# Patient Record
Sex: FEMALE | Race: WHITE | Employment: FULL TIME | URBAN - NONMETROPOLITAN AREA
[De-identification: names, ages, dates, MRNs, and addresses within clinical notes are randomized per-mention and may not be internally consistent; named-entity substitution may affect disease eponyms.]

---

## 2017-03-01 DIAGNOSIS — N92.6 IRREGULAR PERIODS: Primary | ICD-10-CM

## 2017-03-01 DIAGNOSIS — Z31.9 INFERTILITY MANAGEMENT: ICD-10-CM

## 2017-03-07 ENCOUNTER — TELEPHONE (OUTPATIENT)
Dept: OBGYN | Age: 32
End: 2017-03-07

## 2017-03-08 ENCOUNTER — TELEPHONE (OUTPATIENT)
Dept: OBGYN | Age: 32
End: 2017-03-08

## 2017-03-08 ENCOUNTER — HOSPITAL ENCOUNTER (OUTPATIENT)
Dept: GENERAL RADIOLOGY | Age: 32
Discharge: HOME | End: 2017-03-08
Payer: COMMERCIAL

## 2017-03-08 DIAGNOSIS — N92.6 IRREGULAR PERIODS: ICD-10-CM

## 2017-03-08 PROCEDURE — 74740 X-RAY FEMALE GENITAL TRACT: CPT

## 2017-03-17 ENCOUNTER — OFFICE VISIT (OUTPATIENT)
Dept: OBGYN | Age: 32
End: 2017-03-17
Payer: COMMERCIAL

## 2017-03-17 VITALS
DIASTOLIC BLOOD PRESSURE: 60 MMHG | SYSTOLIC BLOOD PRESSURE: 120 MMHG | HEIGHT: 64 IN | BODY MASS INDEX: 23.05 KG/M2 | WEIGHT: 135 LBS

## 2017-03-17 DIAGNOSIS — N92.6 IRREGULAR PERIODS: Primary | ICD-10-CM

## 2017-03-17 LAB
CONTROL: NORMAL
PREGNANCY TEST URINE, POC: NORMAL

## 2017-03-17 PROCEDURE — 99213 OFFICE O/P EST LOW 20 MIN: CPT | Performed by: OBSTETRICS & GYNECOLOGY

## 2017-03-17 PROCEDURE — 81025 URINE PREGNANCY TEST: CPT | Performed by: OBSTETRICS & GYNECOLOGY

## 2017-03-20 ENCOUNTER — TELEPHONE (OUTPATIENT)
Dept: OBGYN | Age: 32
End: 2017-03-20

## 2017-03-21 ENCOUNTER — TELEPHONE (OUTPATIENT)
Dept: OBGYN | Age: 32
End: 2017-03-21

## 2017-03-21 DIAGNOSIS — R79.89 ELEVATED PROLACTIN LEVEL: Primary | ICD-10-CM

## 2017-03-22 ENCOUNTER — HOSPITAL ENCOUNTER (OUTPATIENT)
Dept: ULTRASOUND IMAGING | Age: 32
Discharge: HOME | End: 2017-03-22
Payer: COMMERCIAL

## 2017-03-22 ENCOUNTER — TELEPHONE (OUTPATIENT)
Dept: OBGYN | Age: 32
End: 2017-03-22

## 2017-03-22 DIAGNOSIS — N92.6 IRREGULAR PERIODS: ICD-10-CM

## 2017-03-22 PROCEDURE — 76830 TRANSVAGINAL US NON-OB: CPT

## 2017-03-22 RX ORDER — METFORMIN HYDROCHLORIDE 500 MG/1
TABLET, EXTENDED RELEASE ORAL
Qty: 180 TABLET | Refills: 11 | OUTPATIENT
Start: 2017-03-22 | End: 2017-08-05 | Stop reason: DRUGHIGH

## 2017-05-23 LAB — PROLACTIN: 7.3

## 2017-07-31 ENCOUNTER — OFFICE VISIT (OUTPATIENT)
Dept: URGENT CARE | Age: 32
End: 2017-07-31
Payer: COMMERCIAL

## 2017-07-31 VITALS
HEART RATE: 118 BPM | WEIGHT: 139 LBS | SYSTOLIC BLOOD PRESSURE: 127 MMHG | DIASTOLIC BLOOD PRESSURE: 74 MMHG | OXYGEN SATURATION: 97 % | RESPIRATION RATE: 18 BRPM | BODY MASS INDEX: 23.73 KG/M2 | HEIGHT: 64 IN | TEMPERATURE: 98 F

## 2017-07-31 DIAGNOSIS — J06.9 VIRAL URI: Primary | ICD-10-CM

## 2017-07-31 DIAGNOSIS — R09.89 CHEST CONGESTION: ICD-10-CM

## 2017-07-31 DIAGNOSIS — R11.15 NON-INTRACTABLE CYCLICAL VOMITING WITH NAUSEA: ICD-10-CM

## 2017-07-31 DIAGNOSIS — R05.9 COUGH: ICD-10-CM

## 2017-07-31 LAB — S PYO AG THROAT QL: NORMAL

## 2017-07-31 PROCEDURE — 99212 OFFICE O/P EST SF 10 MIN: CPT | Performed by: NURSE PRACTITIONER

## 2017-07-31 PROCEDURE — 87880 STREP A ASSAY W/OPTIC: CPT | Performed by: NURSE PRACTITIONER

## 2017-07-31 RX ORDER — GUAIFENESIN AND DEXTROMETHORPHAN HYDROBROMIDE 1200; 60 MG/1; MG/1
1 TABLET, EXTENDED RELEASE ORAL EVERY 12 HOURS
Qty: 28 TABLET | Refills: 0 | Status: SHIPPED | OUTPATIENT
Start: 2017-07-31 | End: 2018-02-23

## 2017-07-31 RX ORDER — CABERGOLINE 0.5 MG/1
0.25 TABLET ORAL
COMMUNITY
End: 2018-05-14

## 2017-07-31 RX ORDER — ONDANSETRON 4 MG/1
4 TABLET, ORALLY DISINTEGRATING ORAL EVERY 8 HOURS PRN
Qty: 15 TABLET | Refills: 0 | Status: SHIPPED | OUTPATIENT
Start: 2017-07-31 | End: 2017-08-05 | Stop reason: ALTCHOICE

## 2017-07-31 ASSESSMENT — ENCOUNTER SYMPTOMS
COUGH: 1
SINUS PRESSURE: 0
VOMITING: 1
NAUSEA: 1
SORE THROAT: 0

## 2017-08-05 ENCOUNTER — OFFICE VISIT (OUTPATIENT)
Dept: URGENT CARE | Age: 32
End: 2017-08-05
Payer: COMMERCIAL

## 2017-08-05 VITALS
HEIGHT: 64 IN | TEMPERATURE: 98.7 F | BODY MASS INDEX: 23.56 KG/M2 | RESPIRATION RATE: 20 BRPM | DIASTOLIC BLOOD PRESSURE: 79 MMHG | SYSTOLIC BLOOD PRESSURE: 114 MMHG | WEIGHT: 138 LBS | OXYGEN SATURATION: 96 % | HEART RATE: 114 BPM

## 2017-08-05 DIAGNOSIS — J01.90 ACUTE SINUSITIS, RECURRENCE NOT SPECIFIED, UNSPECIFIED LOCATION: ICD-10-CM

## 2017-08-05 DIAGNOSIS — R05.9 COUGH: ICD-10-CM

## 2017-08-05 DIAGNOSIS — J40 BRONCHITIS: Primary | ICD-10-CM

## 2017-08-05 PROCEDURE — 99213 OFFICE O/P EST LOW 20 MIN: CPT | Performed by: NURSE PRACTITIONER

## 2017-08-05 RX ORDER — AMOXICILLIN AND CLAVULANATE POTASSIUM 875; 125 MG/1; MG/1
1 TABLET, FILM COATED ORAL 2 TIMES DAILY
Qty: 20 TABLET | Refills: 0 | Status: SHIPPED | OUTPATIENT
Start: 2017-08-05 | End: 2017-08-15

## 2017-08-05 ASSESSMENT — ENCOUNTER SYMPTOMS
EYE DISCHARGE: 0
DIARRHEA: 0
WHEEZING: 0
CONSTIPATION: 0
NAUSEA: 0
PHOTOPHOBIA: 0
ABDOMINAL PAIN: 0
COUGH: 1
VOMITING: 0
EYE PAIN: 0
SORE THROAT: 0
SHORTNESS OF BREATH: 0
BLOOD IN STOOL: 0

## 2017-08-16 LAB — PROLACTIN: 2 NG/ML (ref 2.8–26)

## 2017-10-09 DIAGNOSIS — R10.9 ABDOMINAL PAIN, UNSPECIFIED LOCATION: ICD-10-CM

## 2017-10-09 DIAGNOSIS — R10.13 DYSPEPSIA: ICD-10-CM

## 2017-10-09 DIAGNOSIS — R74.8 ELEVATED LIVER ENZYMES: ICD-10-CM

## 2017-10-09 DIAGNOSIS — Z88.9 HISTORY OF ALLERGY: ICD-10-CM

## 2017-10-09 DIAGNOSIS — R53.83 FATIGUE, UNSPECIFIED TYPE: ICD-10-CM

## 2017-10-09 DIAGNOSIS — K21.9 GASTROESOPHAGEAL REFLUX DISEASE, ESOPHAGITIS PRESENCE NOT SPECIFIED: ICD-10-CM

## 2017-10-09 DIAGNOSIS — R94.8 ABNORMAL BILIARY HIDA SCAN: ICD-10-CM

## 2017-10-10 ENCOUNTER — OFFICE VISIT (OUTPATIENT)
Dept: INTERNAL MEDICINE | Age: 32
End: 2017-10-10
Payer: COMMERCIAL

## 2017-10-10 VITALS
HEART RATE: 94 BPM | SYSTOLIC BLOOD PRESSURE: 122 MMHG | TEMPERATURE: 96.5 F | DIASTOLIC BLOOD PRESSURE: 80 MMHG | WEIGHT: 139 LBS | HEIGHT: 64 IN | BODY MASS INDEX: 23.73 KG/M2 | OXYGEN SATURATION: 93 %

## 2017-10-10 DIAGNOSIS — R22.1 NODULE OF NECK: Primary | ICD-10-CM

## 2017-10-10 PROBLEM — R53.83 FATIGUE: Status: ACTIVE | Noted: 2017-10-10

## 2017-10-10 PROBLEM — K21.9 ESOPHAGEAL REFLUX: Status: ACTIVE | Noted: 2017-10-10

## 2017-10-10 PROBLEM — R74.8 ELEVATED LIVER ENZYMES: Status: ACTIVE | Noted: 2017-10-10

## 2017-10-10 PROBLEM — R10.13 DYSPEPSIA: Status: ACTIVE | Noted: 2017-10-10

## 2017-10-10 PROBLEM — Z88.9 HISTORY OF ALLERGY: Status: ACTIVE | Noted: 2017-10-10

## 2017-10-10 PROCEDURE — 99213 OFFICE O/P EST LOW 20 MIN: CPT | Performed by: PHYSICIAN ASSISTANT

## 2017-10-10 RX ORDER — METFORMIN HYDROCHLORIDE 500 MG/1
500 TABLET, EXTENDED RELEASE ORAL 2 TIMES DAILY
Refills: 11 | COMMUNITY
Start: 2017-09-18 | End: 2018-03-11 | Stop reason: SDUPTHER

## 2017-10-10 ASSESSMENT — ENCOUNTER SYMPTOMS
RHINORRHEA: 0
DIARRHEA: 0
EYE REDNESS: 0
SINUS PRESSURE: 0
SORE THROAT: 0
CHEST TIGHTNESS: 0
CONSTIPATION: 0
SHORTNESS OF BREATH: 0
COLOR CHANGE: 0
VOMITING: 0
EYE PAIN: 0
PHOTOPHOBIA: 0
ABDOMINAL PAIN: 0
WHEEZING: 0
NAUSEA: 0
COUGH: 0

## 2017-10-10 NOTE — PROGRESS NOTES
[facility]  [address] [address]  Dept: [phone]  Dept Fax: [phone]: [phone]      [facility] INTERNAL MEDICINE  OFFICE NOTE      Chief Complaint   Patient presents with    Other     knot in neck and shoulder       Samara Cabrera is a 29y.o. year old female who is seen for evaluation of Nodules in the neck. Patient states about a month ago or so she noticed a nodule on the right side of her neck she didn't think much about it and then recently she noticed another one farther down in the neck. Patient denies any fever, change in weight or diet, night sweats, nodules in the breast or any abnormalities. Patient states that some of her aunts have been diagnosed with breast cancer and  from it. Patient states her grandfather had esophageal cancer. Patient states concerned about these nodules. Patient denies any pain around the nodules. Patient denies any family history of lymphoma. Active Ambulatory Problems     Diagnosis Date Noted    Biliary dyskinesia 10/13/2016    Abdominal pain 10/09/2017    Abnormal biliary HIDA scan 10/09/2017    Dyspepsia 10/10/2017    Elevated liver enzymes 10/10/2017    Esophageal reflux 10/10/2017    Fatigue 10/10/2017    History of allergy 10/10/2017     Resolved Ambulatory Problems     Diagnosis Date Noted    No Resolved Ambulatory Problems     Past Medical History:   Diagnosis Date    Esophageal reflux 10/10/2017    Gallbladder disease     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     Prolactin deficiency ([facility] [address]        Past Medical History:   Diagnosis Date    Esophageal reflux 10/10/2017    Gallbladder disease     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     Patient tested positive about two weeks ago from today, 16    Prolactin deficiency ([facility] [address]        Prior to Visit Medications    Medication Sig Taking?  Authorizing Provider   metFORMIN (GLUCOPHAGE-XR) 500 MG extended release tablet Take 500 mg by mouth 2 times daily Yes Historical Provider, MD   cabergoline (DOSTINEX) 0.5 MG tablet Take 0.25 mg by mouth Twice a Week Yes Historical Provider, MD   Prenatal MV-Min-Fe Fum-FA-DHA (PRENATAL 1 PO) Take by mouth Yes Historical Provider, MD   Dextromethorphan-guaiFENesin  MG TB12 Take 1 tablet by mouth every 12 hours Yes DIANA Gusman   Pseudoephedrine HCl (SUDAFED PO) Take by mouth  Historical Provider, MD       Past Surgical History:   Procedure Laterality Date    CHOLECYSTECTOMY, LAPAROSCOPIC N/A 10/13/2016    CHOLECYSTECTOM[facility]AR[facility]COPIC [facility]      TYMPANOSTOMY TUBE PLACEMENT         Family History   Problem Relation Age of Onset    Hypertension Mother     Diabetes Father     Stroke Maternal Grandmother     Heart Disease Maternal Grandfather        No Known Allergies    Social History     Social History    Marital status:      Spouse name: N/A    Number of children: N/A    Years of education: N/A     Occupational History    Not on file. Social History Main Topics    Smoking status: Never Smoker    Smokeless tobacco: Never Used      Comment: Work at [employer] Alcohol use No    Drug use: No    Sexual activity: Not on file     Other Topics Concern    Not on file     Social History Narrative    No narrative on file       Review of Systems  Review of Systems   Constitutional: Negative for activity change, appetite change, chills, fatigue and fever. HENT: Negative for congestion, ear pain, postnasal drip, rhinorrhea, sinus pressure, sneezing and sore throat. Eyes: Negative for photophobia, pain and redness. Respiratory: Negative for cough, chest tightness, shortness of breath and wheezing. Cardiovascular: Negative for chest pain, palpitations and leg swelling. Gastrointestinal: Negative for abdominal pain, constipation, diarrhea, nausea and vomiting.    Genitourinary: Negative for dysuria, frequency, hematuria and distress. She has no wheezes. She has no rales. She exhibits no tenderness. Abdominal: Soft. Bowel sounds are normal. There is no tenderness. There is no rebound and no guarding. Musculoskeletal: Normal range of motion. She exhibits no tenderness or deformity. Lymphadenopathy:     She has no cervical adenopathy. Neurological: She is alert. Skin: Skin is warm, dry and intact. No lesion and no rash noted. No erythema. Psychiatric: She has a normal mood and affect. Her behavior is normal. Judgment and thought content normal. Her mood appears not anxious. She does not exhibit a depressed mood. Nursing note and vitals reviewed. ASSESSMENT      ICD-10-CM ICD-9-CM    1. Nodule of neck R22.1 784.2 CT Soft Tissue Neck W WO Contrast      HCG Qualitative, Serum      Comprehensive Metabolic Panel      CBC Auto Differential       PLAN  Discussed with patient that we would do a CT with and without contrast of the neck. Patient will also need to have labs done prior to CT scan to verify that she is not pregnant and make sure her kidney functions are okay. We'll call patient with results. Patient follow-up as needed for increased pain, fever, weight changes or as needed if not improving. Orders Placed This Encounter   Procedures    CT Soft Tissue Neck W WO Contrast    HCG Qualitative, Serum    Comprehensive Metabolic Panel    CBC Auto Differential        Return in about 2 weeks (around 10/24/2017) for Nodules within the neck. All questions answered. Patient voices understanding and agrees to plans along with risks and benefits of plan. Patient is instructed to continue prior medications, diet, and exercise plans as instructed. Patient agrees to follow up as instructions, sooner if needed, or to go to ER if condition becomes emergent.                   Additional Instructions: As always, patient is advised to bring in medication bottles in order to correctly reconcile with our current

## 2017-10-12 ENCOUNTER — TELEPHONE (OUTPATIENT)
Dept: INTERNAL MEDICINE | Age: 32
End: 2017-10-12

## 2017-10-12 DIAGNOSIS — R22.1 NODULE OF NECK: ICD-10-CM

## 2017-10-12 LAB
ALBUMIN SERPL-MCNC: 3.9 G/DL (ref 3.5–5.2)
ALP BLD-CCNC: 58 U/L (ref 35–104)
ALT SERPL-CCNC: 12 U/L (ref 5–33)
ANION GAP SERPL CALCULATED.3IONS-SCNC: 12 MMOL/L (ref 7–19)
AST SERPL-CCNC: 18 U/L (ref 5–32)
BASOPHILS ABSOLUTE: 0.1 K/UL (ref 0–0.2)
BASOPHILS RELATIVE PERCENT: 1 % (ref 0–1)
BILIRUB SERPL-MCNC: 0.7 MG/DL (ref 0.2–1.2)
BUN BLDV-MCNC: 14 MG/DL (ref 6–20)
CALCIUM SERPL-MCNC: 9 MG/DL (ref 8.6–10)
CHLORIDE BLD-SCNC: 100 MMOL/L (ref 98–111)
CO2: 27 MMOL/L (ref 22–29)
CREAT SERPL-MCNC: 0.6 MG/DL (ref 0.5–0.9)
EOSINOPHILS ABSOLUTE: 0.3 K/UL (ref 0–0.6)
EOSINOPHILS RELATIVE PERCENT: 5.1 % (ref 0–5)
GFR NON-AFRICAN AMERICAN: >60
GLUCOSE BLD-MCNC: 97 MG/DL (ref 74–109)
HCG QUALITATIVE: NEGATIVE
HCT VFR BLD CALC: 41.5 % (ref 37–47)
HEMOGLOBIN: 13.7 G/DL (ref 12–16)
LYMPHOCYTES ABSOLUTE: 1.8 K/UL (ref 1.1–4.5)
LYMPHOCYTES RELATIVE PERCENT: 29.8 % (ref 20–40)
MCH RBC QN AUTO: 27.9 PG (ref 27–31)
MCHC RBC AUTO-ENTMCNC: 33 G/DL (ref 33–37)
MCV RBC AUTO: 84.5 FL (ref 81–99)
MONOCYTES ABSOLUTE: 0.4 K/UL (ref 0–0.9)
MONOCYTES RELATIVE PERCENT: 6.5 % (ref 0–10)
NEUTROPHILS ABSOLUTE: 3.5 K/UL (ref 1.5–7.5)
NEUTROPHILS RELATIVE PERCENT: 57.6 % (ref 50–65)
PDW BLD-RTO: 13.2 % (ref 11.5–14.5)
PLATELET # BLD: 282 K/UL (ref 130–400)
PMV BLD AUTO: 8.9 FL (ref 9.4–12.3)
POTASSIUM SERPL-SCNC: 4 MMOL/L (ref 3.5–5)
RBC # BLD: 4.91 M/UL (ref 4.2–5.4)
SODIUM BLD-SCNC: 139 MMOL/L (ref 136–145)
TOTAL PROTEIN: 7.1 G/DL (ref 6.6–8.7)
WBC # BLD: 6 K/UL (ref 4.8–10.8)

## 2017-10-13 ENCOUNTER — TELEPHONE (OUTPATIENT)
Dept: INTERNAL MEDICINE | Age: 32
End: 2017-10-13

## 2017-10-17 DIAGNOSIS — R22.1 MASS IN NECK: Primary | ICD-10-CM

## 2017-10-18 ENCOUNTER — HOSPITAL ENCOUNTER (OUTPATIENT)
Dept: ULTRASOUND IMAGING | Age: 32
Discharge: HOME | End: 2017-10-18
Payer: COMMERCIAL

## 2017-10-18 DIAGNOSIS — R22.1 MASS IN NECK: ICD-10-CM

## 2017-10-18 PROCEDURE — 76536 US EXAM OF HEAD AND NECK: CPT

## 2017-10-18 NOTE — TELEPHONE ENCOUNTER
.The patient and we will try to do an ultrasound first to see if we can differentiate what these nodules are. Patient agreed. If ultrasound does not give a definitive diagnosis can consider CT at that time. Or send patient over to surgery.

## 2017-11-20 ENCOUNTER — TELEPHONE (OUTPATIENT)
Dept: OBGYN | Age: 32
End: 2017-11-20

## 2017-11-20 DIAGNOSIS — E22.1 HYPERPROLACTINEMIA (HCC): Primary | ICD-10-CM

## 2017-11-21 DIAGNOSIS — Z00.00 ROUTINE GENERAL MEDICAL EXAMINATION AT A HEALTH CARE FACILITY: ICD-10-CM

## 2017-11-21 LAB
ALBUMIN SERPL-MCNC: 4.3 G/DL (ref 3.5–5.2)
ALP BLD-CCNC: 74 U/L (ref 35–104)
ALT SERPL-CCNC: 15 U/L (ref 5–33)
ANION GAP SERPL CALCULATED.3IONS-SCNC: 15 MMOL/L (ref 7–19)
AST SERPL-CCNC: 16 U/L (ref 5–32)
BASOPHILS ABSOLUTE: 0.1 K/UL (ref 0–0.2)
BASOPHILS RELATIVE PERCENT: 0.4 % (ref 0–1)
BILIRUB SERPL-MCNC: 0.4 MG/DL (ref 0.2–1.2)
BILIRUBIN URINE: ABNORMAL
BLOOD, URINE: NEGATIVE
BUN BLDV-MCNC: 11 MG/DL (ref 6–20)
CALCIUM SERPL-MCNC: 9.4 MG/DL (ref 8.6–10)
CHLORIDE BLD-SCNC: 99 MMOL/L (ref 98–111)
CHOLESTEROL, TOTAL: 154 MG/DL (ref 160–199)
CLARITY: CLEAR
CO2: 28 MMOL/L (ref 22–29)
COLOR: YELLOW
CREAT SERPL-MCNC: 0.6 MG/DL (ref 0.5–0.9)
EOSINOPHILS ABSOLUTE: 0.3 K/UL (ref 0–0.6)
EOSINOPHILS RELATIVE PERCENT: 2.8 % (ref 0–5)
GFR NON-AFRICAN AMERICAN: >60
GLUCOSE BLD-MCNC: 91 MG/DL (ref 74–109)
GLUCOSE URINE: NEGATIVE MG/DL
HCT VFR BLD CALC: 43.3 % (ref 37–47)
HDLC SERPL-MCNC: 42 MG/DL (ref 65–121)
HEMOGLOBIN: 14 G/DL (ref 12–16)
KETONES, URINE: NEGATIVE MG/DL
LDL CHOLESTEROL CALCULATED: 99 MG/DL
LEUKOCYTE ESTERASE, URINE: ABNORMAL
LYMPHOCYTES ABSOLUTE: 2.1 K/UL (ref 1.1–4.5)
LYMPHOCYTES RELATIVE PERCENT: 17.7 % (ref 20–40)
MCH RBC QN AUTO: 26.9 PG (ref 27–31)
MCHC RBC AUTO-ENTMCNC: 32.3 G/DL (ref 33–37)
MCV RBC AUTO: 83.3 FL (ref 81–99)
MONOCYTES ABSOLUTE: 0.6 K/UL (ref 0–0.9)
MONOCYTES RELATIVE PERCENT: 5.4 % (ref 0–10)
NEUTROPHILS ABSOLUTE: 8.5 K/UL (ref 1.5–7.5)
NEUTROPHILS RELATIVE PERCENT: 73.4 % (ref 50–65)
NITRITE, URINE: NEGATIVE
PDW BLD-RTO: 13.2 % (ref 11.5–14.5)
PH UA: 5.5
PLATELET # BLD: 344 K/UL (ref 130–400)
PMV BLD AUTO: 8.8 FL (ref 9.4–12.3)
POTASSIUM SERPL-SCNC: 3.9 MMOL/L (ref 3.5–5)
PROTEIN UA: ABNORMAL MG/DL
RBC # BLD: 5.2 M/UL (ref 4.2–5.4)
SODIUM BLD-SCNC: 142 MMOL/L (ref 136–145)
SPECIFIC GRAVITY UA: >=1.03
TOTAL PROTEIN: 7.7 G/DL (ref 6.6–8.7)
TRIGL SERPL-MCNC: 65 MG/DL (ref 0–149)
TSH SERPL DL<=0.05 MIU/L-ACNC: 1.66 UIU/ML (ref 0.27–4.2)
UROBILINOGEN, URINE: 0.2 E.U./DL
VITAMIN D 25-HYDROXY: 34.6 NG/ML
WBC # BLD: 11.6 K/UL (ref 4.8–10.8)

## 2017-11-21 NOTE — TELEPHONE ENCOUNTER
Informed pt that Dr. Tabatha Gooden wants to see what Prolactin level is before starting medication for periods. Will need to refer pt to endocrinologist. Pt aware.

## 2017-11-23 LAB — URINE CULTURE, ROUTINE: NORMAL

## 2017-11-27 ENCOUNTER — OFFICE VISIT (OUTPATIENT)
Dept: INTERNAL MEDICINE | Age: 32
End: 2017-11-27
Payer: COMMERCIAL

## 2017-11-27 VITALS
DIASTOLIC BLOOD PRESSURE: 70 MMHG | HEIGHT: 64 IN | BODY MASS INDEX: 23.22 KG/M2 | SYSTOLIC BLOOD PRESSURE: 124 MMHG | HEART RATE: 109 BPM | WEIGHT: 136 LBS | OXYGEN SATURATION: 96 %

## 2017-11-27 DIAGNOSIS — Z00.00 ROUTINE CHECK-UP: Primary | ICD-10-CM

## 2017-11-27 DIAGNOSIS — J01.90 ACUTE BACTERIAL SINUSITIS: ICD-10-CM

## 2017-11-27 DIAGNOSIS — E28.2 POLYCYSTIC OVARIAN SYNDROME: ICD-10-CM

## 2017-11-27 DIAGNOSIS — E22.1 HYPERPROLACTINEMIA (HCC): ICD-10-CM

## 2017-11-27 DIAGNOSIS — B96.89 ACUTE BACTERIAL SINUSITIS: ICD-10-CM

## 2017-11-27 PROCEDURE — 99214 OFFICE O/P EST MOD 30 MIN: CPT | Performed by: INTERNAL MEDICINE

## 2017-11-27 RX ORDER — AMOXICILLIN AND CLAVULANATE POTASSIUM 875; 125 MG/1; MG/1
1 TABLET, FILM COATED ORAL 2 TIMES DAILY
Qty: 20 TABLET | Refills: 0 | Status: SHIPPED | OUTPATIENT
Start: 2017-11-27 | End: 2017-12-07

## 2017-11-27 ASSESSMENT — ENCOUNTER SYMPTOMS
COUGH: 0
COLOR CHANGE: 0
SORE THROAT: 0
EYE DISCHARGE: 0
PHOTOPHOBIA: 0
EYE REDNESS: 0
RHINORRHEA: 1
FACIAL SWELLING: 0
DIARRHEA: 0
SINUS PAIN: 1
SINUS PRESSURE: 1
EYE ITCHING: 0
BLOOD IN STOOL: 0
CONSTIPATION: 0
ABDOMINAL DISTENTION: 0
ABDOMINAL PAIN: 0
WHEEZING: 0
EYE PAIN: 0
CHEST TIGHTNESS: 0
VOMITING: 0
NAUSEA: 0
BACK PAIN: 0
SHORTNESS OF BREATH: 0

## 2017-11-27 ASSESSMENT — PATIENT HEALTH QUESTIONNAIRE - PHQ9
1. LITTLE INTEREST OR PLEASURE IN DOING THINGS: 0
SUM OF ALL RESPONSES TO PHQ9 QUESTIONS 1 & 2: 0
SUM OF ALL RESPONSES TO PHQ QUESTIONS 1-9: 0
2. FEELING DOWN, DEPRESSED OR HOPELESS: 0

## 2017-11-27 NOTE — PATIENT INSTRUCTIONS
potassium is a form of clavulanic acid, which is similar to penicillin. Clavulanate potassium fights bacteria that is often resistant to penicillins and other antibiotics. The combination of amoxicillin and clavulanate potassium is used to treat many different infections caused by bacteria, such as sinusitis, pneumonia, ear infections, bronchitis, urinary tract infections, and infections of the skin. Amoxicillin and clavulanate potassium may also be used for purposes not listed in this medication guide. What should I discuss with my healthcare provider before taking amoxicillin and clavulanate potassium? Do not use this medication if you are allergic to amoxicillin or clavulanate potassium, or if you have ever had liver problems caused by this medication. Do not use if you are allergic to any other penicillin antibiotic, such as amoxicillin (Amoxil, Augmentin, Dispermox, Moxatag), ampicillin (Principen, Unasyn), dicloxacillin (Dycill, Dynapen), oxacillin (Bactocill), or penicillin (Bicillin L-A, PC Pen VK, Pfizerpen)), and others. To make sure you can safely take this medicine, tell your doctor if you have any of these other conditions:  · liver disease (or a history of hepatitis or jaundice);  · kidney disease;  · mononucleosis; or  · if you are allergic to a cephalosporin antibiotic, such as cefdinir (Omnicef), cefprozil (Cefzil), cefuroxime (Ceftin), cephalexin (Keflex), and others. FDA pregnancy category B. This medication is not expected to be harmful to an unborn baby. Tell your doctor if you are pregnant or plan to become pregnant during treatment. Amoxicillin and clavulanate potassium can make birth control pills less effective. Ask your doctor about using a non-hormone method of birth control (such as a condom, diaphragm, spermicide) to prevent pregnancy while taking amoxicillin and clavulanate potassium. Amoxicillin and clavulanate potassium can pass into breast milk and may harm a nursing baby. Do not use this medication without telling your doctor if you are breast-feeding a baby. The liquid and chewable tablet forms of this medication may contain phenylalanine. Talk to your doctor before using these forms of amoxicillin and clavulanate potassium if you have phenylketonuria (PKU). How should I take amoxicillin and clavulanate potassium? Take exactly as prescribed by your doctor. Do not take in larger or smaller amounts or for longer than recommended. Follow the directions on your prescription label. If you switch from one tablet form to another (regular, chewable, or extended-release tablet), take only the new tablet form and strength prescribed for you. The strength of clavulanate potassium is not the same among the different tablet forms, even though the amount of amoxicillin may be the same as in the tablet you were using before. This medicine may not be as effective or could be harmful if you do not use the exact tablet form your doctor has prescribed. Take this medicine with a full glass of water. Take the medicine at the start of a meal to reduce stomach upset. Take the medicine at the same time each day. The Augmentin tablet should be swallowed whole. The Augmentin Chewable tablet must be chewed before swallowing. Do not swallow a chewable tablet whole. Do not crush or chew the Augmentin XR (extended-release) tablet. Swallow the pill whole, or break the pill in half and take both halves one at a time. If you have trouble swallowing a whole or half pill, talk with your doctor about using another form of amoxicillin and clavulanate potassium. Shake the liquid form of this medicine well just before you measure a dose. To be sure you get the correct dose, measure the liquid with a marked measuring spoon or medicine cup, not with a regular table spoon. If you do not have a dose-measuring device, ask your pharmacist for one. Take this medication for the full prescribed length of time.  Your symptoms may improve before the infection is completely cleared. Skipping doses may also increase your risk of further infection that is resistant to antibiotics. Amoxicillin and clavulanate potassium will not treat a viral infection such as the common cold or flu. This medication can cause false results with certain lab tests for glucose (sugar) in the urine. Tell any doctor who treats you that you are using amoxicillin and clavulanate potassium. Store the tablets at room temperature away from moisture and heat. Store the liquid  in the refrigerator. Throw away any unused liquid after 10 days. What happens if I miss a dose? Take the missed dose as soon as you remember. Skip the missed dose if it is almost time for your next scheduled dose. Do not take extra medicine to make up the missed dose. What happens if I overdose? Seek emergency medical attention or call the Poison Help line at 1-776.842.8728. Overdose can cause nausea, vomiting, stomach pain, diarrhea, skin rash, drowsiness, and hyperactivity. What should I avoid while taking amoxicillin and clavulanate potassium? Antibiotic medicines can cause diarrhea, which may be a sign of a new infection. If you have diarrhea that is watery or has blood in it, stop taking this medication and call your doctor. Do not use any medicine to stop the diarrhea unless your doctor has told you to. What are the possible side effects of amoxicillin and clavulanate potassium? Get emergency medical help if you have any of these signs of an allergic reaction: hives; difficulty breathing; swelling of your face, lips, tongue, or throat.   Stop using this medicine and call your doctor at once if you have a serious side effect such as:  · diarrhea that is watery or has blood in it;  · pale or yellowed skin, dark colored urine, fever, confusion or weakness;  · easy bruising or bleeding;  · skin rash, bruising, severe tingling, numbness, pain, muscle weakness;  · agitation,

## 2017-11-27 NOTE — PROGRESS NOTES
Visit Information    Have you changed or started any medications since your last visit including any over-the-counter medicines, vitamins, or herbal medicines? no   Are you having any side effects from any of your medications? -  no  Have you stopped taking any of your medications? Is so, why? -  no    Have you seen any other physician or provider since your last visit? No  Have you had any other diagnostic tests since your last visit? No  Have you been seen in the emergency room and/or had an admission to a hospital since we last saw you? No  Have you had your routine dental cleaning in the past 6 months? yes - 08/17    Have you activated your [employer] account? If not, what are your barriers?  No: decline     Patient Care Team:  Mattie Colvin MD as PCP - General (Family Medicine)    Medical History Review  Past Medical, Family, and Social History reviewed and does not contribute to the patient presenting condition    Health Maintenance   Topic Date Due    HIV screen  09/14/2002    DTaP/Tdap/Td vaccine (1 - Tdap) 09/14/2006    Cervical cancer screen  09/14/2008    Flu vaccine (1) 09/01/2017

## 2017-11-27 NOTE — PROGRESS NOTES
Chief Complaint   Patient presents with    Annual Exam     nasal congestion has been on sudafed been two weeks, saw lino a month ago for a lump on right neck       HPI: Binta Elizabeth is a 29 y.o. female is here for Her annual physical exam.    Her functional status is good. She denies a history of falls. She has no concerns regarding safety, hearing, or cognition. She sees Dr. Etta Middleton for her annual gynecological screening. She's been dealing with fertility and heavy menstrual periods. She is to see Dr. Kaitlynn Galvan for history of hyperprolactinemia. She is October Colene. Prolactin level is currently pending at this time. She does have a reactive lymph node on the left side of her neck. She's been reported nasal congestion for last 2 weeks. She also has sinus pressure and pain. She denies a complaints of ear pain or sore throat. Her cholesterol is 151. She also has a history of polycystic ovarian syndrome. She is somewhat depressed over the inability to conceive.     Routine screening is as follows:  Vision exam yearly  Pap smear up-to-date  Flu vaccine yearly    Past Medical History:   Diagnosis Date    Esophageal reflux 10/10/2017    Gallbladder disease     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     Patient tested positive about two weeks ago from today, 09/22/16    Prolactin deficiency [facility])       Past Surgical History:   Procedure Laterality Date    CHOLECYSTECTOMY, LAPAROSCOPIC N/A 10/13/2016    CHOLECYSTECTOMY LAPAROSCOPIC performed by Hugh Bee MD [facility] TYMPANOSTOMY TUBE PLACEMENT        Social History     Social History    Marital status:      Spouse name: N/A    Number of children: N/A    Years of education: N/A     Social History Main Topics    Smoking status: Never Smoker    Smokeless tobacco: Never Used      Comment: Work at [employer] Alcohol use No    Drug use: No    Sexual activity: Not Asked     Other Topics Concern    None     Social History Narrative    None      Family History   Problem Relation Age of Onset    Hypertension Mother     Diabetes Father     Stroke Maternal Grandmother     Heart Disease Maternal Grandfather         Current Outpatient Prescriptions   Medication Sig Dispense Refill    amoxicillin-clavulanate (AUGMENTIN) 875-125 MG per tablet Take 1 tablet by mouth 2 times daily for 10 days 20 tablet 0    metFORMIN (GLUCOPHAGE-XR) 500 MG extended release tablet Take 500 mg by mouth 2 times daily  11    Pseudoephedrine HCl (SUDAFED PO) Take by mouth      cabergoline (DOSTINEX) 0.5 MG tablet Take 0.25 mg by mouth Twice a Week      Prenatal MV-Min-Fe Fum-FA-DHA (PRENATAL 1 PO) Take by mouth      Dextromethorphan-guaiFENesin  MG TB12 Take 1 tablet by mouth every 12 hours 28 tablet 0     No current facility-administered medications for this visit. Patient Active Problem List   Diagnosis    Biliary dyskinesia    Abdominal pain    Abnormal biliary HIDA scan    Dyspepsia    Elevated liver enzymes    Esophageal reflux    Fatigue    History of allergy        Review of Systems   Constitutional: Negative for activity change, appetite change, chills, diaphoresis, fatigue, fever and unexpected weight change. HENT: Positive for congestion, postnasal drip, rhinorrhea, sinus pain and sinus pressure. Negative for ear pain, facial swelling, hearing loss, mouth sores, sore throat and tinnitus. Eyes: Negative for photophobia, pain, discharge, redness, itching and visual disturbance. Respiratory: Negative for cough, chest tightness, shortness of breath and wheezing. Cardiovascular: Negative for chest pain, palpitations and leg swelling. Gastrointestinal: Negative for abdominal distention, abdominal pain, blood in stool, constipation, diarrhea, nausea and vomiting. Endocrine: Negative for cold intolerance, heat intolerance, polydipsia, polyphagia and polyuria.    Genitourinary: Negative for decreased urine volume, difficulty

## 2017-11-29 ENCOUNTER — TELEPHONE (OUTPATIENT)
Dept: OBGYN | Age: 32
End: 2017-11-29

## 2017-11-29 RX ORDER — MEDROXYPROGESTERONE ACETATE 10 MG/1
10 TABLET ORAL DAILY
Qty: 30 TABLET | Refills: 1 | Status: SHIPPED | OUTPATIENT
Start: 2017-11-29 | End: 2018-02-23 | Stop reason: SDUPTHER

## 2018-02-23 ENCOUNTER — OFFICE VISIT (OUTPATIENT)
Dept: OBGYN | Age: 33
End: 2018-02-23
Payer: COMMERCIAL

## 2018-02-23 VITALS
DIASTOLIC BLOOD PRESSURE: 73 MMHG | SYSTOLIC BLOOD PRESSURE: 109 MMHG | WEIGHT: 135 LBS | HEIGHT: 64 IN | BODY MASS INDEX: 23.05 KG/M2 | HEART RATE: 82 BPM | TEMPERATURE: 98 F

## 2018-02-23 DIAGNOSIS — Z31.9 INFERTILITY MANAGEMENT: Primary | ICD-10-CM

## 2018-02-23 PROCEDURE — G8427 DOCREV CUR MEDS BY ELIG CLIN: HCPCS | Performed by: OBSTETRICS & GYNECOLOGY

## 2018-02-23 PROCEDURE — 99213 OFFICE O/P EST LOW 20 MIN: CPT | Performed by: OBSTETRICS & GYNECOLOGY

## 2018-02-23 PROCEDURE — G8484 FLU IMMUNIZE NO ADMIN: HCPCS | Performed by: OBSTETRICS & GYNECOLOGY

## 2018-02-23 PROCEDURE — G8420 CALC BMI NORM PARAMETERS: HCPCS | Performed by: OBSTETRICS & GYNECOLOGY

## 2018-02-23 PROCEDURE — 1036F TOBACCO NON-USER: CPT | Performed by: OBSTETRICS & GYNECOLOGY

## 2018-02-23 RX ORDER — LETROZOLE 2.5 MG/1
2.5 TABLET, FILM COATED ORAL DAILY
Qty: 5 TABLET | Refills: 3 | Status: SHIPPED | OUTPATIENT
Start: 2018-02-23 | End: 2018-05-14

## 2018-02-23 RX ORDER — MEDROXYPROGESTERONE ACETATE 10 MG/1
10 TABLET ORAL DAILY
Qty: 30 TABLET | Refills: 1 | Status: SHIPPED | OUTPATIENT
Start: 2018-02-23 | End: 2018-05-14

## 2018-02-23 ASSESSMENT — ENCOUNTER SYMPTOMS
RESPIRATORY NEGATIVE: 1
GASTROINTESTINAL NEGATIVE: 1
EYES NEGATIVE: 1

## 2018-02-27 ENCOUNTER — TELEPHONE (OUTPATIENT)
Dept: OBGYN | Age: 33
End: 2018-02-27

## 2018-02-27 NOTE — TELEPHONE ENCOUNTER
Pt wants to know if she can take the HCG Liquid or if she has to take the injections to trigger ovulation? [address] [facility] has the liquid and [facility] does not.      She is just wanting clarification

## 2018-03-06 ENCOUNTER — TELEPHONE (OUTPATIENT)
Dept: OBGYN | Age: 33
End: 2018-03-06

## 2018-03-07 ENCOUNTER — TELEPHONE (OUTPATIENT)
Dept: OBGYN | Age: 33
End: 2018-03-07

## 2018-03-07 NOTE — TELEPHONE ENCOUNTER
Called to inform pt that Hanna reviewed TVUS since AW is off. Can trigger with HCG per SD. rx called into [facility] [place]. Called and informed pt . Pt states she called both [facility] and [facility] to check on pricing and [address] was cheaper. [address] and asked about Ovidrel rx price and they stated it was $187, however would have to order and will be tomorrow before pt could pick it up. Informed pt that will order through [facility] since they can have it today. Pt will have sister in law give injection. All questions answered.

## 2018-03-12 RX ORDER — METFORMIN HYDROCHLORIDE 500 MG/1
TABLET, EXTENDED RELEASE ORAL
Qty: 120 TABLET | Refills: 11 | Status: SHIPPED | OUTPATIENT
Start: 2018-03-12 | End: 2019-01-11 | Stop reason: ALTCHOICE

## 2018-03-21 ENCOUNTER — TELEPHONE (OUTPATIENT)
Dept: OBGYN | Age: 33
End: 2018-03-21

## 2018-03-21 DIAGNOSIS — Z31.9 INFERTILITY MANAGEMENT: Primary | ICD-10-CM

## 2018-04-02 ENCOUNTER — TELEPHONE (OUTPATIENT)
Dept: OBGYN | Age: 33
End: 2018-04-02

## 2018-04-19 ENCOUNTER — TELEPHONE (OUTPATIENT)
Dept: OBGYN | Age: 33
End: 2018-04-19

## 2018-04-19 ENCOUNTER — OFFICE VISIT (OUTPATIENT)
Dept: INTERNAL MEDICINE | Age: 33
End: 2018-04-19
Payer: COMMERCIAL

## 2018-04-19 VITALS
TEMPERATURE: 98.4 F | WEIGHT: 137 LBS | SYSTOLIC BLOOD PRESSURE: 118 MMHG | DIASTOLIC BLOOD PRESSURE: 78 MMHG | BODY MASS INDEX: 23.39 KG/M2 | HEIGHT: 64 IN | HEART RATE: 111 BPM | OXYGEN SATURATION: 98 %

## 2018-04-19 DIAGNOSIS — N91.2 AMENORRHEA: ICD-10-CM

## 2018-04-19 DIAGNOSIS — Z32.01 POSITIVE PREGNANCY TEST: ICD-10-CM

## 2018-04-19 DIAGNOSIS — N91.2 AMENORRHEA: Primary | ICD-10-CM

## 2018-04-19 LAB
CONTROL: NORMAL
HCG QUALITATIVE: POSITIVE
PREGNANCY TEST URINE, POC: NORMAL

## 2018-04-19 PROCEDURE — 81025 URINE PREGNANCY TEST: CPT | Performed by: NURSE PRACTITIONER

## 2018-04-19 PROCEDURE — G8427 DOCREV CUR MEDS BY ELIG CLIN: HCPCS | Performed by: NURSE PRACTITIONER

## 2018-04-19 PROCEDURE — 1036F TOBACCO NON-USER: CPT | Performed by: NURSE PRACTITIONER

## 2018-04-19 PROCEDURE — 99213 OFFICE O/P EST LOW 20 MIN: CPT | Performed by: NURSE PRACTITIONER

## 2018-04-19 PROCEDURE — G8420 CALC BMI NORM PARAMETERS: HCPCS | Performed by: NURSE PRACTITIONER

## 2018-04-19 ASSESSMENT — ENCOUNTER SYMPTOMS
COUGH: 0
SHORTNESS OF BREATH: 0
PHOTOPHOBIA: 0
COLOR CHANGE: 0
BACK PAIN: 0
VOMITING: 0
NAUSEA: 0
VOICE CHANGE: 0
RHINORRHEA: 0

## 2018-05-14 ENCOUNTER — INITIAL PRENATAL (OUTPATIENT)
Dept: OBGYN | Age: 33
End: 2018-05-14

## 2018-05-14 VITALS — SYSTOLIC BLOOD PRESSURE: 112 MMHG | DIASTOLIC BLOOD PRESSURE: 67 MMHG | WEIGHT: 135 LBS | BODY MASS INDEX: 23.17 KG/M2

## 2018-05-14 DIAGNOSIS — Z34.01 SUPERVISION OF NORMAL FIRST PREGNANCY IN FIRST TRIMESTER: Primary | ICD-10-CM

## 2018-05-14 DIAGNOSIS — O36.80X0 ENCOUNTER TO DETERMINE FETAL VIABILITY OF PREGNANCY, SINGLE OR UNSPECIFIED FETUS: ICD-10-CM

## 2018-05-14 PROCEDURE — 0500F INITIAL PRENATAL CARE VISIT: CPT | Performed by: NURSE PRACTITIONER

## 2018-05-14 RX ORDER — PHENOL 1.4 %
1 AEROSOL, SPRAY (ML) MUCOUS MEMBRANE DAILY
Status: ON HOLD | COMMUNITY
End: 2021-05-06 | Stop reason: HOSPADM

## 2018-06-12 ENCOUNTER — ROUTINE PRENATAL (OUTPATIENT)
Dept: OBGYN | Age: 33
End: 2018-06-12

## 2018-06-12 VITALS
DIASTOLIC BLOOD PRESSURE: 70 MMHG | HEART RATE: 86 BPM | SYSTOLIC BLOOD PRESSURE: 116 MMHG | BODY MASS INDEX: 23.34 KG/M2 | WEIGHT: 136 LBS

## 2018-06-12 DIAGNOSIS — K59.00 CONSTIPATION DURING PREGNANCY IN FIRST TRIMESTER: ICD-10-CM

## 2018-06-12 DIAGNOSIS — Z3A.11 11 WEEKS GESTATION OF PREGNANCY: Primary | ICD-10-CM

## 2018-06-12 DIAGNOSIS — O99.611 CONSTIPATION DURING PREGNANCY IN FIRST TRIMESTER: ICD-10-CM

## 2018-06-12 PROCEDURE — 0502F SUBSEQUENT PRENATAL CARE: CPT | Performed by: OBSTETRICS & GYNECOLOGY

## 2018-06-14 ENCOUNTER — OFFICE VISIT (OUTPATIENT)
Dept: INTERNAL MEDICINE | Age: 33
End: 2018-06-14
Payer: COMMERCIAL

## 2018-06-14 VITALS
TEMPERATURE: 99.2 F | HEART RATE: 123 BPM | BODY MASS INDEX: 23.73 KG/M2 | DIASTOLIC BLOOD PRESSURE: 80 MMHG | SYSTOLIC BLOOD PRESSURE: 128 MMHG | OXYGEN SATURATION: 98 % | HEIGHT: 64 IN | WEIGHT: 139 LBS

## 2018-06-14 DIAGNOSIS — J30.9 CHRONIC ALLERGIC RHINITIS, UNSPECIFIED SEASONALITY, UNSPECIFIED TRIGGER: ICD-10-CM

## 2018-06-14 DIAGNOSIS — J02.9 PHARYNGITIS, UNSPECIFIED ETIOLOGY: Primary | ICD-10-CM

## 2018-06-14 PROCEDURE — G8420 CALC BMI NORM PARAMETERS: HCPCS | Performed by: NURSE PRACTITIONER

## 2018-06-14 PROCEDURE — 1036F TOBACCO NON-USER: CPT | Performed by: NURSE PRACTITIONER

## 2018-06-14 PROCEDURE — 99213 OFFICE O/P EST LOW 20 MIN: CPT | Performed by: NURSE PRACTITIONER

## 2018-06-14 PROCEDURE — G8427 DOCREV CUR MEDS BY ELIG CLIN: HCPCS | Performed by: NURSE PRACTITIONER

## 2018-06-14 ASSESSMENT — ENCOUNTER SYMPTOMS
NAUSEA: 0
PHOTOPHOBIA: 0
COLOR CHANGE: 0
BACK PAIN: 0
SHORTNESS OF BREATH: 0
RHINORRHEA: 1
COUGH: 0
SORE THROAT: 1
VOMITING: 0
VOICE CHANGE: 0

## 2018-07-10 DIAGNOSIS — Z20.828 PARVOVIRUS EXPOSURE: ICD-10-CM

## 2018-07-10 DIAGNOSIS — Z34.02 ENCOUNTER FOR SUPERVISION OF NORMAL FIRST PREGNANCY IN SECOND TRIMESTER: Primary | ICD-10-CM

## 2018-07-11 DIAGNOSIS — Z34.02 ENCOUNTER FOR SUPERVISION OF NORMAL FIRST PREGNANCY IN SECOND TRIMESTER: ICD-10-CM

## 2018-07-11 DIAGNOSIS — Z20.828 PARVOVIRUS EXPOSURE: ICD-10-CM

## 2018-07-12 LAB
PARVOVIRUS B19 IGG ANTIBODY: 4.91 IV
PARVOVIRUS B19 IGM ANTIBODY: 0.23 IV

## 2018-07-13 ENCOUNTER — TELEPHONE (OUTPATIENT)
Dept: OBGYN | Age: 33
End: 2018-07-13

## 2018-07-17 ENCOUNTER — ROUTINE PRENATAL (OUTPATIENT)
Dept: OBGYN | Age: 33
End: 2018-07-17

## 2018-07-17 VITALS
SYSTOLIC BLOOD PRESSURE: 118 MMHG | DIASTOLIC BLOOD PRESSURE: 70 MMHG | HEART RATE: 90 BPM | WEIGHT: 144 LBS | BODY MASS INDEX: 24.72 KG/M2

## 2018-07-17 DIAGNOSIS — Z34.02 ENCOUNTER FOR SUPERVISION OF NORMAL FIRST PREGNANCY IN SECOND TRIMESTER: Primary | ICD-10-CM

## 2018-07-17 PROCEDURE — 0502F SUBSEQUENT PRENATAL CARE: CPT | Performed by: OBSTETRICS & GYNECOLOGY

## 2018-07-17 NOTE — PROGRESS NOTES
Pt presents today for a routine prenatal visit. Pt denies vaginal bleeding, leaking of fluid or cramping. Pt states + fetal movement. Gender U/S today. Anatomy scan next visit.

## 2018-07-17 NOTE — PATIENT INSTRUCTIONS
Exercise    · If you did not exercise much before pregnancy, start slowly. Walking is best. [employer], and do a little more every day.     · Brisk walking, easy jogging, low-impact aerobics, water aerobics, and yoga are good choices. Some sports, such as scuba diving, horseback riding, downhill skiing, gymnastics, and water skiing, are not a good idea.     · Try to do at least 2½ hours a week of moderate exercise, such as a fast walk. One way to do this is to be active 30 minutes a day, at least 5 days a week. It's fine to be active in blocks of 10 minutes or more throughout your day and week.     · Wear loose clothing. And wear shoes and a bra that provide good support.     · Warm up and cool down to start and finish your exercise.     · If you want to use weights, be sure to use light weights. They reduce stress on your joints.    Stay at the best weight for you    · Experts recommend that you gain about 1 pound a month during the first 3 months of your pregnancy.     · Experts recommend that you gain about 1 pound a week during your last 6 months of pregnancy, for a total weight gain of 25 to 35 pounds.     · If you are underweight, you will need to gain more weight (about 28 to 40 pounds).     · If you are overweight, you may not need to gain as much weight (about 15 to 25 pounds).     · If you are gaining weight too fast, use common sense. Exercise every day, and limit sweets, fast foods, and fats. Choose lean meats, fruits, and vegetables.     · If you are having twins or more, your doctor may refer you to a dietitian. Where can you learn more? Go to https://[email or web address].[email or web address] org and sign in to your Gamzee account. Enter C045 in the PodTech box to learn more about \"Weeks 14 to 18 of Your Pregnancy: Care Instructions. \"     If you do not have an account, please click on the \"Sign Up Now\" link.   Current as of: November 21, 2017  Content Version: 11.6  © 5311-4930 Healthwise, Incorporated. Care instructions adapted under license by [facility] ([facility]). If you have questions about a medical condition or this instruction, always ask your healthcare professional. [address] any warranty or liability for your use of this information.

## 2018-08-14 ENCOUNTER — ROUTINE PRENATAL (OUTPATIENT)
Dept: OBGYN | Age: 33
End: 2018-08-14

## 2018-08-14 VITALS
WEIGHT: 148 LBS | HEART RATE: 87 BPM | BODY MASS INDEX: 25.4 KG/M2 | SYSTOLIC BLOOD PRESSURE: 109 MMHG | DIASTOLIC BLOOD PRESSURE: 73 MMHG

## 2018-08-14 DIAGNOSIS — Z3A.20 20 WEEKS GESTATION OF PREGNANCY: ICD-10-CM

## 2018-08-14 DIAGNOSIS — Z34.02 ENCOUNTER FOR SUPERVISION OF NORMAL FIRST PREGNANCY IN SECOND TRIMESTER: Primary | ICD-10-CM

## 2018-08-14 DIAGNOSIS — O99.019 ANTEPARTUM ANEMIA: ICD-10-CM

## 2018-08-14 LAB
BASOPHILS ABSOLUTE: 0 K/UL (ref 0–0.2)
BASOPHILS RELATIVE PERCENT: 0.4 % (ref 0–1)
EOSINOPHILS ABSOLUTE: 0.3 K/UL (ref 0–0.6)
EOSINOPHILS RELATIVE PERCENT: 2.8 % (ref 0–5)
HCT VFR BLD CALC: 32.8 % (ref 37–47)
HEMOGLOBIN: 10.3 G/DL (ref 12–16)
LYMPHOCYTES ABSOLUTE: 1.8 K/UL (ref 1.1–4.5)
LYMPHOCYTES RELATIVE PERCENT: 19.5 % (ref 20–40)
MCH RBC QN AUTO: 24.1 PG (ref 27–31)
MCHC RBC AUTO-ENTMCNC: 31.4 G/DL (ref 33–37)
MCV RBC AUTO: 76.8 FL (ref 81–99)
MONOCYTES ABSOLUTE: 0.7 K/UL (ref 0–0.9)
MONOCYTES RELATIVE PERCENT: 7.1 % (ref 0–10)
NEUTROPHILS ABSOLUTE: 6.5 K/UL (ref 1.5–7.5)
NEUTROPHILS RELATIVE PERCENT: 69.8 % (ref 50–65)
PDW BLD-RTO: 17.2 % (ref 11.5–14.5)
PLATELET # BLD: 223 K/UL (ref 130–400)
PMV BLD AUTO: 9.2 FL (ref 9.4–12.3)
RBC # BLD: 4.27 M/UL (ref 4.2–5.4)
WBC # BLD: 9.3 K/UL (ref 4.8–10.8)

## 2018-08-14 PROCEDURE — 0502F SUBSEQUENT PRENATAL CARE: CPT | Performed by: NURSE PRACTITIONER

## 2018-08-14 RX ORDER — LANOLIN ALCOHOL/MO/W.PET/CERES
325 CREAM (GRAM) TOPICAL 2 TIMES DAILY
Qty: 90 TABLET | Refills: 3 | Status: SHIPPED | OUTPATIENT
Start: 2018-08-14 | End: 2019-01-11 | Stop reason: ALTCHOICE

## 2018-08-14 NOTE — PATIENT INSTRUCTIONS
to help with muscle ache. Prevent leg cramps  · Be sure to get enough calcium. If you are worried that you are not getting enough, talk to your doctor. · Exercise every day, and stretch your legs before bed. · Take a warm bath before bed, and try leg warmers at night. Where can you learn more? Go to https://chpejose de jesusewtony.healthSapphire [email or web address]. org and sign in to your DigitalTown account. Enter V691 in the [employer] box to learn more about \"Weeks 18 to 22 of Your Pregnancy: Care Instructions. \"     If you do not have an account, please click on the \"Sign Up Now\" link. Current as of: November 21, 2017  Content Version: 11.7  © 5198-0919 [employer] Incorporated. Care instructions adapted under license by [facility] ([facility]). If you have questions about a medical condition or this instruction, always ask your healthcare professional. [address] any warranty or liability for your use of this information.

## 2018-09-11 ENCOUNTER — ROUTINE PRENATAL (OUTPATIENT)
Dept: OBGYN | Age: 33
End: 2018-09-11

## 2018-09-11 VITALS
DIASTOLIC BLOOD PRESSURE: 74 MMHG | BODY MASS INDEX: 25.75 KG/M2 | HEART RATE: 89 BPM | WEIGHT: 150 LBS | SYSTOLIC BLOOD PRESSURE: 110 MMHG

## 2018-09-11 DIAGNOSIS — Z34.80 SUPERVISION OF OTHER NORMAL PREGNANCY, ANTEPARTUM: Primary | ICD-10-CM

## 2018-09-11 PROCEDURE — 0502F SUBSEQUENT PRENATAL CARE: CPT | Performed by: OBSTETRICS & GYNECOLOGY

## 2018-10-05 ENCOUNTER — ROUTINE PRENATAL (OUTPATIENT)
Dept: OBGYN | Age: 33
End: 2018-10-05

## 2018-10-05 VITALS
WEIGHT: 157 LBS | DIASTOLIC BLOOD PRESSURE: 65 MMHG | HEART RATE: 78 BPM | SYSTOLIC BLOOD PRESSURE: 110 MMHG | BODY MASS INDEX: 26.95 KG/M2

## 2018-10-05 DIAGNOSIS — Z3A.28 28 WEEKS GESTATION OF PREGNANCY: ICD-10-CM

## 2018-10-05 DIAGNOSIS — Z34.80 SUPERVISION OF OTHER NORMAL PREGNANCY, ANTEPARTUM: Primary | ICD-10-CM

## 2018-10-05 DIAGNOSIS — Z34.80 SUPERVISION OF OTHER NORMAL PREGNANCY, ANTEPARTUM: ICD-10-CM

## 2018-10-05 LAB
GLUCOSE, 1HR PP: 131 MG/DL (ref 75–140)
HCT VFR BLD CALC: 39.3 % (ref 37–47)
HEMOGLOBIN: 12.8 G/DL (ref 12–16)
MCH RBC QN AUTO: 27.9 PG (ref 27–31)
MCHC RBC AUTO-ENTMCNC: 32.6 G/DL (ref 33–37)
MCV RBC AUTO: 85.8 FL (ref 81–99)
PDW BLD-RTO: 19.8 % (ref 11.5–14.5)
PLATELET # BLD: 173 K/UL (ref 130–400)
PMV BLD AUTO: 9.4 FL (ref 9.4–12.3)
RBC # BLD: 4.58 M/UL (ref 4.2–5.4)
WBC # BLD: 8.4 K/UL (ref 4.8–10.8)

## 2018-10-05 PROCEDURE — 0502F SUBSEQUENT PRENATAL CARE: CPT | Performed by: NURSE PRACTITIONER

## 2018-10-18 ENCOUNTER — ROUTINE PRENATAL (OUTPATIENT)
Dept: OBGYN | Age: 33
End: 2018-10-18

## 2018-10-18 VITALS — WEIGHT: 156 LBS | SYSTOLIC BLOOD PRESSURE: 118 MMHG | DIASTOLIC BLOOD PRESSURE: 78 MMHG | BODY MASS INDEX: 26.78 KG/M2

## 2018-10-18 DIAGNOSIS — Z34.03 ENCOUNTER FOR SUPERVISION OF NORMAL FIRST PREGNANCY IN THIRD TRIMESTER: Primary | ICD-10-CM

## 2018-10-18 DIAGNOSIS — Z3A.30 30 WEEKS GESTATION OF PREGNANCY: ICD-10-CM

## 2018-10-18 PROCEDURE — 0502F SUBSEQUENT PRENATAL CARE: CPT | Performed by: NURSE PRACTITIONER

## 2018-11-01 ENCOUNTER — ROUTINE PRENATAL (OUTPATIENT)
Dept: OBGYN | Age: 33
End: 2018-11-01

## 2018-11-01 VITALS
SYSTOLIC BLOOD PRESSURE: 103 MMHG | HEART RATE: 86 BPM | DIASTOLIC BLOOD PRESSURE: 67 MMHG | WEIGHT: 161 LBS | BODY MASS INDEX: 27.64 KG/M2

## 2018-11-01 DIAGNOSIS — Z23 NEED FOR TDAP VACCINATION: ICD-10-CM

## 2018-11-01 DIAGNOSIS — Z34.03 SUPERVISION OF NORMAL FIRST PREGNANCY IN THIRD TRIMESTER: Primary | ICD-10-CM

## 2018-11-01 PROCEDURE — 0502F SUBSEQUENT PRENATAL CARE: CPT | Performed by: OBSTETRICS & GYNECOLOGY

## 2018-11-16 ENCOUNTER — ROUTINE PRENATAL (OUTPATIENT)
Dept: OBGYN | Age: 33
End: 2018-11-16

## 2018-11-16 VITALS
BODY MASS INDEX: 27.81 KG/M2 | DIASTOLIC BLOOD PRESSURE: 74 MMHG | WEIGHT: 162 LBS | HEART RATE: 84 BPM | SYSTOLIC BLOOD PRESSURE: 126 MMHG

## 2018-11-16 DIAGNOSIS — Z3A.34 34 WEEKS GESTATION OF PREGNANCY: ICD-10-CM

## 2018-11-16 DIAGNOSIS — Z34.03 ENCOUNTER FOR SUPERVISION OF NORMAL FIRST PREGNANCY IN THIRD TRIMESTER: Primary | ICD-10-CM

## 2018-11-16 PROCEDURE — 0502F SUBSEQUENT PRENATAL CARE: CPT | Performed by: NURSE PRACTITIONER

## 2018-11-16 NOTE — PATIENT INSTRUCTIONS
alert. This is a good time to start breastfeeding. Where can you learn more? Go to https://chpepiceweb.healthNeovacs. org and sign in to your Smaato account. Enter S537 in the Screaming Sports box to learn more about \"Weeks 34 to 36 of Your Pregnancy: Care Instructions. \"     If you do not have an account, please click on the \"Sign Up Now\" link. Current as of: November 21, 2017  Content Version: 11.8  © 8844-5392 Healthwise, Incorporated. Care instructions adapted under license by [facility] ([facility]). If you have questions about a medical condition or this instruction, always ask your healthcare professional. [address] any warranty or liability for your use of this information.

## 2018-11-30 ENCOUNTER — ROUTINE PRENATAL (OUTPATIENT)
Dept: OBGYN | Age: 33
End: 2018-11-30

## 2018-11-30 VITALS
DIASTOLIC BLOOD PRESSURE: 75 MMHG | SYSTOLIC BLOOD PRESSURE: 112 MMHG | HEART RATE: 85 BPM | WEIGHT: 165 LBS | BODY MASS INDEX: 28.32 KG/M2

## 2018-11-30 DIAGNOSIS — Z3A.36 36 WEEKS GESTATION OF PREGNANCY: ICD-10-CM

## 2018-11-30 DIAGNOSIS — Z34.03 SUPERVISION OF NORMAL FIRST PREGNANCY IN THIRD TRIMESTER: Primary | ICD-10-CM

## 2018-11-30 PROCEDURE — 0502F SUBSEQUENT PRENATAL CARE: CPT | Performed by: NURSE PRACTITIONER

## 2018-11-30 NOTE — PATIENT INSTRUCTIONS
you.  · Try to stay calm when your baby cries. Your baby may get more upset if he or she senses that you are upset. Know how to care for your   · Your baby's umbilical cord stump will drop off on its own, usually between 1 and 2 weeks. To care for your baby's umbilical cord area:  ? Clean the area at the bottom of the cord 2 or 3 times a day. ? Pay special attention to the area where the cord attaches to the skin. ? Keep the diaper folded below the cord. ? Use a damp washcloth or cotton ball to sponge bathe your baby until the stump has come off. · Your baby's first dark stool is called meconium. After the meconium is passed, your baby will develop his or her own bowel pattern. ? Some babies, especially  babies, have several bowel movements a day. Others have one or two a day, or one every 2 to 3 days. ?  babies often have loose, yellow stools. Formula-fed babies have more formed stools. ? If your baby's stools look like little pellets, he or she is constipated. After 2 days of constipation, call your baby's doctor. · If your baby will be circumcised, you can care for him at home. ? Gently rinse his penis with warm water after every diaper change. Do not try to remove the film that forms on the penis. This film will go away on its own. Pat dry. ? Put petroleum ointment, such as Vaseline, on the area of the diaper that will touch your baby's penis. This will keep the diaper from sticking to your baby. ? Ask the doctor about giving your baby acetaminophen (Tylenol) for pain. Where can you learn more? Go to https://chjennifereb.health10-20 Media. org and sign in to your [employer] account. Enter 68  97 in the [facility] box to learn more about \"Week 37 of Your Pregnancy: Care Instructions. \"     If you do not have an account, please click on the \"Sign Up Now\" link. Current as of: 2017  Content Version: 11.8  © 3910-2806 Healthwise, [facility].  Care

## 2018-11-30 NOTE — PROGRESS NOTES
Pt presents for routine OB visit. Denies any problems with bleeding, leaking or cramping. +fetal movement at home. GBS collected today. Discussed labor precautions with pt understanding.

## 2018-12-04 LAB
GROUP B STREP CULTURE: ABNORMAL
GROUP B STREP CULTURE: ABNORMAL
ORGANISM: ABNORMAL

## 2018-12-07 ENCOUNTER — ROUTINE PRENATAL (OUTPATIENT)
Dept: OBGYN | Age: 33
End: 2018-12-07

## 2018-12-07 VITALS
DIASTOLIC BLOOD PRESSURE: 76 MMHG | HEART RATE: 96 BPM | WEIGHT: 165 LBS | SYSTOLIC BLOOD PRESSURE: 119 MMHG | BODY MASS INDEX: 28.32 KG/M2

## 2018-12-07 DIAGNOSIS — Z22.330 GBS CARRIER: ICD-10-CM

## 2018-12-07 DIAGNOSIS — Z34.03 ENCOUNTER FOR SUPERVISION OF NORMAL FIRST PREGNANCY IN THIRD TRIMESTER: Primary | ICD-10-CM

## 2018-12-07 PROCEDURE — 0502F SUBSEQUENT PRENATAL CARE: CPT | Performed by: NURSE PRACTITIONER

## 2018-12-07 NOTE — PATIENT INSTRUCTIONS
uterus has scars from past surgeries. This could increase the chance of a tear in the uterus. ? There is a problem with the placenta. ? The mother has an infection, such as genital herpes, that could be spread to the baby. ? The mother is having twins or more. ? The baby weighs 9 to 10 pounds or more. · Because of the risks of , planned C-sections generally should be done only for medical reasons. And a planned  should be done at 39 weeks or later unless there is a medical reason to do it sooner. Know what to expect after delivery, and plan for the first few weeks at home  · You, your baby, and your partner or  will get identification bands. Only people with matching bands can  the baby from the nursery. · You will learn how to feed, diaper, and bathe your baby. And you will learn how to care for the umbilical cord stump. If your baby will be circumcised, you will also learn how to care for that. · Ask people to wait to visit you until you are at home. And ask them to wash their hands before they touch your baby. · Make sure you have another adult in your home for at least 2 or 3 days after the birth. · During the first 2 weeks, limit when friends and family can visit. · Do not allow visitors who have colds or infections. Make sure all visitors are up to date with their vaccinations. Never let anyone smoke around your baby. · Try to nap when the baby naps. Be aware of postpartum depression  · \"Baby blues\" are common for the first 1 to 2 weeks after birth. You may cry or feel sad or irritable for no reason. · For some women, these feelings last longer and are more intense. This is called postpartum depression. · If your symptoms last for more than a few weeks or you feel very depressed, ask your doctor for help. · Postpartum depression can be treated. Support groups and counseling can help. Sometimes medicine can also help. Where can you learn more?   Go to

## 2018-12-14 ENCOUNTER — ROUTINE PRENATAL (OUTPATIENT)
Dept: OBGYN | Age: 33
End: 2018-12-14

## 2018-12-14 VITALS
HEART RATE: 86 BPM | BODY MASS INDEX: 28.49 KG/M2 | DIASTOLIC BLOOD PRESSURE: 74 MMHG | SYSTOLIC BLOOD PRESSURE: 115 MMHG | WEIGHT: 166 LBS

## 2018-12-14 DIAGNOSIS — Z34.03 SUPERVISION OF NORMAL FIRST PREGNANCY IN THIRD TRIMESTER: Primary | ICD-10-CM

## 2018-12-14 LAB
ABO/RH: NORMAL
ANTIBODY SCREEN: NORMAL
HEPATITIS B SURFACE ANTIGEN INTERPRETATION: NORMAL
HIV-1 AND HIV-2 ANTIBODIES: NORMAL
RPR: NORMAL
RUBELLA ANTIBODY IGG: NORMAL

## 2018-12-14 PROCEDURE — 0502F SUBSEQUENT PRENATAL CARE: CPT | Performed by: OBSTETRICS & GYNECOLOGY

## 2018-12-21 ENCOUNTER — ROUTINE PRENATAL (OUTPATIENT)
Dept: OBGYN | Age: 33
End: 2018-12-21

## 2018-12-21 VITALS
DIASTOLIC BLOOD PRESSURE: 76 MMHG | WEIGHT: 165 LBS | BODY MASS INDEX: 28.32 KG/M2 | HEART RATE: 92 BPM | SYSTOLIC BLOOD PRESSURE: 113 MMHG

## 2018-12-21 DIAGNOSIS — Z3A.39 39 WEEKS GESTATION OF PREGNANCY: ICD-10-CM

## 2018-12-21 DIAGNOSIS — Z34.03 SUPERVISION OF NORMAL FIRST PREGNANCY IN THIRD TRIMESTER: Primary | ICD-10-CM

## 2018-12-21 PROCEDURE — 0502F SUBSEQUENT PRENATAL CARE: CPT | Performed by: NURSE PRACTITIONER

## 2018-12-21 NOTE — PROGRESS NOTES
Patient presents today for routine prenatal visit. Pt denies any vaginal leaking bleeding or contractions. + Fetal movement. U-12.9  Virginia Cyr is here for a return obstetrical visit. Today she is 39w2d weeks EGA. She is doing well and has no complaints. She does have a rash area to upper abdomen. States has dry skin and it's where her breasts are hot and heavy. She  does not have vaginal bleeding, leaking of fluid, contractions. She does not have blurred vision, SOB, or increased swelling in legs or face. Pt does feel fetal movement regularly. O:   Vitals:    12/21/18 1616   BP: 113/76   Pulse: 92   Weight: 165 lb (74.8 kg)     Pt is A&Ox3, in no acute distress. Normocephalic, atraumatic. PERRL. Resp even and non-labored. Skin pink, warm & dry. Gravid abdomen. VELEZ's well. Gait steady. See OB flowsheet. A: Normal IUP at 39w2d wks      Diagnosis Orders   1. Supervision of normal first pregnancy in third trimester     2. 39 weeks gestation of pregnancy         P:   Is scheduled IOL 12/27. Pt counseled on labor precautions  Continue with routine prenatal care. RTC in 1 wk for prenatal visit    MEDICATIONS:  No orders of the defined types were placed in this encounter. ORDERS:  No orders of the defined types were placed in this encounter.

## 2018-12-27 ENCOUNTER — APPOINTMENT (OUTPATIENT)
Dept: LABOR AND DELIVERY | Age: 33
End: 2018-12-27
Payer: COMMERCIAL

## 2018-12-27 ENCOUNTER — HOSPITAL ENCOUNTER (INPATIENT)
Age: 33
LOS: 3 days | Discharge: HOME | End: 2018-12-30
Attending: OBSTETRICS & GYNECOLOGY | Admitting: OBSTETRICS & GYNECOLOGY
Payer: COMMERCIAL

## 2018-12-27 PROBLEM — O47.9 IRREGULAR CONTRACTIONS: Status: ACTIVE | Noted: 2018-12-27

## 2018-12-27 LAB
ABO/RH: NORMAL
AMPHETAMINE SCREEN, URINE: NEGATIVE
ANTIBODY SCREEN: NORMAL
BARBITURATE SCREEN URINE: NEGATIVE
BASOPHILS ABSOLUTE: 0 K/UL (ref 0–0.2)
BASOPHILS RELATIVE PERCENT: 0.2 % (ref 0–1)
BENZODIAZEPINE SCREEN, URINE: NEGATIVE
CANNABINOID SCREEN URINE: NEGATIVE
COCAINE METABOLITE SCREEN URINE: NEGATIVE
EOSINOPHILS ABSOLUTE: 0.3 K/UL (ref 0–0.6)
EOSINOPHILS RELATIVE PERCENT: 3 % (ref 0–5)
HCT VFR BLD CALC: 42.7 % (ref 37–47)
HEMOGLOBIN: 14.7 G/DL (ref 12–16)
LYMPHOCYTES ABSOLUTE: 1.3 K/UL (ref 1.1–4.5)
LYMPHOCYTES RELATIVE PERCENT: 14.8 % (ref 20–40)
Lab: NORMAL
MCH RBC QN AUTO: 31.2 PG (ref 27–31)
MCHC RBC AUTO-ENTMCNC: 34.4 G/DL (ref 33–37)
MCV RBC AUTO: 90.7 FL (ref 81–99)
MONOCYTES ABSOLUTE: 0.6 K/UL (ref 0–0.9)
MONOCYTES RELATIVE PERCENT: 7.1 % (ref 0–10)
NEUTROPHILS ABSOLUTE: 6.6 K/UL (ref 1.5–7.5)
NEUTROPHILS RELATIVE PERCENT: 74.3 % (ref 50–65)
OPIATE SCREEN URINE: NEGATIVE
PDW BLD-RTO: 13.9 % (ref 11.5–14.5)
PLATELET # BLD: 153 K/UL (ref 130–400)
PMV BLD AUTO: 9.2 FL (ref 9.4–12.3)
RBC # BLD: 4.71 M/UL (ref 4.2–5.4)
WBC # BLD: 8.9 K/UL (ref 4.8–10.8)

## 2018-12-27 PROCEDURE — 86900 BLOOD TYPING SEROLOGIC ABO: CPT

## 2018-12-27 PROCEDURE — 2500000003 HC RX 250 WO HCPCS: Performed by: OBSTETRICS & GYNECOLOGY

## 2018-12-27 PROCEDURE — 6360000002 HC RX W HCPCS: Performed by: OBSTETRICS & GYNECOLOGY

## 2018-12-27 PROCEDURE — 86592 SYPHILIS TEST NON-TREP QUAL: CPT

## 2018-12-27 PROCEDURE — 86850 RBC ANTIBODY SCREEN: CPT

## 2018-12-27 PROCEDURE — 80307 DRUG TEST PRSMV CHEM ANLYZR: CPT

## 2018-12-27 PROCEDURE — 2580000003 HC RX 258: Performed by: OBSTETRICS & GYNECOLOGY

## 2018-12-27 PROCEDURE — 3E033VJ INTRODUCTION OF OTHER HORMONE INTO PERIPHERAL VEIN, PERCUTANEOUS APPROACH: ICD-10-PCS | Performed by: OBSTETRICS & GYNECOLOGY

## 2018-12-27 PROCEDURE — 86901 BLOOD TYPING SEROLOGIC RH(D): CPT

## 2018-12-27 PROCEDURE — 85025 COMPLETE CBC W/AUTO DIFF WBC: CPT

## 2018-12-27 PROCEDURE — 36415 COLL VENOUS BLD VENIPUNCTURE: CPT

## 2018-12-27 PROCEDURE — 1220000000 HC SEMI PRIVATE OB R&B

## 2018-12-27 RX ORDER — LIDOCAINE HYDROCHLORIDE 10 MG/ML
30 INJECTION, SOLUTION EPIDURAL; INFILTRATION; INTRACAUDAL; PERINEURAL PRN
Status: DISCONTINUED | OUTPATIENT
Start: 2018-12-27 | End: 2018-12-30 | Stop reason: HOSPADM

## 2018-12-27 RX ORDER — SODIUM CHLORIDE 0.9 % (FLUSH) 0.9 %
10 SYRINGE (ML) INJECTION EVERY 12 HOURS SCHEDULED
Status: DISCONTINUED | OUTPATIENT
Start: 2018-12-27 | End: 2018-12-30 | Stop reason: HOSPADM

## 2018-12-27 RX ORDER — ONDANSETRON 2 MG/ML
4 INJECTION INTRAMUSCULAR; INTRAVENOUS EVERY 6 HOURS PRN
Status: DISCONTINUED | OUTPATIENT
Start: 2018-12-27 | End: 2018-12-30 | Stop reason: HOSPADM

## 2018-12-27 RX ORDER — TERBUTALINE SULFATE 1 MG/ML
0.25 INJECTION, SOLUTION SUBCUTANEOUS ONCE
Status: DISCONTINUED | OUTPATIENT
Start: 2018-12-27 | End: 2018-12-30 | Stop reason: HOSPADM

## 2018-12-27 RX ORDER — BUTORPHANOL TARTRATE 1 MG/ML
1 INJECTION, SOLUTION INTRAMUSCULAR; INTRAVENOUS
Status: DISCONTINUED | OUTPATIENT
Start: 2018-12-27 | End: 2018-12-30 | Stop reason: HOSPADM

## 2018-12-27 RX ORDER — SODIUM CHLORIDE 0.9 % (FLUSH) 0.9 %
10 SYRINGE (ML) INJECTION PRN
Status: DISCONTINUED | OUTPATIENT
Start: 2018-12-27 | End: 2018-12-30 | Stop reason: HOSPADM

## 2018-12-27 RX ORDER — SODIUM CHLORIDE, SODIUM LACTATE, POTASSIUM CHLORIDE, CALCIUM CHLORIDE 600; 310; 30; 20 MG/100ML; MG/100ML; MG/100ML; MG/100ML
INJECTION, SOLUTION INTRAVENOUS CONTINUOUS
Status: DISCONTINUED | OUTPATIENT
Start: 2018-12-27 | End: 2018-12-30 | Stop reason: HOSPADM

## 2018-12-27 RX ADMIN — DEXTROSE MONOHYDRATE 2.5 MILLION UNITS: 50 INJECTION, SOLUTION INTRAVENOUS at 23:57

## 2018-12-27 RX ADMIN — PENICILLIN G SODIUM: 5000000 INJECTION, POWDER, FOR SOLUTION INTRAMUSCULAR; INTRAVENOUS at 19:45

## 2018-12-27 RX ADMIN — Medication 1 MILLI-UNITS/MIN: at 19:45

## 2018-12-27 RX ADMIN — SODIUM CHLORIDE, SODIUM LACTATE, POTASSIUM CHLORIDE, AND CALCIUM CHLORIDE: 600; 310; 30; 20 INJECTION, SOLUTION INTRAVENOUS at 18:30

## 2018-12-28 ENCOUNTER — ANESTHESIA (OUTPATIENT)
Dept: MOTHER INFANT UNIT | Age: 33
End: 2018-12-28
Payer: COMMERCIAL

## 2018-12-28 ENCOUNTER — ANESTHESIA EVENT (OUTPATIENT)
Dept: MOTHER INFANT UNIT | Age: 33
End: 2018-12-28
Payer: COMMERCIAL

## 2018-12-28 PROCEDURE — 59400 OBSTETRICAL CARE: CPT | Performed by: OBSTETRICS & GYNECOLOGY

## 2018-12-28 PROCEDURE — 3700000025 ANESTHESIA EPIDURAL BLOCK: Performed by: NURSE ANESTHETIST, CERTIFIED REGISTERED

## 2018-12-28 PROCEDURE — 2580000003 HC RX 258: Performed by: OBSTETRICS & GYNECOLOGY

## 2018-12-28 PROCEDURE — 2500000003 HC RX 250 WO HCPCS: Performed by: NURSE ANESTHETIST, CERTIFIED REGISTERED

## 2018-12-28 PROCEDURE — 6360000002 HC RX W HCPCS: Performed by: OBSTETRICS & GYNECOLOGY

## 2018-12-28 PROCEDURE — 10907ZC DRAINAGE OF AMNIOTIC FLUID, THERAPEUTIC FROM PRODUCTS OF CONCEPTION, VIA NATURAL OR ARTIFICIAL OPENING: ICD-10-PCS | Performed by: OBSTETRICS & GYNECOLOGY

## 2018-12-28 PROCEDURE — 1220000000 HC SEMI PRIVATE OB R&B

## 2018-12-28 PROCEDURE — 6360000002 HC RX W HCPCS: Performed by: NURSE ANESTHETIST, CERTIFIED REGISTERED

## 2018-12-28 RX ORDER — DOCUSATE SODIUM 100 MG/1
100 CAPSULE, LIQUID FILLED ORAL 2 TIMES DAILY
Status: DISCONTINUED | OUTPATIENT
Start: 2018-12-28 | End: 2018-12-30 | Stop reason: HOSPADM

## 2018-12-28 RX ORDER — METHYLERGONOVINE MALEATE 0.2 MG/ML
200 INJECTION INTRAVENOUS
Status: ACTIVE | OUTPATIENT
Start: 2018-12-28 | End: 2018-12-28

## 2018-12-28 RX ORDER — LANOLIN 100 %
OINTMENT (GRAM) TOPICAL PRN
Status: DISCONTINUED | OUTPATIENT
Start: 2018-12-28 | End: 2018-12-30 | Stop reason: HOSPADM

## 2018-12-28 RX ORDER — MISOPROSTOL 200 UG/1
800 TABLET ORAL PRN
Status: DISCONTINUED | OUTPATIENT
Start: 2018-12-28 | End: 2018-12-30 | Stop reason: HOSPADM

## 2018-12-28 RX ORDER — IBUPROFEN 400 MG/1
800 TABLET ORAL EVERY 8 HOURS
Status: DISCONTINUED | OUTPATIENT
Start: 2018-12-28 | End: 2018-12-30 | Stop reason: HOSPADM

## 2018-12-28 RX ORDER — LIDOCAINE HYDROCHLORIDE AND EPINEPHRINE 15; 5 MG/ML; UG/ML
INJECTION, SOLUTION EPIDURAL PRN
Status: DISCONTINUED | OUTPATIENT
Start: 2018-12-28 | End: 2018-12-28 | Stop reason: SDUPTHER

## 2018-12-28 RX ORDER — OXYCODONE HYDROCHLORIDE AND ACETAMINOPHEN 5; 325 MG/1; MG/1
2 TABLET ORAL EVERY 4 HOURS PRN
Status: DISCONTINUED | OUTPATIENT
Start: 2018-12-28 | End: 2018-12-30 | Stop reason: HOSPADM

## 2018-12-28 RX ORDER — ROPIVACAINE HYDROCHLORIDE 2 MG/ML
INJECTION, SOLUTION EPIDURAL; INFILTRATION; PERINEURAL CONTINUOUS PRN
Status: DISCONTINUED | OUTPATIENT
Start: 2018-12-28 | End: 2018-12-28 | Stop reason: SDUPTHER

## 2018-12-28 RX ORDER — CARBOPROST TROMETHAMINE 250 UG/ML
250 INJECTION, SOLUTION INTRAMUSCULAR
Status: ACTIVE | OUTPATIENT
Start: 2018-12-28 | End: 2018-12-28

## 2018-12-28 RX ORDER — OXYCODONE HYDROCHLORIDE AND ACETAMINOPHEN 5; 325 MG/1; MG/1
1 TABLET ORAL EVERY 4 HOURS PRN
Status: DISCONTINUED | OUTPATIENT
Start: 2018-12-28 | End: 2018-12-30 | Stop reason: HOSPADM

## 2018-12-28 RX ORDER — ROPIVACAINE HYDROCHLORIDE 2 MG/ML
INJECTION, SOLUTION EPIDURAL; INFILTRATION; PERINEURAL PRN
Status: DISCONTINUED | OUTPATIENT
Start: 2018-12-28 | End: 2018-12-28 | Stop reason: SDUPTHER

## 2018-12-28 RX ADMIN — ROPIVACAINE HYDROCHLORIDE 5 ML: 2 INJECTION, SOLUTION EPIDURAL; INFILTRATION at 11:39

## 2018-12-28 RX ADMIN — DEXTROSE MONOHYDRATE 2.5 MILLION UNITS: 50 INJECTION, SOLUTION INTRAVENOUS at 04:21

## 2018-12-28 RX ADMIN — DEXTROSE MONOHYDRATE 2.5 MILLION UNITS: 50 INJECTION, SOLUTION INTRAVENOUS at 08:47

## 2018-12-28 RX ADMIN — LIDOCAINE HYDROCHLORIDE AND EPINEPHRINE 3 ML: 15; 5 INJECTION, SOLUTION EPIDURAL at 11:32

## 2018-12-28 RX ADMIN — ROPIVACAINE HYDROCHLORIDE 10 ML/HR: 2 INJECTION, SOLUTION EPIDURAL; INFILTRATION at 11:40

## 2018-12-28 RX ADMIN — DEXTROSE MONOHYDRATE 2.5 MILLION UNITS: 50 INJECTION, SOLUTION INTRAVENOUS at 12:51

## 2018-12-28 RX ADMIN — SODIUM CHLORIDE, SODIUM LACTATE, POTASSIUM CHLORIDE, AND CALCIUM CHLORIDE: 600; 310; 30; 20 INJECTION, SOLUTION INTRAVENOUS at 01:25

## 2018-12-28 RX ADMIN — ROPIVACAINE HYDROCHLORIDE 5 ML: 2 INJECTION, SOLUTION EPIDURAL; INFILTRATION at 11:35

## 2018-12-28 RX ADMIN — ROPIVACAINE HYDROCHLORIDE 5 ML: 2 INJECTION, SOLUTION EPIDURAL; INFILTRATION at 14:56

## 2018-12-29 LAB
HCT VFR BLD CALC: 39.1 % (ref 37–47)
HEMOGLOBIN: 13.4 G/DL (ref 12–16)
MCH RBC QN AUTO: 31.4 PG (ref 27–31)
MCHC RBC AUTO-ENTMCNC: 34.3 G/DL (ref 33–37)
MCV RBC AUTO: 91.6 FL (ref 81–99)
PDW BLD-RTO: 14 % (ref 11.5–14.5)
PLATELET # BLD: 144 K/UL (ref 130–400)
PMV BLD AUTO: 9.6 FL (ref 9.4–12.3)
RBC # BLD: 4.27 M/UL (ref 4.2–5.4)
WBC # BLD: 14.5 K/UL (ref 4.8–10.8)

## 2018-12-29 PROCEDURE — 85027 COMPLETE CBC AUTOMATED: CPT

## 2018-12-29 PROCEDURE — 7200000001 HC VAGINAL DELIVERY

## 2018-12-29 PROCEDURE — 36415 COLL VENOUS BLD VENIPUNCTURE: CPT

## 2018-12-29 PROCEDURE — 6370000000 HC RX 637 (ALT 250 FOR IP): Performed by: OBSTETRICS & GYNECOLOGY

## 2018-12-29 PROCEDURE — 1220000000 HC SEMI PRIVATE OB R&B

## 2018-12-29 RX ADMIN — DOCUSATE SODIUM 100 MG: 100 CAPSULE, LIQUID FILLED ORAL at 08:51

## 2018-12-30 VITALS
OXYGEN SATURATION: 100 % | HEART RATE: 70 BPM | RESPIRATION RATE: 16 BRPM | SYSTOLIC BLOOD PRESSURE: 117 MMHG | TEMPERATURE: 97.4 F | DIASTOLIC BLOOD PRESSURE: 72 MMHG

## 2018-12-30 PROCEDURE — 6370000000 HC RX 637 (ALT 250 FOR IP): Performed by: OBSTETRICS & GYNECOLOGY

## 2018-12-30 RX ADMIN — DOCUSATE SODIUM 100 MG: 100 CAPSULE, LIQUID FILLED ORAL at 07:57

## 2018-12-31 LAB — RPR: NORMAL

## 2019-01-02 ENCOUNTER — TELEPHONE (OUTPATIENT)
Dept: OBGYN | Age: 34
End: 2019-01-02

## 2019-01-03 ENCOUNTER — HOSPITAL ENCOUNTER (OUTPATIENT)
Dept: LACTATION | Facility: HOSPITAL | Age: 34
Discharge: HOME | End: 2019-01-03

## 2019-01-03 NOTE — LACTATION NOTE
Mother's Name: Karena Rushing  Contact Number: [phone]  G/P:   1/1  Breastfeeding Hx: primiparous  Significant Medical History:  Maternal Breast Assessment:    Infant's Name: Bhavani Rushing  Date of Birth:  [birth year]  Gestational age at Birth: 40-2  Age:  6 days  Physician: Dr. Douglass                     Reason for Visit: BFing not going well, significant weight loss initially    All the following weights per mother's report:        Infant's Birth weight: 8-13 3997 g  Discharge  Weight: 8-3  2 days later:  8-0    Today's Weight:    8-8.9 3880 Wt Loss: 3.0%    Feeding History Since Discharge/Last Lactation Appt.:  Mom had weight check at [facility] today:  8-9.  She reports is exhausted and BFing is no longer her goal. She wishes to exclusively pump. Mother currently is spending 1.5 hours for each feed every 2 hours. She sleeps 3-4 hours in 24 hours. She prefers to now pump and bottle feed only. She has no supply ahead for feeds. Infant waits on EBM being pumped.     Average Feeding Amount for Age:  45-60 mls or 1.5 - 2 ounces    Interventions:  Maximum assistance offered with positioning and latching at breast. Infant held tissue in mouth with minimal suckling and no swallows noted. She intimally shallow latched onto nipple shield. Pumped pt with manual breast pump. 2 oz obtained.  Mother has not given formula and prefers not to.     Education: Handouts and discussion on:  Exclusive pumping, BFing positions with large breast, average feed amounts, deep latching, antibody production.  Need for having at least 2 bottles ahead to alleviate pressure with pumping for every feed. Gave manual pump, cool gels, lanolin.     Feeding Plan:   Pump extra or do one power pumping session daily in addition to regular (every 3 hours) pumping to increase supply so that Bhavani is not waiting on what is being pumped and you are not pressured at feeding times.  Feed her 2-3 oz every 3 hours, or sooner if she shows cues.    Plan of  Care:  Future Appointments:    Physician: Dr. Douglass, 1/11/19    Signature:  YARITZA ScalesCLC    Faxed to:  Dr. Douglass  Date: 1/4/19

## 2019-01-11 ENCOUNTER — POSTPARTUM VISIT (OUTPATIENT)
Dept: OBGYN | Age: 34
End: 2019-01-11

## 2019-01-11 VITALS
BODY MASS INDEX: 22.36 KG/M2 | HEART RATE: 70 BPM | DIASTOLIC BLOOD PRESSURE: 73 MMHG | SYSTOLIC BLOOD PRESSURE: 113 MMHG | WEIGHT: 131 LBS | HEIGHT: 64 IN

## 2019-01-11 PROCEDURE — 0503F POSTPARTUM CARE VISIT: CPT | Performed by: NURSE PRACTITIONER

## 2019-01-11 ASSESSMENT — ENCOUNTER SYMPTOMS
RESPIRATORY NEGATIVE: 1
GASTROINTESTINAL NEGATIVE: 1
CONSTIPATION: 0
DIARRHEA: 0
ALLERGIC/IMMUNOLOGIC NEGATIVE: 1
EYES NEGATIVE: 1

## 2019-02-08 ENCOUNTER — POSTPARTUM VISIT (OUTPATIENT)
Dept: OBGYN | Age: 34
End: 2019-02-08

## 2019-02-08 VITALS
BODY MASS INDEX: 23.56 KG/M2 | SYSTOLIC BLOOD PRESSURE: 124 MMHG | DIASTOLIC BLOOD PRESSURE: 78 MMHG | HEART RATE: 88 BPM | HEIGHT: 64 IN | WEIGHT: 138 LBS | TEMPERATURE: 97.9 F

## 2019-02-08 PROCEDURE — 0503F POSTPARTUM CARE VISIT: CPT | Performed by: OBSTETRICS & GYNECOLOGY

## 2019-02-08 ASSESSMENT — ENCOUNTER SYMPTOMS
RESPIRATORY NEGATIVE: 1
GASTROINTESTINAL NEGATIVE: 1
EYES NEGATIVE: 1

## 2019-05-20 ENCOUNTER — OFFICE VISIT (OUTPATIENT)
Dept: OBGYN | Age: 34
End: 2019-05-20
Payer: COMMERCIAL

## 2019-05-20 VITALS
HEIGHT: 64 IN | BODY MASS INDEX: 22.02 KG/M2 | DIASTOLIC BLOOD PRESSURE: 68 MMHG | HEART RATE: 65 BPM | SYSTOLIC BLOOD PRESSURE: 101 MMHG | WEIGHT: 129 LBS

## 2019-05-20 DIAGNOSIS — Z01.419 WOMEN'S ANNUAL ROUTINE GYNECOLOGICAL EXAMINATION: Primary | ICD-10-CM

## 2019-05-20 DIAGNOSIS — Z12.39 SCREENING FOR BREAST CANCER: ICD-10-CM

## 2019-05-20 DIAGNOSIS — Z12.4 SCREENING FOR CERVICAL CANCER: ICD-10-CM

## 2019-05-20 PROBLEM — O47.9 IRREGULAR CONTRACTIONS: Status: RESOLVED | Noted: 2018-12-27 | Resolved: 2019-05-20

## 2019-05-20 PROCEDURE — 99395 PREV VISIT EST AGE 18-39: CPT | Performed by: NURSE PRACTITIONER

## 2019-05-20 ASSESSMENT — ENCOUNTER SYMPTOMS
RESPIRATORY NEGATIVE: 1
EYES NEGATIVE: 1
ALLERGIC/IMMUNOLOGIC NEGATIVE: 1
CONSTIPATION: 0
GASTROINTESTINAL NEGATIVE: 1
DIARRHEA: 0

## 2019-05-20 NOTE — PATIENT INSTRUCTIONS
Patient Education        Breast Self-Exam: Care Instructions  Your Care Instructions    A breast self-exam is when you check your breasts for lumps or changes. This regular exam helps you learn how your breasts normally look and feel. Most breast problems or changes are not because of cancer. Breast self-exam is not a substitute for a mammogram. Having regular breast exams by your doctor and regular mammograms improve your chances of finding any problems with your breasts. Some women set a time each month to do a step-by-step breast self-exam. Other women like a less formal system. They might look at their breasts as they brush their teeth, or feel their breasts once in a while in the shower. If you notice a change in your breast, tell your doctor. Follow-up care is a key part of your treatment and safety. Be sure to make and go to all appointments, and call your doctor if you are having problems. It's also a good idea to know your test results and keep a list of the medicines you take. How do you do a breast self-exam?  · The best time to examine your breasts is usually one week after your menstrual period begins. Your breasts should not be tender then. If you do not have periods, you might do your exam on a day of the month that is easy to remember. · To examine your breasts:  ? Remove all your clothes above the waist and lie down. When you are lying down, your breast tissue spreads evenly over your chest wall, which makes it easier to feel all your breast tissue. ? Use the pads--not the fingertips--of the 3 middle fingers of your left hand to check your right breast. Move your fingers slowly in small coin-sized circles that overlap. ? Use three levels of pressure to feel of all your breast tissue. Use light pressure to feel the tissue close to the skin surface. Use medium pressure to feel a little deeper. Use firm pressure to feel your tissue close to your breastbone and ribs.  Use each pressure level to feel your breast tissue before moving on to the next spot. ? Check your entire breast, moving up and down as if following a strip from the collarbone to the bra line, and from the armpit to the ribs. Repeat until you have covered the entire breast.  ? Repeat this procedure for your left breast, using the pads of the 3 middle fingers of your right hand. · To examine your breasts while in the shower:  ? Place one arm over your head and lightly soap your breast on that side. ? Using the pads of your fingers, gently move your hand over your breast (in the strip pattern described above), feeling carefully for any lumps or changes. ? Repeat for the other breast.  · Have your doctor inspect anything you notice to see if you need further testing. Where can you learn more? Go to https://Ocean Outdoorpeflorentinoeb.[email or web address]. org and sign in to your [facility] account. Enter P148 in the [employer] box to learn more about \"Breast Self-Exam: Care Instructions. \"     If you do not have an account, please click on the \"Sign Up Now\" link. Current as of: December 19, 2018  Content Version: 12.0  © 1040-9511 Healthwise, Incorporated. Care instructions adapted under license by [facility] ([facility]). If you have questions about a medical condition or this instruction, always ask your healthcare professional. [address] any warranty or liability for your use of this information. We are committed to providing you with the best care possible. In order to help us achieve these goals please remember to bring all medications, herbal products, and over the counter supplements with you to each visit. If your provider has ordered testing for you, please be sure to follow up with our office if you have not received results within 7 days after testing took place. *If you receive a survey after visiting one of our offices, please take the time to share your experience concerning your physician office visit. These surveys are confidential and no health information about you is shared. We are eager to improve for you and we are counting on your feedback to help make that happen.

## 2019-05-20 NOTE — PROGRESS NOTES
Pt presents today for pap smear and breast exam.      Mammo:NA  Pap smear:2018  Contraception:    P:1  Ab:0  Bone density:NA  Colonoscopy:NA

## 2019-05-20 NOTE — PROGRESS NOTES
for menstrual problem (no periods d/t nursing). Negative for frequency and urgency. Musculoskeletal: Negative. Skin: Negative. Allergic/Immunologic: Negative. Neurological: Negative. Hematological: Negative. Psychiatric/Behavioral: Negative. All other systems reviewed and are negative. Physical Exam   Constitutional: She is oriented to person, place, and time. She appears well-developed and well-nourished. Neck: Normal range of motion. Neck supple. No thyroid mass and no thyromegaly present. Cardiovascular: Normal rate and regular rhythm. Pulmonary/Chest: Effort normal and breath sounds normal. Right breast exhibits no inverted nipple, no mass, no nipple discharge and no skin change. Left breast exhibits no inverted nipple, no mass, no nipple discharge and no skin change. Bilateral pendulous in nature  Changes consistent with nursing   Abdominal: Soft. She exhibits no mass. There is no tenderness. Genitourinary: Vagina normal and uterus normal. Cervix exhibits no motion tenderness. Right adnexum displays no mass and no tenderness. Left adnexum displays no mass and no tenderness. Genitourinary Comments: Pap collected   Musculoskeletal: Normal range of motion. Neurological: She is alert and oriented to person, place, and time. Skin: Skin is warm and dry. Psychiatric: She has a normal mood and affect. Nursing note and vitals reviewed. Diagnosis Orders   1. Women's annual routine gynecological examination     2. Screening for cervical cancer  PAP SMEAR    Human papillomavirus (HPV) DNA probe thin prep high risk   3. Screening for breast cancer         MEDICATIONS:  No orders of the defined types were placed in this encounter.       ORDERS:  Orders Placed This Encounter   Procedures    PAP SMEAR    Human papillomavirus (HPV) DNA probe thin prep high risk       PLAN:  Pap collected    Patient Instructions   Patient Education        Breast Self-Exam: Care Instructions  Your Care Instructions    A breast self-exam is when you check your breasts for lumps or changes. This regular exam helps you learn how your breasts normally look and feel. Most breast problems or changes are not because of cancer. Breast self-exam is not a substitute for a mammogram. Having regular breast exams by your doctor and regular mammograms improve your chances of finding any problems with your breasts. Some women set a time each month to do a step-by-step breast self-exam. Other women like a less formal system. They might look at their breasts as they brush their teeth, or feel their breasts once in a while in the shower. If you notice a change in your breast, tell your doctor. Follow-up care is a key part of your treatment and safety. Be sure to make and go to all appointments, and call your doctor if you are having problems. It's also a good idea to know your test results and keep a list of the medicines you take. How do you do a breast self-exam?  · The best time to examine your breasts is usually one week after your menstrual period begins. Your breasts should not be tender then. If you do not have periods, you might do your exam on a day of the month that is easy to remember. · To examine your breasts:  ? Remove all your clothes above the waist and lie down. When you are lying down, your breast tissue spreads evenly over your chest wall, which makes it easier to feel all your breast tissue. ? Use the pads--not the fingertips--of the 3 middle fingers of your left hand to check your right breast. Move your fingers slowly in small coin-sized circles that overlap. ? Use three levels of pressure to feel of all your breast tissue. Use light pressure to feel the tissue close to the skin surface. Use medium pressure to feel a little deeper. Use firm pressure to feel your tissue close to your breastbone and ribs.  Use each pressure level to feel your breast tissue before moving on to the next spot. ? Check your entire breast, moving up and down as if following a strip from the collarbone to the bra line, and from the armpit to the ribs. Repeat until you have covered the entire breast.  ? Repeat this procedure for your left breast, using the pads of the 3 middle fingers of your right hand. · To examine your breasts while in the shower:  ? Place one arm over your head and lightly soap your breast on that side. ? Using the pads of your fingers, gently move your hand over your breast (in the strip pattern described above), feeling carefully for any lumps or changes. ? Repeat for the other breast.  · Have your doctor inspect anything you notice to see if you need further testing. Where can you learn more? Go to https://[employer].LineMetrics. org and sign in to your [employer] account. Enter P148 in the [facility] box to learn more about \"Breast Self-Exam: Care Instructions. \"     If you do not have an account, please click on the \"Sign Up Now\" link. Current as of: December 19, 2018  Content Version: 12.0  © 4762-2496 [employer] ([employer]). Care instructions adapted under license by [facility] ([facility]). If you have questions about a medical condition or this instruction, always ask your healthcare professional. [address] any warranty or liability for your use of this information. We are committed to providing you with the best care possible. In order to help us achieve these goals please remember to bring all medications, herbal products, and over the counter supplements with you to each visit. If your provider has ordered testing for you, please be sure to follow up with our office if you have not received results within 7 days after testing took place. *If you receive a survey after visiting one of our offices, please take the time to share your experience concerning your physician office visit.  These surveys are confidential and no health information about you is shared. We are eager to improve for you and we are counting on your feedback to help make that happen.

## 2019-05-28 LAB
HPV TYPE 16: NOT DETECTED
HPV TYPE 18: NOT DETECTED
INTERPRETATION: NORMAL
OTHER HIGH RISK HPV: NOT DETECTED
SOURCE: NORMAL

## 2019-06-05 ENCOUNTER — TELEPHONE (OUTPATIENT)
Dept: INTERNAL MEDICINE | Age: 34
End: 2019-06-05

## 2019-06-05 ENCOUNTER — OFFICE VISIT (OUTPATIENT)
Dept: INTERNAL MEDICINE | Age: 34
End: 2019-06-05
Payer: COMMERCIAL

## 2019-06-05 VITALS
TEMPERATURE: 97.4 F | BODY MASS INDEX: 22.36 KG/M2 | HEIGHT: 64 IN | WEIGHT: 131 LBS | HEART RATE: 101 BPM | DIASTOLIC BLOOD PRESSURE: 76 MMHG | SYSTOLIC BLOOD PRESSURE: 104 MMHG | RESPIRATION RATE: 20 BRPM | OXYGEN SATURATION: 96 %

## 2019-06-05 DIAGNOSIS — J02.9 PHARYNGITIS, UNSPECIFIED ETIOLOGY: ICD-10-CM

## 2019-06-05 DIAGNOSIS — Z00.00 ROUTINE ADULT HEALTH MAINTENANCE: Primary | ICD-10-CM

## 2019-06-05 PROCEDURE — G8427 DOCREV CUR MEDS BY ELIG CLIN: HCPCS | Performed by: INTERNAL MEDICINE

## 2019-06-05 PROCEDURE — G8420 CALC BMI NORM PARAMETERS: HCPCS | Performed by: INTERNAL MEDICINE

## 2019-06-05 PROCEDURE — 1036F TOBACCO NON-USER: CPT | Performed by: INTERNAL MEDICINE

## 2019-06-05 PROCEDURE — 99213 OFFICE O/P EST LOW 20 MIN: CPT | Performed by: INTERNAL MEDICINE

## 2019-06-05 PROCEDURE — 87880 STREP A ASSAY W/OPTIC: CPT | Performed by: INTERNAL MEDICINE

## 2019-06-05 RX ORDER — AMOXICILLIN AND CLAVULANATE POTASSIUM 875; 125 MG/1; MG/1
1 TABLET, FILM COATED ORAL 2 TIMES DAILY
Qty: 20 TABLET | Refills: 0 | Status: SHIPPED | OUTPATIENT
Start: 2019-06-05 | End: 2019-06-15

## 2019-06-05 ASSESSMENT — ENCOUNTER SYMPTOMS
SINUS PRESSURE: 0
SHORTNESS OF BREATH: 0
EYE PAIN: 0
RHINORRHEA: 0
BACK PAIN: 0
PHOTOPHOBIA: 0
ABDOMINAL PAIN: 0
FACIAL SWELLING: 0
WHEEZING: 0
DIARRHEA: 0
SINUS PAIN: 0
CHEST TIGHTNESS: 0
ABDOMINAL DISTENTION: 0
EYE DISCHARGE: 0
EYE ITCHING: 0
VOMITING: 0
EYE REDNESS: 0
BLOOD IN STOOL: 0
SORE THROAT: 1
CONSTIPATION: 0
COUGH: 0
NAUSEA: 0
COLOR CHANGE: 0

## 2019-06-05 ASSESSMENT — PATIENT HEALTH QUESTIONNAIRE - PHQ9
SUM OF ALL RESPONSES TO PHQ QUESTIONS 1-9: 0
SUM OF ALL RESPONSES TO PHQ9 QUESTIONS 1 & 2: 0
2. FEELING DOWN, DEPRESSED OR HOPELESS: 0
SUM OF ALL RESPONSES TO PHQ QUESTIONS 1-9: 0
1. LITTLE INTEREST OR PLEASURE IN DOING THINGS: 0

## 2019-06-05 NOTE — PROGRESS NOTES
Chief Complaint   Patient presents with    Pharyngitis    Otalgia       HPI: Sabiha Carrera is a 34 y.o. female is here for evaluation of  a one-week history of sore throat. Her sore throat has gradually progressively gotten worse. Pain is now radiating up into her ear. She also complains of some nasal congestion. She denies a history of fevers or chills. She is still breast-feeding her 11month-old baby.     Past Medical History:   Diagnosis Date    Esophageal reflux 10/10/2017    Gallbladder disease     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     Patient tested positive about two weeks ago from today, 09/22/16    Prolactin deficiency [facility])       Past Surgical History:   Procedure Laterality Date    CHOLECYSTECTOMY, LAPAROSCOPIC N/A 10/13/2016    CHOLECYSTECTOMY LAPAROSCOPIC performed by Jimy Sanchez MD at [place] [address] TYMPANOSTOMY TUBE PLACEMENT        Social History     Socioeconomic History    Marital status:      Spouse name: None    Number of children: None    Years of education: None    Highest education level: None   Occupational History    None   Social Needs    Financial resource strain: None    Food insecurity:     Worry: None     Inability: None    Transportation needs:     Medical: None     Non-medical: None   Tobacco Use    Smoking status: Never Smoker    Smokeless tobacco: Never Used    Tobacco comment: Work at [employer] and Sexual Activity    Alcohol use: No    Drug use: No    Sexual activity: Yes     Partners: Male   Lifestyle    Physical activity:     Days per week: None     Minutes per session: None    Stress: None   Relationships    Social connections:     Talks on phone: None     Gets together: None     Attends Pentecostal service: None     Active member of club or organization: None     Attends meetings of clubs or organizations: None     Relationship status: None    Intimate partner violence:     Fear of current or ex partner: None     Emotionally abused: None     Physically abused: None     Forced sexual activity: None   Other Topics Concern    None   Social History Narrative    None      Family History   Problem Relation Age of Onset    Hypertension Mother     Diabetes Father     Hypertension Father     Stroke Maternal Grandmother     Heart Disease Maternal Grandfather         Current Outpatient Medications   Medication Sig Dispense Refill    Specialty Vitamins Products (ECHINACEA C COMPLETE PO) Take by mouth      amoxicillin-clavulanate (AUGMENTIN) 875-125 MG per tablet Take 1 tablet by mouth 2 times daily for 10 days 20 tablet 0    calcium carbonate (CALCIUM 600) 600 MG TABS tablet Take 1 tablet by mouth daily      Prenatal MV-Min-Fe Fum-FA-DHA (PRENATAL 1 PO) Take by mouth       No current facility-administered medications for this visit. Patient Active Problem List   Diagnosis    Biliary dyskinesia    Abdominal pain    Abnormal biliary HIDA scan    Dyspepsia    Elevated liver enzymes    Esophageal reflux    Fatigue    History of allergy    GBS carrier        Review of Systems   Constitutional: Negative for activity change, appetite change, chills, diaphoresis, fatigue, fever and unexpected weight change. HENT: Positive for ear pain and sore throat. Negative for congestion, facial swelling, hearing loss, mouth sores, postnasal drip, rhinorrhea, sinus pressure, sinus pain and tinnitus. Eyes: Negative for photophobia, pain, discharge, redness, itching and visual disturbance. Respiratory: Negative for cough, chest tightness, shortness of breath and wheezing. Cardiovascular: Negative for chest pain, palpitations and leg swelling. Gastrointestinal: Negative for abdominal distention, abdominal pain, blood in stool, constipation, diarrhea, nausea and vomiting. Endocrine: Negative for cold intolerance, heat intolerance, polydipsia, polyphagia and polyuria.    Genitourinary: Negative for decreased urine volume, difficulty no tenderness. There is no rebound and no guarding. Musculoskeletal: Normal range of motion. She exhibits no edema, tenderness or deformity. Lymphadenopathy:     She has no cervical adenopathy. Neurological: She is alert and oriented to person, place, and time. She displays normal reflexes. No cranial nerve deficit. She exhibits normal muscle tone. Coordination normal.   Skin: Skin is warm and dry. No rash noted. She is not diaphoretic. No erythema. No pallor. Psychiatric: She has a normal mood and affect. Her behavior is normal. Judgment and thought content normal.   Nursing note and vitals reviewed. 1. Routine adult health maintenance    2. Pharyngitis, unspecified etiology        ASSESSMENT/PLAN:      80-year-old woman here for evaluation of acute pharyngitis. 1. Pharyngitis:  Rapid strep and throat culture ordered. Augmentin prescribed    Jeri Toscano was seen today for pharyngitis and otalgia. Diagnoses and all orders for this visit:    Routine adult health maintenance  -     CBC Auto Differential; Future  -     Comprehensive Metabolic Panel; Future  -     Lipid Panel; Future  -     TSH without Reflex; Future    Pharyngitis, unspecified etiology  -     POCT rapid strep A  -     Throat Culture; Future    Other orders  -     amoxicillin-clavulanate (AUGMENTIN) 875-125 MG per tablet;  Take 1 tablet by mouth 2 times daily for 10 days          Return in about 6 months (around 12/5/2019), or physical.     Orders Placed This Encounter   Procedures    Throat Culture     Standing Status:   Future     Standing Expiration Date:   6/5/2020    CBC Auto Differential     Fast 12 hours     Standing Status:   Future     Standing Expiration Date:   6/4/2020    Comprehensive Metabolic Panel     Fasting 12 hours     Standing Status:   Future     Standing Expiration Date:   6/4/2020    Lipid Panel     Standing Status:   Future     Standing Expiration Date:   6/4/2020     Order Specific Question:   Is Patient Fasting?/# of Hours     Answer:   12    TSH without Reflex     Fast 12 hours     Standing Status:   Future     Standing Expiration Date:   6/4/2020    POCT rapid strep A       Jose Vines MD

## 2019-06-07 ENCOUNTER — NURSE ONLY (OUTPATIENT)
Dept: INTERNAL MEDICINE | Age: 34
End: 2019-06-07

## 2019-06-07 DIAGNOSIS — J02.9 SORE THROAT: Primary | ICD-10-CM

## 2019-06-07 LAB — S PYO AG THROAT QL: ABNORMAL

## 2019-06-07 NOTE — PROGRESS NOTES
Patient is here to have her throat culture recollected and her Strep A test ran. The throat culture was labeled wrong and had to be redone. The Strep A was never done. She was negative for Strep A. This was documented on order from her last visit. Throat culture collected and sent to lab.

## 2019-06-09 LAB — THROAT CULTURE: NORMAL

## 2019-10-01 DIAGNOSIS — Z86.39 HX OF HYPERPROLACTINEMIA: ICD-10-CM

## 2019-10-01 DIAGNOSIS — Z86.39 HX OF HYPERPROLACTINEMIA: Primary | ICD-10-CM

## 2019-10-01 RX ORDER — MEDROXYPROGESTERONE ACETATE 10 MG/1
10 TABLET ORAL DAILY
Qty: 30 TABLET | Refills: 5 | Status: SHIPPED | OUTPATIENT
Start: 2019-10-01 | End: 2019-12-18 | Stop reason: SDUPTHER

## 2019-10-04 LAB — PROLACTIN: 10.9 NG/ML (ref 2.8–26)

## 2019-10-14 RX ORDER — METFORMIN HYDROCHLORIDE 500 MG/1
TABLET, EXTENDED RELEASE ORAL
Qty: 60 TABLET | Refills: 11 | Status: SHIPPED | OUTPATIENT
Start: 2019-10-14 | End: 2019-12-18 | Stop reason: DRUGHIGH

## 2019-12-18 ENCOUNTER — OFFICE VISIT (OUTPATIENT)
Dept: OBGYN | Age: 34
End: 2019-12-18
Payer: COMMERCIAL

## 2019-12-18 VITALS
SYSTOLIC BLOOD PRESSURE: 119 MMHG | HEART RATE: 90 BPM | HEIGHT: 64 IN | WEIGHT: 139 LBS | BODY MASS INDEX: 23.73 KG/M2 | DIASTOLIC BLOOD PRESSURE: 79 MMHG

## 2019-12-18 DIAGNOSIS — N92.6 IRREGULAR PERIODS: ICD-10-CM

## 2019-12-18 DIAGNOSIS — Z31.69 ENCOUNTER FOR PRECONCEPTION CONSULTATION: Primary | ICD-10-CM

## 2019-12-18 DIAGNOSIS — Z86.39 HX OF HYPERPROLACTINEMIA: ICD-10-CM

## 2019-12-18 PROCEDURE — G8420 CALC BMI NORM PARAMETERS: HCPCS | Performed by: OBSTETRICS & GYNECOLOGY

## 2019-12-18 PROCEDURE — 99213 OFFICE O/P EST LOW 20 MIN: CPT | Performed by: OBSTETRICS & GYNECOLOGY

## 2019-12-18 PROCEDURE — 1036F TOBACCO NON-USER: CPT | Performed by: OBSTETRICS & GYNECOLOGY

## 2019-12-18 PROCEDURE — G8427 DOCREV CUR MEDS BY ELIG CLIN: HCPCS | Performed by: OBSTETRICS & GYNECOLOGY

## 2019-12-18 PROCEDURE — G8484 FLU IMMUNIZE NO ADMIN: HCPCS | Performed by: OBSTETRICS & GYNECOLOGY

## 2019-12-18 RX ORDER — MEDROXYPROGESTERONE ACETATE 10 MG/1
10 TABLET ORAL DAILY
Qty: 90 TABLET | Refills: 5 | Status: SHIPPED | OUTPATIENT
Start: 2019-12-18 | End: 2020-08-13

## 2019-12-18 RX ORDER — METFORMIN HYDROCHLORIDE 500 MG/1
1000 TABLET, EXTENDED RELEASE ORAL 2 TIMES DAILY WITH MEALS
Qty: 60 TABLET | Refills: 11 | Status: SHIPPED | OUTPATIENT
Start: 2019-12-18 | End: 2020-06-17

## 2019-12-18 ASSESSMENT — ENCOUNTER SYMPTOMS
RESPIRATORY NEGATIVE: 1
EYES NEGATIVE: 1
GASTROINTESTINAL NEGATIVE: 1

## 2019-12-31 RX ORDER — LETROZOLE 2.5 MG/1
2.5 TABLET, FILM COATED ORAL DAILY
Qty: 5 TABLET | Refills: 0 | Status: SHIPPED | OUTPATIENT
Start: 2019-12-31 | End: 2020-01-27

## 2020-01-27 RX ORDER — LETROZOLE 2.5 MG/1
2.5 TABLET, FILM COATED ORAL DAILY
Qty: 5 TABLET | Refills: 0 | Status: SHIPPED | OUTPATIENT
Start: 2020-01-27 | End: 2020-02-21

## 2020-02-21 RX ORDER — LETROZOLE 2.5 MG/1
2.5 TABLET, FILM COATED ORAL DAILY
Qty: 5 TABLET | Refills: 0 | Status: SHIPPED | OUTPATIENT
Start: 2020-02-21 | End: 2020-03-23

## 2020-03-09 ENCOUNTER — TELEPHONE (OUTPATIENT)
Dept: OBGYN | Age: 35
End: 2020-03-09

## 2020-03-09 NOTE — TELEPHONE ENCOUNTER
Called and LM for pt instructed to  trigger shot from [facility]. Called in trigger shot to [facility] FACILITY, [facility].

## 2020-03-13 ENCOUNTER — OFFICE VISIT (OUTPATIENT)
Dept: INTERNAL MEDICINE | Age: 35
End: 2020-03-13
Payer: COMMERCIAL

## 2020-03-13 VITALS
HEIGHT: 64 IN | SYSTOLIC BLOOD PRESSURE: 110 MMHG | WEIGHT: 131 LBS | DIASTOLIC BLOOD PRESSURE: 74 MMHG | BODY MASS INDEX: 22.36 KG/M2 | OXYGEN SATURATION: 99 % | HEART RATE: 95 BPM | TEMPERATURE: 98 F

## 2020-03-13 LAB — S PYO AG THROAT QL: NORMAL

## 2020-03-13 PROCEDURE — G8427 DOCREV CUR MEDS BY ELIG CLIN: HCPCS | Performed by: INTERNAL MEDICINE

## 2020-03-13 PROCEDURE — 99213 OFFICE O/P EST LOW 20 MIN: CPT | Performed by: INTERNAL MEDICINE

## 2020-03-13 PROCEDURE — 87880 STREP A ASSAY W/OPTIC: CPT | Performed by: INTERNAL MEDICINE

## 2020-03-13 PROCEDURE — G8484 FLU IMMUNIZE NO ADMIN: HCPCS | Performed by: INTERNAL MEDICINE

## 2020-03-13 PROCEDURE — G8420 CALC BMI NORM PARAMETERS: HCPCS | Performed by: INTERNAL MEDICINE

## 2020-03-13 PROCEDURE — 1036F TOBACCO NON-USER: CPT | Performed by: INTERNAL MEDICINE

## 2020-03-13 RX ORDER — AMOXICILLIN AND CLAVULANATE POTASSIUM 875; 125 MG/1; MG/1
1 TABLET, FILM COATED ORAL 2 TIMES DAILY
Qty: 20 TABLET | Refills: 0 | Status: SHIPPED | OUTPATIENT
Start: 2020-03-13 | End: 2020-03-23

## 2020-03-13 ASSESSMENT — ENCOUNTER SYMPTOMS
CHEST TIGHTNESS: 0
PHOTOPHOBIA: 0
BLOOD IN STOOL: 0
COLOR CHANGE: 0
SINUS PRESSURE: 0
SINUS PAIN: 0
FACIAL SWELLING: 0
WHEEZING: 0
ABDOMINAL DISTENTION: 0
EYE PAIN: 0
ABDOMINAL PAIN: 0
DIARRHEA: 0
EYE ITCHING: 0
COUGH: 0
EYE REDNESS: 0
NAUSEA: 0
BACK PAIN: 0
SHORTNESS OF BREATH: 0
EYE DISCHARGE: 0
SORE THROAT: 1
CONSTIPATION: 0
VOMITING: 0
RHINORRHEA: 0

## 2020-03-13 ASSESSMENT — PATIENT HEALTH QUESTIONNAIRE - PHQ9
SUM OF ALL RESPONSES TO PHQ9 QUESTIONS 1 & 2: 0
1. LITTLE INTEREST OR PLEASURE IN DOING THINGS: 0
SUM OF ALL RESPONSES TO PHQ QUESTIONS 1-9: 0
SUM OF ALL RESPONSES TO PHQ QUESTIONS 1-9: 0
2. FEELING DOWN, DEPRESSED OR HOPELESS: 0

## 2020-03-13 NOTE — PROGRESS NOTES
of allergy    GBS carrier        Review of Systems   Constitutional: Negative for activity change, appetite change, chills, diaphoresis, fatigue, fever and unexpected weight change. HENT: Positive for ear pain and sore throat. Negative for congestion, facial swelling, hearing loss, mouth sores, postnasal drip, rhinorrhea, sinus pressure, sinus pain and tinnitus. Eyes: Negative for photophobia, pain, discharge, redness, itching and visual disturbance. Respiratory: Negative for cough, chest tightness, shortness of breath and wheezing. Cardiovascular: Negative for chest pain, palpitations and leg swelling. Gastrointestinal: Negative for abdominal distention, abdominal pain, blood in stool, constipation, diarrhea, nausea and vomiting. Endocrine: Negative for cold intolerance, heat intolerance, polydipsia, polyphagia and polyuria. Genitourinary: Negative for decreased urine volume, difficulty urinating, dyspareunia, dysuria, enuresis, flank pain, frequency, genital sores, hematuria, menstrual problem, pelvic pain, urgency, vaginal bleeding, vaginal discharge and vaginal pain. Musculoskeletal: Negative for arthralgias, back pain, gait problem, joint swelling, myalgias, neck pain and neck stiffness. Skin: Negative for color change, pallor, rash and wound. Allergic/Immunologic: Positive for environmental allergies. Negative for food allergies and immunocompromised state. Neurological: Negative for dizziness, tremors, seizures, syncope, facial asymmetry, speech difficulty, weakness, light-headedness, numbness and headaches. Hematological: Positive for adenopathy. Does not bruise/bleed easily. Psychiatric/Behavioral: Negative for dysphoric mood. /74 (Site: Left Upper Arm)   Pulse 95   Temp 98 °F (36.7 °C) (Skin)   Ht 5' 4\" (1.626 m)   Wt 131 lb (59.4 kg)   SpO2 99%   BMI 22.49 kg/m²   Physical Exam  Vitals signs and nursing note reviewed.    Constitutional:       General: She is not in acute distress. Appearance: Normal appearance. She is well-developed and normal weight. She is not ill-appearing, toxic-appearing or diaphoretic. HENT:      Head: Normocephalic and atraumatic. Right Ear: Tympanic membrane, ear canal and external ear normal. There is no impacted cerumen. Left Ear: Tympanic membrane, ear canal and external ear normal. There is no impacted cerumen. Nose: Nose normal. No congestion or rhinorrhea. Mouth/Throat:      Mouth: Mucous membranes are moist.      Pharynx: Oropharynx is clear. Posterior oropharyngeal erythema present. No oropharyngeal exudate. Eyes:      General: No scleral icterus. Right eye: No discharge. Left eye: No discharge. Extraocular Movements: Extraocular movements intact. Conjunctiva/sclera: Conjunctivae normal.      Pupils: Pupils are equal, round, and reactive to light. Neck:      Musculoskeletal: Normal range of motion and neck supple. No neck rigidity or muscular tenderness. Thyroid: No thyromegaly. Vascular: No carotid bruit or JVD. Trachea: No tracheal deviation. Cardiovascular:      Rate and Rhythm: Normal rate and regular rhythm. Pulses: Normal pulses. Heart sounds: Normal heart sounds. No murmur. No friction rub. No gallop. Pulmonary:      Effort: Pulmonary effort is normal. No respiratory distress. Breath sounds: Normal breath sounds. No stridor. No wheezing, rhonchi or rales. Chest:      Chest wall: No tenderness. Abdominal:      General: Bowel sounds are normal. There is no distension. Palpations: Abdomen is soft. There is no mass. Tenderness: There is no abdominal tenderness. There is no right CVA tenderness, left CVA tenderness, guarding or rebound. Hernia: No hernia is present. Musculoskeletal: Normal range of motion. General: No swelling, tenderness, deformity or signs of injury. Right lower leg: No edema.       Left lower leg: No edema. Lymphadenopathy:      Cervical: No cervical adenopathy. Skin:     General: Skin is warm and dry. Capillary Refill: Capillary refill takes less than 2 seconds. Coloration: Skin is not jaundiced or pale. Findings: No bruising, erythema, lesion or rash. Neurological:      General: No focal deficit present. Mental Status: She is alert and oriented to person, place, and time. Mental status is at baseline. Cranial Nerves: No cranial nerve deficit. Sensory: No sensory deficit. Motor: No weakness or abnormal muscle tone. Coordination: Coordination normal.      Gait: Gait normal.      Deep Tendon Reflexes: Reflexes normal.   Psychiatric:         Mood and Affect: Mood normal.         Behavior: Behavior normal.         Thought Content: Thought content normal.         Judgment: Judgment normal.         1. Pharyngitis, unspecified etiology        ASSESSMENT/PLAN:    71-year-old woman here for evaluation of pharyngitis    1. Pharyngitis: Strep was negative. Will prescribe Augmentin. Charly Joseph was seen today for pharyngitis. Diagnoses and all orders for this visit:    Pharyngitis, unspecified etiology  -     POCT rapid strep A  -     CBC Auto Differential; Future  -     Comprehensive Metabolic Panel; Future  -     Lipid Panel; Future  -     TSH without Reflex; Future    Other orders  -     amoxicillin-clavulanate (AUGMENTIN) 875-125 MG per tablet;  Take 1 tablet by mouth 2 times daily for 10 days          Return in about 3 months (around 6/13/2020) for physical.     Orders Placed This Encounter   Procedures    CBC Auto Differential     Fast 12 hours     Standing Status:   Future     Standing Expiration Date:   3/13/2021    Comprehensive Metabolic Panel     Fasting 12 hours     Standing Status:   Future     Standing Expiration Date:   3/13/2021    Lipid Panel     Standing Status:   Future     Standing Expiration Date:   3/13/2021     Order Specific Question:   Is

## 2020-03-23 RX ORDER — LETROZOLE 2.5 MG/1
2.5 TABLET, FILM COATED ORAL DAILY
Qty: 5 TABLET | Refills: 0 | Status: SHIPPED | OUTPATIENT
Start: 2020-03-23 | End: 2020-03-23 | Stop reason: CLARIF

## 2020-06-16 RX ORDER — LETROZOLE 2.5 MG/1
5 TABLET, FILM COATED ORAL DAILY
Qty: 10 TABLET | Refills: 0 | Status: SHIPPED | OUTPATIENT
Start: 2020-06-16 | End: 2020-07-16 | Stop reason: SDUPTHER

## 2020-07-16 RX ORDER — LETROZOLE 2.5 MG/1
5 TABLET, FILM COATED ORAL DAILY
Qty: 10 TABLET | Refills: 0 | Status: SHIPPED | OUTPATIENT
Start: 2020-07-16 | End: 2020-08-13 | Stop reason: ALTCHOICE

## 2020-08-13 ENCOUNTER — PATIENT MESSAGE (OUTPATIENT)
Dept: OBGYN | Age: 35
End: 2020-08-13

## 2020-08-13 ENCOUNTER — OFFICE VISIT (OUTPATIENT)
Dept: OBGYN | Age: 35
End: 2020-08-13
Payer: COMMERCIAL

## 2020-08-13 VITALS
HEIGHT: 64 IN | WEIGHT: 131 LBS | SYSTOLIC BLOOD PRESSURE: 116 MMHG | DIASTOLIC BLOOD PRESSURE: 82 MMHG | BODY MASS INDEX: 22.36 KG/M2 | HEART RATE: 84 BPM

## 2020-08-13 DIAGNOSIS — Z36.89 CONFIRM FETAL CARDIAC ACTIVITY USING ULTRASOUND: ICD-10-CM

## 2020-08-13 DIAGNOSIS — N91.2 AMENORRHEA: ICD-10-CM

## 2020-08-13 DIAGNOSIS — Z32.01 POSITIVE PREGNANCY TEST: ICD-10-CM

## 2020-08-13 LAB
CONTROL: ABNORMAL
GONADOTROPIN, CHORIONIC (HCG) QUANT: 91.1 MIU/ML (ref 0–5.3)
PREGNANCY TEST URINE, POC: POSITIVE
PROGESTERONE LEVEL: 24.58 NG/ML

## 2020-08-13 PROCEDURE — 81025 URINE PREGNANCY TEST: CPT | Performed by: ADVANCED PRACTICE MIDWIFE

## 2020-08-13 PROCEDURE — 99395 PREV VISIT EST AGE 18-39: CPT | Performed by: ADVANCED PRACTICE MIDWIFE

## 2020-08-13 ASSESSMENT — ENCOUNTER SYMPTOMS
RESPIRATORY NEGATIVE: 1
GASTROINTESTINAL NEGATIVE: 1
EYES NEGATIVE: 1
ALLERGIC/IMMUNOLOGIC NEGATIVE: 1

## 2020-08-13 NOTE — TELEPHONE ENCOUNTER
From: Richard Serrano  To: Lucie Castleman, APRN - CNM  Sent: 8/13/2020 1:16 PM CDT  Subject: Test Results Question    I have a question about HCG QUANTITATIVE PREGNANCY resulted on 8/13/20 at 11:50 AM.  So, no need for follow up blood test, or should I get blood work again soon?

## 2020-08-13 NOTE — PATIENT INSTRUCTIONS
Patient Education        Breast Self-Exam: Care Instructions  Your Care Instructions     A breast self-exam is when you check your breasts for lumps or changes. This regular exam helps you learn how your breasts normally look and feel. Most breast problems or changes are not because of cancer. Breast self-exam is not a substitute for a mammogram. Having regular breast exams by your doctor and regular mammograms improve your chances of finding any problems with your breasts. Some women set a time each month to do a step-by-step breast self-exam. Other women like a less formal system. They might look at their breasts as they brush their teeth, or feel their breasts once in a while in the shower. If you notice a change in your breast, tell your doctor. Follow-up care is a key part of your treatment and safety. Be sure to make and go to all appointments, and call your doctor if you are having problems. It's also a good idea to know your test results and keep a list of the medicines you take. How do you do a breast self-exam?  · The best time to examine your breasts is usually one week after your menstrual period begins. Your breasts should not be tender then. If you do not have periods, you might do your exam on a day of the month that is easy to remember. · To examine your breasts:  ? Remove all your clothes above the waist and lie down. When you are lying down, your breast tissue spreads evenly over your chest wall, which makes it easier to feel all your breast tissue. ? Use the pads--not the fingertips--of the 3 middle fingers of your left hand to check your right breast. Move your fingers slowly in small coin-sized circles that overlap. ? Use three levels of pressure to feel of all your breast tissue. Use light pressure to feel the tissue close to the skin surface. Use medium pressure to feel a little deeper. Use firm pressure to feel your tissue close to your breastbone and ribs.  Use each pressure level to feel your breast tissue before moving on to the next spot. ? Check your entire breast, moving up and down as if following a strip from the collarbone to the bra line, and from the armpit to the ribs. Repeat until you have covered the entire breast.  ? Repeat this procedure for your left breast, using the pads of the 3 middle fingers of your right hand. · To examine your breasts while in the shower:  ? Place one arm over your head and lightly soap your breast on that side. ? Using the pads of your fingers, gently move your hand over your breast (in the strip pattern described above), feeling carefully for any lumps or changes. ? Repeat for the other breast.  · Have your doctor inspect anything you notice to see if you need further testing. Where can you learn more? Go to https://chpeflorentinoeb.[employer] Media. org and sign in to your [employer] account. Enter P148 in the [place] box to learn more about \"Breast Self-Exam: Care Instructions. \"     If you do not have an account, please click on the \"Sign Up Now\" link. Current as of: August 22, 2019               Content Version: 12.5  © 4875-9341 Healthwise, [employer]. Care instructions adapted under license by [facility] [place] ([facility]). If you have questions about a medical condition or this instruction, always ask your healthcare professional. [address] any warranty or liability for your use of this information. Patient Education        Nutrition During Pregnancy: Care Instructions  Your Care Instructions     Healthy eating when you are pregnant is important for you and your baby. It can help you feel well and have a successful pregnancy and delivery. During pregnancy your nutrition needs increase. Even if you have excellent eating habits, your doctor may recommend a multivitamin to make sure you get enough iron and folic acid. Many pregnant women wonder how much weight they should gain.  In general, women who were at a healthy weight before they became pregnant should gain between 25 and 35 pounds. Women who were overweight before pregnancy are usually advised to gain 15 to 25 pounds. Women who were underweight before pregnancy are usually advised to gain 28 to 40 pounds. Your doctor will work with you to set a weight goal that is right for you. Gaining a healthy amount of weight helps you have a healthy baby. Follow-up care is a key part of your treatment and safety. Be sure to make and go to all appointments, and call your doctor if you are having problems. It's also a good idea to know your test results and keep a list of the medicines you take. How can you care for yourself at home? · Eat plenty of fruits and vegetables. Include a variety of orange, yellow, and leafy dark-green vegetables every day. · Choose whole-grain bread, cereal, and pasta. Good choices include whole wheat bread, whole wheat pasta, brown rice, and oatmeal.  · Get 4 or more servings of milk and milk products each day. Good choices include nonfat or low-fat milk, yogurt, and cheese. If you cannot eat milk products, you can get calcium from calcium-fortified products such as orange juice, soy milk, and tofu. Other non-milk sources of calcium include leafy green vegetables, such as broccoli, kale, mustard greens, turnip greens, bok osman, and brussels sprouts. · If you eat meat, pick lower-fat types. Good choices include lean cuts of meat and chicken or turkey without the skin. · Do not eat shark, swordfish, shabana mackerel, or tilefish. They have high levels of mercury, which is dangerous to your baby. You can eat up to 12 ounces a week of fish or shellfish that have low mercury levels. Good choices include shrimp, wild salmon, pollock, and catfish. Do not eat more than 6 ounces of tuna each week. · Heat lunch meats (such as turkey, ham, or bologna) to 165°F before you eat them.  This reduces your risk of getting sick from a kind of bacteria that can be found in lunch meats. · Do not eat unpasteurized soft cheeses, such as brie, feta, fresh mozzarella, and blue cheese. They have a bacteria that could harm your baby. · Limit caffeine. If you drink coffee or tea, have no more than 1 cup a day. Caffeine is also found in stacy. · Do not drink any alcohol. No amount of alcohol has been found to be safe during pregnancy. · Do not diet or try to lose weight. For example, do not follow a low-carbohydrate diet. If you are overweight at the start of your pregnancy, your doctor will work with you to manage your weight gain. · Tell your doctor about all vitamins and supplements you take. When should you call for help? Watch closely for changes in your health, and be sure to contact your doctor if you have any problems. Where can you learn more? Go to https://Branchpepiceweb.[employer]. org and sign in to your [employer] account. Enter Y785 in the ZÃ¼m XR box to learn more about \"Nutrition During Pregnancy: Care Instructions. \"     If you do not have an account, please click on the \"Sign Up Now\" link. Current as of: February 11, 2020               Content Version: 12.5  © 4064-8975 [employer]. Care instructions adapted under license by [facility] ([facility]). If you have questions about a medical condition or this instruction, always ask your healthcare professional. [place] [address] any warranty or liability for your use of this information. Patient Education        Well Visit, Ages 25 to 48: Care Instructions  Your Care Instructions     Physical exams can help you stay healthy. Your doctor has checked your overall health and may have suggested ways to take good care of yourself. He or she also may have recommended tests. At home, you can help prevent illness with healthy eating, regular exercise, and other steps. Follow-up care is a key part of your treatment and safety.  Be sure to make and go to all appointments, and call your doctor if you are having problems. It's also a good idea to know your test results and keep a list of the medicines you take. How can you care for yourself at home? · Reach and stay at a healthy weight. This will lower your risk for many problems, such as obesity, diabetes, heart disease, and high blood pressure. · Get at least 30 minutes of physical activity on most days of the week. Walking is a good choice. You also may want to do other activities, such as running, swimming, cycling, or playing tennis or team sports. Discuss any changes in your exercise program with your doctor. · Do not smoke or allow others to smoke around you. If you need help quitting, talk to your doctor about stop-smoking programs and medicines. These can increase your chances of quitting for good. · Talk to your doctor about whether you have any risk factors for sexually transmitted infections (STIs). Having one sex partner (who does not have STIs and does not have sex with anyone else) is a good way to avoid these infections. · Use birth control if you do not want to have children at this time. Talk with your doctor about the choices available and what might be best for you. · Protect your skin from too much sun. When you're outdoors from 10 a.m. to 4 p.m., stay in the shade or cover up with clothing and a hat with a wide brim. Wear sunglasses that block UV rays. Even when it's cloudy, put broad-spectrum sunscreen (SPF 30 or higher) on any exposed skin. · See a dentist one or two times a year for checkups and to have your teeth cleaned. · Wear a seat belt in the car. Follow your doctor's advice about when to have certain tests. These tests can spot problems early. For everyone  · Cholesterol. Have the fat (cholesterol) in your blood tested after age 21. Your doctor will tell you how often to have this done based on your age, family history, or other things that can increase your risk for heart disease.   · Blood pressure. Have your blood pressure checked during a routine doctor visit. Your doctor will tell you how often to check your blood pressure based on your age, your blood pressure results, and other factors. · Vision. Talk with your doctor about how often to have a glaucoma test.  · Diabetes. Ask your doctor whether you should have tests for diabetes. · Colon cancer. Your risk for colorectal cancer gets higher as you get older. Some experts say that adults should start regular screening at age 48 and stop at age 76. Others say to start before age 48 or continue after age 76. Talk with your doctor about your risk and when to start and stop screening. For women  · Breast exam and mammogram. Talk to your doctor about when you should have a clinical breast exam and a mammogram. Medical experts differ on whether and how often women under 50 should have these tests. Your doctor can help you decide what is right for you. · Cervical cancer screening test and pelvic exam. Begin with a Pap test at age 24. The test often is part of a pelvic exam. Starting at age 27, you may choose to have a Pap test, an HPV test, or both tests at the same time (called co-testing). Talk with your doctor about how often to have testing. · Tests for sexually transmitted infections (STIs). Ask whether you should have tests for STIs. You may be at risk if you have sex with more than one person, especially if your partners do not wear condoms. For men  · Tests for sexually transmitted infections (STIs). Ask whether you should have tests for STIs. You may be at risk if you have sex with more than one person, especially if you do not wear a condom. · Testicular cancer exam. Ask your doctor whether you should check your testicles regularly. · Prostate exam. Talk to your doctor about whether you should have a blood test (called a PSA test) for prostate cancer.  Experts differ on whether and when men should have this test. Some experts suggest it if

## 2020-08-13 NOTE — PROGRESS NOTES
[facility]  CNM Office Note    Ellie Fuller is a 30 y.o. female who presents today for her medical conditions/ complaints as noted below. Chief Complaint   Patient presents with    Annual Exam     Patient presents today for a pap smear and breast exam.    Confirmation     +HPT         HPI  Zoya Heard presents for annual gyn exam.   Patient Active Problem List   Diagnosis    Biliary dyskinesia    Abdominal pain    Abnormal biliary HIDA scan    Dyspepsia    Elevated liver enzymes    Esophageal reflux    Fatigue    History of allergy    GBS carrier       Patient's last menstrual period was 07/15/2020 (exact date). [id number]    Past Medical History:   Diagnosis Date    Esophageal reflux 10/10/2017    Gallbladder disease     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     Patient tested positive about two weeks ago from today, 09/22/16    Prolactin deficiency [facility])      Past Surgical History:   Procedure Laterality Date    CHOLECYSTECTOMY, LAPAROSCOPIC N/A 10/13/2016    CHOLECYSTECTOMY LAPAROSCOPIC performed by [facility]ilesh [facility] at [facility]      TYMPANOSTOMY TUBE PLACEMENT       Family History   Problem Relation Age of Onset    Hypertension Mother     Diabetes Father     Hypertension Father     Stroke Maternal Grandmother     Heart Disease Maternal Grandfather      Social History     Tobacco Use    Smoking status: Never Smoker    Smokeless tobacco: Never Used   Substance Use Topics    Alcohol use: No       Current Outpatient Medications   Medication Sig Dispense Refill    metFORMIN (GLUCOPHAGE-XR) 500 MG extended release tablet TAKE 2 TABLETS BY MOUTH TWICE A DAY WITH MEALS 360 tablet 0    calcium carbonate (CALCIUM 600) 600 MG TABS tablet Take 1 tablet by mouth daily      Prenatal MV-Min-Fe Fum-FA-DHA (PRENATAL 1 PO) Take by mouth       No current facility-administered medications for this visit.       No Known Allergies  Vitals:    08/13/20 1017   BP: 116/82   Pulse: 84     Body mass index is 22.49 kg/m². Review of Systems   Constitutional: Negative. HENT: Negative. Eyes: Negative. Respiratory: Negative. Cardiovascular: Negative. Gastrointestinal: Negative. Endocrine: Negative. Genitourinary: Negative. Musculoskeletal: Negative. Skin: Negative. Allergic/Immunologic: Negative. Neurological: Negative. Hematological: Negative. Psychiatric/Behavioral: Negative. Physical Exam  Constitutional:       Appearance: She is well-developed. HENT:      Head: Normocephalic and atraumatic. Eyes:      Conjunctiva/sclera: Conjunctivae normal.      Pupils: Pupils are equal, round, and reactive to light. Neck:      Musculoskeletal: Normal range of motion and neck supple. Thyroid: No thyromegaly. Trachea: No tracheal deviation. Cardiovascular:      Rate and Rhythm: Normal rate and regular rhythm. Heart sounds: Normal heart sounds. No murmur. Pulmonary:      Effort: Pulmonary effort is normal. No respiratory distress. Breath sounds: Normal breath sounds. Chest:      Comments: Breasts symmetrical without tenderness, masses, or nipple discharge. Nipples everted bilaterally. Abdominal:      General: There is no distension. Palpations: Abdomen is soft. Tenderness: There is no abdominal tenderness. There is no guarding. Genitourinary:     Vagina: Normal.      Cervix: No cervical motion tenderness, discharge or friability. Adnexa:         Right: No mass, tenderness or fullness. Left: No mass, tenderness or fullness. Rectum: Normal.      Comments: EGBUS normal. Vulva reveals no erythema, lesions or atrophic changes. Vagina normal, no lesions, polyps or discharge. Cervix is normal, smooth pink mucosa. No Lesions, no polyps. Musculoskeletal: Normal range of motion. Skin:     General: Skin is warm and dry. Capillary Refill: Capillary refill takes less than 2 seconds.    Neurological:      Mental Status:

## 2020-08-20 DIAGNOSIS — R79.89 ELEVATED SERUM HCG: ICD-10-CM

## 2020-08-20 LAB — GONADOTROPIN, CHORIONIC (HCG) QUANT: 1408 MIU/ML (ref 0–5.3)

## 2020-09-10 ENCOUNTER — INITIAL PRENATAL (OUTPATIENT)
Dept: OBGYN | Age: 35
End: 2020-09-10

## 2020-09-10 VITALS — WEIGHT: 130 LBS | DIASTOLIC BLOOD PRESSURE: 72 MMHG | BODY MASS INDEX: 22.31 KG/M2 | SYSTOLIC BLOOD PRESSURE: 116 MMHG

## 2020-09-10 DIAGNOSIS — Z34.81 ENCOUNTER FOR SUPERVISION OF OTHER NORMAL PREGNANCY IN FIRST TRIMESTER: ICD-10-CM

## 2020-09-10 PROBLEM — Z22.330 GBS CARRIER: Status: RESOLVED | Noted: 2018-12-07 | Resolved: 2020-09-10

## 2020-09-10 PROBLEM — K21.9 ESOPHAGEAL REFLUX: Status: RESOLVED | Noted: 2017-10-10 | Resolved: 2020-09-10

## 2020-09-10 PROBLEM — Z88.9 HISTORY OF ALLERGY: Status: RESOLVED | Noted: 2017-10-10 | Resolved: 2020-09-10

## 2020-09-10 PROBLEM — R94.8 ABNORMAL BILIARY HIDA SCAN: Status: RESOLVED | Noted: 2017-10-09 | Resolved: 2020-09-10

## 2020-09-10 PROBLEM — R10.9 ABDOMINAL PAIN: Status: RESOLVED | Noted: 2017-10-09 | Resolved: 2020-09-10

## 2020-09-10 PROBLEM — R10.13 DYSPEPSIA: Status: RESOLVED | Noted: 2017-10-10 | Resolved: 2020-09-10

## 2020-09-10 PROBLEM — R74.8 ELEVATED LIVER ENZYMES: Status: RESOLVED | Noted: 2017-10-10 | Resolved: 2020-09-10

## 2020-09-10 PROBLEM — R53.83 FATIGUE: Status: RESOLVED | Noted: 2017-10-10 | Resolved: 2020-09-10

## 2020-09-10 LAB
ABO/RH: NORMAL
ANTIBODY SCREEN: NORMAL
BASOPHILS ABSOLUTE: 0 K/UL (ref 0–0.2)
BASOPHILS RELATIVE PERCENT: 0.6 % (ref 0–1)
EOSINOPHILS ABSOLUTE: 0.1 K/UL (ref 0–0.6)
EOSINOPHILS RELATIVE PERCENT: 2.2 % (ref 0–5)
HCT VFR BLD CALC: 41.9 % (ref 37–47)
HEMOGLOBIN: 14.1 G/DL (ref 12–16)
HEPATITIS B SURFACE ANTIGEN INTERPRETATION: ABNORMAL
HIV-1 P24 AG: ABNORMAL
IMMATURE GRANULOCYTES #: 0 K/UL
LYMPHOCYTES ABSOLUTE: 2.6 K/UL (ref 1.1–4.5)
LYMPHOCYTES RELATIVE PERCENT: 40 % (ref 20–40)
MCH RBC QN AUTO: 27.9 PG (ref 27–31)
MCHC RBC AUTO-ENTMCNC: 33.7 G/DL (ref 33–37)
MCV RBC AUTO: 83 FL (ref 81–99)
MONOCYTES ABSOLUTE: 0.5 K/UL (ref 0–0.9)
MONOCYTES RELATIVE PERCENT: 8.4 % (ref 0–10)
NEUTROPHILS ABSOLUTE: 3.1 K/UL (ref 1.5–7.5)
NEUTROPHILS RELATIVE PERCENT: 48.6 % (ref 50–65)
PDW BLD-RTO: 13.9 % (ref 11.5–14.5)
PLATELET # BLD: 234 K/UL (ref 130–400)
PMV BLD AUTO: 8.8 FL (ref 9.4–12.3)
RAPID HIV 1&2: ABNORMAL
RBC # BLD: 5.05 M/UL (ref 4.2–5.4)
RPR: ABNORMAL
RUBELLA ANTIBODY IGG: REACTIVE
WBC # BLD: 6.4 K/UL (ref 4.8–10.8)

## 2020-09-10 PROCEDURE — 0502F SUBSEQUENT PRENATAL CARE: CPT | Performed by: ADVANCED PRACTICE MIDWIFE

## 2020-09-10 NOTE — PATIENT INSTRUCTIONS
folate. ? Dairy products, tofu, canned fish with bones, almonds, broccoli, dark leafy greens, corn tortillas, and fortified orange juice are good sources of calcium. ? Beef, poultry, liver, spinach, lentils, dried beans, fortified cereals, and dried fruits are rich in iron. ? Dark leafy greens, broccoli, asparagus, liver, fortified cereals, orange juice, peanuts, and almonds are good sources of folate. · Avoid foods that could harm your baby. ? Do not eat raw or undercooked meat, chicken, or fish (such as sushi or raw oysters). ? Do not eat raw eggs or foods that contain raw eggs, such as Caesar dressing. ? Do not eat soft cheeses and unpasteurized dairy foods, such as Brie, feta, or blue cheese. ? Do not eat fish that contains a lot of mercury, such as shark, swordfish, tilefish, or shabana mackerel. Do not eat more than 6 ounces of tuna each week. ? Do not eat raw sprouts, especially alfalfa sprouts. ? Cut down on caffeine, such as coffee, tea, and cola. Protect yourself and your baby  · Do not touch roger litter or cat feces. They can cause an infection that could harm your baby. · High body temperature can be harmful to your baby. So if you want to use a sauna or hot tub, be sure to talk to your doctor about how to use it safely. [place] with morning sickness  · Sip small amounts of water, juices, or shakes. Try drinking between meals, not with meals. · Eat 5 or 6 small meals a day. Try dry toast or crackers when you first get up, and eat breakfast a little later. · Avoid spicy, greasy, and fatty foods. · When you feel sick, open your windows or go for a short walk to get fresh air. · Try nausea wristbands. These help some women. · Tell your doctor if you think your prenatal vitamins make you sick. Where can you learn more? Go to https://alba.healthDafiti. org and sign in to your [employer] account.  Enter G112 in the [employer] box to learn more about \"Weeks 6 to 10 of Your Learning About When to Call Your Doctor During Pregnancy (Up to 20 Weeks)  Your Care Instructions     It's common to have concerns about what might be a problem during pregnancy. Although most pregnant women don't have any serious problems, it's important to know when to call your doctor if you have certain symptoms. These are general suggestions. Your doctor may give you some more information about when to call. When to call your doctor (up to 20 weeks)  [id number] anytime you think you may need emergency care. For example, call if:  · You passed out (lost consciousness). Call your doctor now or seek immediate medical care if:  · You have a fever. · You have vaginal bleeding. · You are dizzy or lightheaded, or you feel like you may faint. · You have symptoms of a urinary tract infection. These may include:  ? Pain or burning when you urinate. ? A frequent need to urinate without being able to pass much urine. ? Pain in the flank, which is just below the rib cage and above the waist on either side of the back. ? Blood in your urine. · You have belly pain. · You think you are having contractions. · You have a sudden release of fluid from your vagina. Watch closely for changes in your health, and be sure to contact your doctor if:  · You have vaginal discharge that smells bad. · You have other concerns about your pregnancy. Follow-up care is a key part of your treatment and safety. Be sure to make and go to all appointments, and call your doctor if you are having problems. It's also a good idea to know your test results and keep a list of the medicines you take. Where can you learn more? Go to https://alba.[employer]. org and sign in to your [employer] account. Enter B541 in the [facility] box to learn more about \"Learning About When to Call Your Doctor During Pregnancy (Up to 20 Weeks). \"     If you do not have an account, please click on the \"Sign Up Now\" link.   Current as problems. It's also a good idea to know your test results and keep a list of the medicines you take. How can you care for yourself at home? · Before planning a trip, ask your doctor if it is safe for you to fly. Some airlines ask to see a note from your doctor with your due date. Choose an aisle seat if possible. This will make it easier to move around in the plane. · Always wear your seat belt when you travel in a car, plane, or other vehicle that has seat belts. Strap the lower belt across your lower lap/upper thighs. Place the shoulder belt between your breasts and up over your shoulder, not over your belly. Remove any excess slack in the seat belt. · During long trips, take time to walk around at least every 2 hours to keep blood from settling in your legs. Being pregnant increases your chances of getting dangerous blood clots in your legs when you sit still for long periods of time. Walking helps prevent this problem. Also drink plenty of water to prevent dehydration. · If you are sitting in front of an air bag, slide the seat as far back as you can. Tilt the seat back slightly to increase the distance between your chest and the air bag. · If you get motion sickness, make sure the medicine you use is safe for pregnant women. · If you plan to travel overseas, call the [place] Centers for Disease Control and Prevention (1-783.283.9664) to get information on diseases in the area you will travel to. You can also check its website (wwwnc.cdc.gov/travel/default. aspx). · You should avoid travel to places where malaria is common. Malaria can cause serious illness in you and your unborn baby and cannot always be prevented. · If you are not sure the tap water is safe, drink bottled water or soft drinks. Do not eat salads, and do not eat raw vegetables unless they are peeled. Where can you learn more? Go to https://alba.health-partners. org and sign in to your [employer] account.  Enter B824 in the [address] Information box to learn more about \"Travel During Pregnancy: Care Instructions. \"     If you do not have an account, please click on the \"Sign Up Now\" link. Current as of: February 11, 2020               Content Version: 12.5  © 7140-8323 Healthwise, Incorporated. Care instructions adapted under license by [facility] ([facility]). If you have questions about a medical condition or this instruction, always ask your healthcare professional. [address] any warranty or liability for your use of this information.

## 2020-09-10 NOTE — PROGRESS NOTES
Plan of care was discussed with patient. Patient was encouraged to adhere to a well-balanced diet, including increasing water intake and limiting excessive caffeine and salt. The benefits of exercise were discussed; however she was advised against heavy lifting, sit-ups and abdominal crunches. A list of safe OTC medications was provided and discussed. The patient was cautioned against the use of tanning beds, hot tubs, saunas, and x-rays. Avoidance of tobacco, alcohol and illicit drugs was also discussed due to harmful effects on the fetus and increased risks associated with pregnancy. Certain labs and ultrasounds are required at certain times during pregnancy but others are optional, including the serum integrated screen/[place]/AFP/ Panorama, and other genetic testing. The patient was encouraged to attend childbirth classes and general hospital information was provided based on patients hospital of choice.

## 2020-09-11 RX ORDER — METFORMIN HYDROCHLORIDE 500 MG/1
TABLET, EXTENDED RELEASE ORAL
Qty: 360 TABLET | Refills: 0 | Status: SHIPPED | OUTPATIENT
Start: 2020-09-11 | End: 2020-12-18 | Stop reason: SDUPTHER

## 2020-09-12 LAB — URINE CULTURE, ROUTINE: NORMAL

## 2020-09-14 LAB
CHLAMYDIA TRACHOMATIS AMPLIFIED DET: NEGATIVE
N GONORRHOEAE AMPLIFIED DET: NEGATIVE
SPECIMEN SOURCE: NORMAL
VZV IGG SER QL IA: 3.05

## 2020-09-17 LAB
HERPES TYPE 1/2 IGM COMBINED: 1.49 IV
HERPES TYPE I/II IGG COMBINED: 0.86 IV

## 2020-10-13 ENCOUNTER — ROUTINE PRENATAL (OUTPATIENT)
Dept: OBGYN | Age: 35
End: 2020-10-13

## 2020-10-13 VITALS
DIASTOLIC BLOOD PRESSURE: 68 MMHG | WEIGHT: 131 LBS | BODY MASS INDEX: 22.49 KG/M2 | HEART RATE: 74 BPM | SYSTOLIC BLOOD PRESSURE: 106 MMHG

## 2020-10-13 PROCEDURE — 0502F SUBSEQUENT PRENATAL CARE: CPT | Performed by: OBSTETRICS & GYNECOLOGY

## 2020-10-13 NOTE — PROGRESS NOTES
Pt presents today for a routine prenatal visit. Pt denies vaginal bleeding, leaking of fluid or cramping. Patient declines genetic testing and patient received flu injection at her work.

## 2020-10-13 NOTE — PATIENT INSTRUCTIONS
Patient Education        Weeks 10 to 14 of Your Pregnancy: Care Instructions  Your Care Instructions     By weeks 10 to 14 of your pregnancy, the placenta has formed inside your uterus. It is possible to hear your baby's heartbeat with a special ultrasound device. Your baby's eyes can and do move. The arms and legs can bend. This is a good time to think about testing for birth defects. There are two types of tests: screening and diagnostic. Screening tests show the chance that a baby has a certain birth defect. They can't tell you for sure that your baby has a problem. Diagnostic tests show if a baby has a certain birth defect. It's your choice whether to have these tests. You and your partner can talk to your doctor or midwife about birth defects tests. Follow-up care is a key part of your treatment and safety. Be sure to make and go to all appointments, and call your doctor if you are having problems. It's also a good idea to know your test results and keep a list of the medicines you take. How can you care for yourself at home? Decide about tests  · You can have screening tests and diagnostic tests to check for birth defects. The decision to have a test for birth defects is personal. Think about your age, your chance of passing on a family disease, your need to know about any problems, and what you might do after you have the test results. ? Triple or quadruple (quad) blood tests. These screening tests can be done between 15 and 20 weeks of pregnancy. They check the amounts of three or four substances in your blood. The doctor looks at these test results, along with your age and other factors, to find out the chance that your baby may have certain problems. ? Amniocentesis. This diagnostic test is used to look for chromosomal problems in the baby's cells.  It can be done between 15 and 20 weeks of pregnancy, usually around week 16.  ? Nuchal translucency test. This test uses ultrasound to measure the thickness of the area at the back of the baby's neck. An increase in the thickness can be an early sign of Down syndrome. ? Chorionic villus sampling (CVS). This is a test that looks for certain genetic problems with your baby. The same genes that are in your baby are in the placenta. A small piece of the placenta is taken out and tested. This test is done when you are 10 to 13 weeks pregnant. Ease discomfort  · Slow down and take naps when you feel tired. · If your emotions swing, talk to someone. Crying, anxiety, and concentration problems are common. · If your gums bleed, try a softer toothbrush. If your gums are puffy and bleed a lot, see your dentist.  · If you feel dizzy:  ? Get up slowly after sitting or lying down. ? Drink plenty of fluids. ? Eat small snacks to keep your blood sugar stable. ? Put your head between your legs as though you were tying your shoelaces. ? Lie down with your legs higher than your head. Use pillows to prop up your feet. · If you have a headache:  ? Lie down. ? Ask your partner or a good friend for a neck massage. ? Try cool cloths over your forehead or across the back of your neck. ? Use acetaminophen (Tylenol) for pain relief. Do not use nonsteroidal anti-inflammatory drugs (NSAIDs), such as ibuprofen (Advil, Motrin) or naproxen (Aleve), unless your doctor says it is okay. · If you have a nosebleed, pinch your nose gently, and hold it for a short while. To prevent nosebleeds, try massaging a small dab of petroleum jelly, such as Vaseline, in your nostrils. · If your nose is stuffed up, try saline (saltwater) nose sprays. Do not use decongestant sprays. Care for your breasts  · Wear a bra that gives you good support. · Know that changes in your breasts are normal.  ? Your breasts may get larger and more tender. Tenderness usually gets better by 12 weeks. ? Your nipples may get darker and larger, and small bumps around your nipples may show more. ?  The veins in your chest and breasts may show more. · Don't worry about \"toughening'\" your nipples. Breastfeeding will naturally do this. Where can you learn more? Go to https://RoadstruckpeWordStreameb.[employer]. org and sign in to your [employer] account. Enter D707 in the [facility] box to learn more about \"Weeks 10 to 14 of Your Pregnancy: Care Instructions. \"     If you do not have an account, please click on the \"Sign Up Now\" link. Current as of: February 11, 2020               Content Version: 12.6  © 7794-0447 [employer]Comm, Incorporated. Care instructions adapted under license by [facility] ([facility]). If you have questions about a medical condition or this instruction, always ask your healthcare professional. [address] any warranty or liability for your use of this information.

## 2020-10-13 NOTE — PROGRESS NOTES
I, Gale Borjas RN, am scribing for and in the presence of Dr. Manjula Esparza 10/13/2020/10:12 AM/sign      Subjective:  Ritu Ye is here for a return obstetrical visit. Today she is 12w6d weeks EGA. She is doing well and has no complaints. She  does not have vaginal bleeding, n/v, dizziness, or cramping. Objective: Mother's Prenatal Vitals  BP: 106/68  Weight: 131 lb (59.4 kg)  Pulse: 74  Patient Position: Sitting  Alb/Glu  Albumin: Negative  Glucose: Negative  Prenatal Fetal Information  Fetal Heart Rate: 163/US  Movement: Absent  Pt is A&Ox3, in no acute distress. Normocephalic, atraumatic. PERRL. Resp even and non-labored. Skin pink, warm & dry. Gravid abdomen. VELEZ's well. Gait steady. Assessment:    IUP at 12w6d wks      Diagnosis Orders   1. 12 weeks gestation of pregnancy       Plan:  Pt counseled on SAB and genetic testing and carrier screening. Continue with routine prenatal care. RTC in 4 wks for prenatal visit with gender u/s  MEDICATIONS:  No orders of the defined types were placed in this encounter. ORDERS:  No orders of the defined types were placed in this encounter. Susan Astorga MD, personally performed services described in this document as scribed by Melba Mota RN in my presence, and it is both accurate and complete.

## 2020-11-10 ENCOUNTER — ROUTINE PRENATAL (OUTPATIENT)
Dept: OBGYN | Age: 35
End: 2020-11-10

## 2020-11-10 VITALS
BODY MASS INDEX: 23 KG/M2 | HEART RATE: 80 BPM | DIASTOLIC BLOOD PRESSURE: 67 MMHG | WEIGHT: 134 LBS | SYSTOLIC BLOOD PRESSURE: 98 MMHG

## 2020-11-10 PROCEDURE — 0502F SUBSEQUENT PRENATAL CARE: CPT | Performed by: OBSTETRICS & GYNECOLOGY

## 2020-11-10 NOTE — PROGRESS NOTES
Pt is here for routine parental care. Pt denies vaginal bleeding, cramping or leaking of fluid. Pt has already had flu vaccine at another facility.

## 2020-11-10 NOTE — PROGRESS NOTES
I, Gale Borjas RN, am scribing for and in the presence of Dr. Jamie Beth 11/10/2020/11:40 AM/sign      Subjective:  Jaz Marin is here for a return obstetrical visit. Today she is 16w6d weeks EGA. She is doing well, taking her PNV as directed, and has no complaints. She  does not have vaginal bleeding, leaking , contractions, syncope, or headaches. Gender u/s done today. Objective: Mother's Prenatal Vitals  BP: 98/67  Weight: 134 lb (60.8 kg)  Pulse: 80  Patient Position: Sitting  Alb/Glu  Albumin: Negative  Glucose: Negative  Prenatal Fetal Information  Fetal Heart Rate: 157/US  Movement: Absent  Pt is A&Ox3, in no acute distress. Normocephalic, atraumatic. PERRL. Resp even and non-labored. Skin pink, warm & dry. Gravid abdomen. VELEZ's well. Gait steady. Assessment:    IUP at 16w6d wks      Diagnosis Orders   1. 16 weeks gestation of pregnancy       Plan:  Pt counseled on balanced nutrition, adequate fluid intake, taking PNV daily, and exercise along with GHTN precautions  Continue with routine prenatal care. Pt will schedule anatomy scan with Dr. Tobias Pratt  RTC in 4 wks for prenatal visit    MEDICATIONS:  No orders of the defined types were placed in this encounter. ORDERS:  No orders of the defined types were placed in this encounter. Flavia Funk MD, personally performed services described in this document as scribed by Robin Badillo RN in my presence, and it is both accurate and complete.

## 2020-11-10 NOTE — PATIENT INSTRUCTIONS
Patient Education        Weeks 14 to 25 of Your Pregnancy: Care Instructions  Your Care Instructions     During this time, you may start to \"show,\" so that you look pregnant to people around you. You may also notice some changes in your skin, such as itchy spots on your palms or acne on your face. Your baby is now able to pass urine, and your baby's first stool (meconium) is starting to collect in his or her intestines. Hair is also beginning to grow on your baby's head. At your next visit, between weeks 18 and 20, your doctor may do an ultrasound test. The test allows your doctor to check for certain problems. Your doctor can also tell the sex of your baby. This is a good time to think about whether you want to know whether your baby is a boy or a girl. Talk to your doctor about getting a flu shot to help keep you healthy during your pregnancy. As your pregnancy moves along, it is common to worry or feel anxious. Your body is changing a lot. And you are thinking about giving birth, the health of your baby, and becoming a parent. You can learn to cope with any anxiety and stress you feel. Follow-up care is a key part of your treatment and safety. Be sure to make and go to all appointments, and call your doctor if you are having problems. It's also a good idea to know your test results and keep a list of the medicines you take. How can you care for yourself at home? Reduce stress    · Ask for help with cooking and housekeeping.     · Figure out who or what causes your stress. Avoid these people or situations as much as possible.     · Relax every day. Taking 10- to 15-minute breaks can make a big difference. Take a walk, listen to music, or take a warm bath.     · Learn relaxation techniques at prenatal or yoga class. Or buy a relaxation tape.     · List your fears about having a baby and becoming a parent. Share the list with someone you trust. Decide which worries are really small, and try to let them go. Healthwise, Incorporated. Care instructions adapted under license by [facility] ([facility]). If you have questions about a medical condition or this instruction, always ask your healthcare professional. [address] any warranty or liability for your use of this information.

## 2020-11-10 NOTE — PROGRESS NOTES
Pt presents today for a routine prenatal visit. Pt denies vaginal bleeding, leaking of fluid or cramping.  Gender U/S done today (GIRL)

## 2020-12-08 ENCOUNTER — TELEMEDICINE (OUTPATIENT)
Dept: OBGYN | Age: 35
End: 2020-12-08

## 2020-12-08 PROCEDURE — 0502F SUBSEQUENT PRENATAL CARE: CPT | Performed by: OBSTETRICS & GYNECOLOGY

## 2020-12-08 NOTE — PROGRESS NOTES
I, Gale Borjas RN, am scribing for and in the presence of Dr. Beckie Vargas 2020/3:22 PM/sign      Subjective:  Sosa Iglesias is here for a return obstetrical visit. Today she is 20w6d weeks EGA. She is doing well, taking her PNV as directed, and has no complaints. She  does not have vaginal bleeding, leaking , contractions, syncope, or headaches. Pt does feel fetal movement regularly. Pt had anatomy scan last week at Dr. Paul Oropeza. Objective:     Pt is A&Ox3, in no acute distress. Normocephalic, atraumatic. PERRL. Resp even and non-labored. Skin pink, warm & dry. Gravid abdomen. VELEZ's well. Gait steady. Assessment:    IUP at 20w6d wks      Diagnosis Orders   1. 20 weeks gestation of pregnancy       Plan:  Pt counseled on balanced nutrition, adequate fluid intake, taking PNV daily, and exercise along with GHTN precautions and  labor   Reviewed normal anatomy scan with pt  Continue with routine prenatal care. RTC in 4 wks for prenatal visit VV    MEDICATIONS:  No orders of the defined types were placed in this encounter. ORDERS:  No orders of the defined types were placed in this encounter. Erlin Weldon MD, personally performed services described in this document as scribed by Edgar Claudio RN in my presence, and it is both accurate and complete.

## 2020-12-18 RX ORDER — METFORMIN HYDROCHLORIDE 500 MG/1
500 TABLET, EXTENDED RELEASE ORAL 2 TIMES DAILY
Qty: 360 TABLET | Refills: 3 | Status: SHIPPED | OUTPATIENT
Start: 2020-12-18 | End: 2020-12-21

## 2020-12-21 RX ORDER — METFORMIN HYDROCHLORIDE 500 MG/1
1000 TABLET, EXTENDED RELEASE ORAL 2 TIMES DAILY
Qty: 360 TABLET | Refills: 3 | Status: ON HOLD | OUTPATIENT
Start: 2020-12-21 | End: 2021-05-06 | Stop reason: HOSPADM

## 2021-01-06 ENCOUNTER — TELEMEDICINE (OUTPATIENT)
Dept: OBGYN CLINIC | Age: 36
End: 2021-01-06

## 2021-01-06 DIAGNOSIS — Z34.90 PRENATAL CARE, ANTEPARTUM: Primary | ICD-10-CM

## 2021-01-06 DIAGNOSIS — Z3A.25 25 WEEKS GESTATION OF PREGNANCY: ICD-10-CM

## 2021-01-06 PROCEDURE — 0502F SUBSEQUENT PRENATAL CARE: CPT | Performed by: OBSTETRICS & GYNECOLOGY

## 2021-01-06 NOTE — PROGRESS NOTES
I, Gale Borjas RN, am scribing for and in the presence of Dr. Vitaliy Mercedes 1/6/2021/4:38 PM/sign        TELEHEALTH EVALUATION -- Audio/Visual (During MGIZE-96 public health emergency)    Toibas Riggs is a 37 y.o. female who presents today for her medical conditions/ complaints as noted below. Chief Complaint   Patient presents with    Routine Prenatal Visit       Subjective:  Tobias Riggs signed on for VV/return obstetrical visit. Today she is 25w0d weeks EGA. She is doing well, taking her PNV as directed, and has no complaints. She  does not have vaginal bleeding, n/v, syncope, or headaches. Pt does feel fetal movement regularly. Patient Active Problem List   Diagnosis    Biliary dyskinesia       Patient's last menstrual period was 07/15/2020 (exact date).   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     Patient tested positive about two weeks ago from today, 09/22/16    Postpartum depression     undiagnosed    Prolactin deficiency [facility])      Past Surgical History:   Procedure Laterality Date    CHOLECYSTECTOMY, LAPAROSCOPIC N/A 10/13/2016    CHOLECYSTECTOMY LAPAROSCOPIC performed by Vaughn Wallace MD at [facility] 69 TYMPANOSTOMY TUBE PLACEMENT       Family History   Problem Relation Age of Onset    Hypertension Mother     Diabetes Father     Hypertension Father     Alzheimer's Disease Maternal Grandmother     Diabetes Maternal Grandmother     Heart Disease Maternal Grandfather      Social History     Tobacco Use    Smoking status: Never Smoker    Smokeless tobacco: Never Used   Substance Use Topics    Alcohol use: No       Current Outpatient Medications   Medication Sig Dispense Refill    metFORMIN (GLUCOPHAGE-XR) 500 MG extended release tablet Take 2 tablets by mouth 2 times daily 360 tablet 3    calcium carbonate (CALCIUM 600) 600 MG TABS tablet Take 1 tablet by mouth daily  Prenatal MV-Min-Fe Fum-FA-DHA (PRENATAL 1 PO) Take by mouth       No current facility-administered medications for this visit. No Known Allergies  There were no vitals filed for this visit. There is no height or weight on file to calculate BMI. Due to this being a TeleHealth encounter, evaluation of the following organ systems is limited: Vitals/Constitutional/EENT/Resp/CV/GI//MS/Neuro/Skin/Heme-Lymph-Imm. Objective:     Pt is A&Ox3, in no acute distress. Normocephalic, atraumatic. PERRL. Resp even and non-labored. Skin pink, warm & dry. Gravid abdomen. VELEZ's well. Gait steady. MEDICATIONS:  No orders of the defined types were placed in this encounter. ORDERS:  Orders Placed This Encounter   Procedures    Glucose 1 Hour Postprandial    CBC       PLAN:  IUP at 25w0d wks      Diagnosis Orders   1. 25 weeks gestation of pregnancy     2. Prenatal care, antepartum  Glucose 1 Hour Postprandial    CBC      Pt counseled on balanced nutrition, adequate fluid intake, taking PNV daily, and exercise along with GHTN precautions, Kick count and  labor   Continue with routine prenatal care.  RTC in 3 wks for prenatal visit with GCT   All questions answered regarding labor process. Patient identification was verified at the start of the visit: Yes    Total time spent on this encounter: 10 mins     Pursuant to the emergency declaration under the [facility], 1135 waiver authority and the [employer] and [employer] General Act, this Virtual  Visit was conducted, with patient's consent, to reduce the patient's risk of exposure to COVID-19 and provide continuity of care for an established patient. Matthias Roqnuillo MD, personally performed services described in this document as scribed by Renea Pérez RN in my presence, and it is both accurate and complete.        Services were provided through a video synchronous discussion virtually to substitute for in-person clinic visit. detailed exam

## 2021-01-06 NOTE — PATIENT INSTRUCTIONS
Patient Education        Weeks 22 to 26 of Your Pregnancy: Care Instructions  Your Care Instructions     As you enter your 7th month of pregnancy at week 26, your baby's lungs are growing stronger and getting ready to breathe. You may notice that your baby responds to the sound of your or your partner's voice. You may also notice that your baby does less turning and twisting and more squirming or jerking. Jerking often means that your baby has the hiccups. Hiccups are perfectly normal and are only temporary. You may want to think about attending a childbirth preparation class. This is also a good time to start thinking about whether you want to have pain medicine during labor. Most pregnant women are tested for gestational diabetes between weeks 25 and 28. Gestational diabetes occurs when your blood sugar level gets too high when you're pregnant. The test is important, because you can have gestational diabetes and not know it. But the condition can cause problems for your baby. Follow-up care is a key part of your treatment and safety. Be sure to make and go to all appointments, and call your doctor if you are having problems. It's also a good idea to know your test results and keep a list of the medicines you take. How can you care for yourself at home? Ease discomfort from your baby's kicking  · Change your position. Sometimes this will cause your baby to change position too. · Take a deep breath while you raise your arm over your head. Then breathe out while you drop your arm. Do Kegel exercises to prevent urine from leaking  · You can do Kegel exercises while you stand or sit. ? Squeeze the same muscles you would use to stop your urine. Your belly and thighs should not move. ? Hold the squeeze for 3 seconds, and then relax for 3 seconds. ? Start with 3 seconds. Then add 1 second each week until you are able to squeeze for 10 seconds. ? Repeat the exercise 10 to 15 times for each session.  Do three or

## 2021-02-05 ENCOUNTER — ROUTINE PRENATAL (OUTPATIENT)
Dept: OBGYN CLINIC | Age: 36
End: 2021-02-05

## 2021-02-05 VITALS
BODY MASS INDEX: 24.72 KG/M2 | HEART RATE: 97 BPM | DIASTOLIC BLOOD PRESSURE: 74 MMHG | WEIGHT: 144 LBS | SYSTOLIC BLOOD PRESSURE: 115 MMHG

## 2021-02-05 DIAGNOSIS — Z34.90 PRENATAL CARE, ANTEPARTUM: ICD-10-CM

## 2021-02-05 DIAGNOSIS — Z3A.29 29 WEEKS GESTATION OF PREGNANCY: Primary | ICD-10-CM

## 2021-02-05 LAB
GLUCOSE, 1HR PP: 136 MG/DL (ref 75–140)
HCT VFR BLD CALC: 36.6 % (ref 37–47)
HEMOGLOBIN: 11.9 G/DL (ref 12–16)
MCH RBC QN AUTO: 27.3 PG (ref 27–31)
MCHC RBC AUTO-ENTMCNC: 32.5 G/DL (ref 33–37)
MCV RBC AUTO: 83.9 FL (ref 81–99)
PDW BLD-RTO: 13.4 % (ref 11.5–14.5)
PLATELET # BLD: 179 K/UL (ref 130–400)
PMV BLD AUTO: 8.9 FL (ref 9.4–12.3)
RBC # BLD: 4.36 M/UL (ref 4.2–5.4)
WBC # BLD: 8.8 K/UL (ref 4.8–10.8)

## 2021-02-05 PROCEDURE — 0502F SUBSEQUENT PRENATAL CARE: CPT | Performed by: OBSTETRICS & GYNECOLOGY

## 2021-02-05 NOTE — PATIENT INSTRUCTIONS
Patient Education        Weeks 26 to 30 of Your Pregnancy: Care Instructions  Your Care Instructions     You are now in your last trimester of pregnancy. Your baby is growing rapidly. And you'll probably feel your baby moving around more often. Your doctor may ask you to count your baby's kicks. Your back may ache as your body gets used to your baby's size and length. If you haven't already had the Tdap shot during this pregnancy, talk to your doctor about getting it. It will help protect your  against pertussis infection. During this time, it's important to take care of yourself and pay attention to what your body needs. If you feel sexual, explore ways to be close with your partner that match your comfort and desire. Use the tips provided in this care sheet to find ways to be sexual in your own way. Follow-up care is a key part of your treatment and safety. Be sure to make and go to all appointments, and call your doctor if you are having problems. It's also a good idea to know your test results and keep a list of the medicines you take. How can you care for yourself at home? Take it easy at work  · Take frequent breaks. If possible, stop working when you are tired, and rest during your lunch hour. · Take bathroom breaks every 2 hours. · Change positions often. If you sit for long periods, stand up and walk around. · When you stand for a long time, keep one foot on a low stool with your knee bent. After standing a lot, sit with your feet up. · Avoid fumes, chemicals, and tobacco smoke. Be sexual in your own way  · Having sex during pregnancy is okay, unless your doctor tells you not to. · You may be very interested in sex, or you may have no interest at all. · Your growing belly can make it hard to find a good position during intercourse. [place] and explore. · You may get cramps in your uterus when your partner touches your breasts.   · A back rub may relieve the backache or cramps that [employer]. Care instructions adapted under license by [facility] ([facility]). If you have questions about a medical condition or this instruction, always ask your healthcare professional. [address] any warranty or liability for your use of this information.

## 2021-02-05 NOTE — PROGRESS NOTES
Pt presents today for a routine prenatal visit. Pt denies vaginal bleeding, leaking of fluid or cramping. Pt states + fetal movement. 1hr GTT and 3D U/S done today. Discuss Tdap with patient.

## 2021-02-05 NOTE — PROGRESS NOTES
I, Gale Borjas RN, am scribing for and in the presence of Dr. Mary Ordoñez :39 AM/sign      Subjective:  Georgia Ch is here for a return obstetrical visit. Today she is 29w2d weeks EGA. She is doing well, taking her PNV as directed, and has no complaints. She  does not have vaginal bleeding, leaking, contractions, syncope, or headaches. Pt does feel fetal movement regularly. GCT done today  Objective: Mother's Prenatal Vitals  BP: 115/74  Weight: 144 lb (65.3 kg)  Pulse: 97  Patient Position: Sitting  Alb/Glu  Albumin: Negative  Glucose: Negative  Prenatal Fetal Information  Fetal Heart Rate: 150/US  Movement: Present  Pt is A&Ox3, in no acute distress. Normocephalic, atraumatic. PERRL. Resp even and non-labored. Skin pink, warm & dry. Gravid abdomen. VELEZ's well. Gait steady. Assessment:    IUP at 29w2d wks      Diagnosis Orders   1. 29 weeks gestation of pregnancy       Plan:  Pt counseled on balanced nutrition, adequate fluid intake, taking PNV daily, and exercise along with GHTN precautions, Kick count and  labor  Continue with routine prenatal care. Discussed T dap and pt will receive next visit.  surveillance not indicated  RTC in 2 wks for prenatal visit    MEDICATIONS:  No orders of the defined types were placed in this encounter. ORDERS:  No orders of the defined types were placed in this encounter. Gamaliel Easley MD, personally performed services described in this document as scribed by Robb Wills RN in my presence, and it is both accurate and complete.

## 2021-02-19 ENCOUNTER — TELEMEDICINE (OUTPATIENT)
Dept: OBGYN CLINIC | Age: 36
End: 2021-02-19

## 2021-02-19 DIAGNOSIS — Z34.90 PRENATAL CARE, ANTEPARTUM: Primary | ICD-10-CM

## 2021-02-19 PROCEDURE — 0502F SUBSEQUENT PRENATAL CARE: CPT | Performed by: OBSTETRICS & GYNECOLOGY

## 2021-02-19 NOTE — PROGRESS NOTES
I, Gale Borjas RN, am scribing for and in the presence of Dr. April Mendez 2/19/2021/12:01 PM/sign        TELEHEALTH EVALUATION -- Audio/Visual (During [id number] public health emergency)    Yolanda Park is a 37 y.o. female who presents today for her medical conditions/ complaints as noted below. Chief Complaint   Patient presents with    Routine Prenatal Visit       Subjective:  Yolanda Park signed on for VV/return obstetrical visit. Today she is 31w2d weeks EGA. She is doing well, taking her PNV as directed, and has no complaints. She  does not have vaginal bleeding, n/v, syncope, or headaches. Pt does feel fetal movement regularly. Patient Active Problem List   Diagnosis    Biliary dyskinesia       Patient's last menstrual period was 07/15/2020 (exact date).   [id number]    Past Medical History:   Diagnosis Date    Esophageal reflux 10/10/2017    Gallbladder disease     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     Patient tested positive about two weeks ago from today, 09/22/16    Postpartum depression     undiagnosed    Prolactin deficiency [facility])      Past Surgical History:   Procedure Laterality Date    CHOLECYSTECTOMY, LAPAROSCOPIC N/A 10/13/2016    CHOLECYSTECTOMY LAPAROSCOPIC performed by Humphrey Rodriguez [facility] 69 TYMPANOSTOMY TUBE PLACEMENT       Family History   Problem Relation Age of Onset    Hypertension Mother     Diabetes Father     Hypertension Father     Alzheimer's Disease Maternal Grandmother     Diabetes Maternal Grandmother     Heart Disease Maternal Grandfather      Social History     Tobacco Use    Smoking status: Never Smoker    Smokeless tobacco: Never Used   Substance Use Topics    Alcohol use: No       Current Outpatient Medications   Medication Sig Dispense Refill    metFORMIN (GLUCOPHAGE-XR) 500 MG extended release tablet Take 2 tablets by mouth 2 times daily 360 tablet 3    calcium carbonate (CALCIUM 600) 600 MG TABS tablet Take 1 tablet by mouth daily  Prenatal MV-Min-Fe Fum-FA-DHA (PRENATAL 1 PO) Take by mouth       No current facility-administered medications for this visit. No Known Allergies  There were no vitals filed for this visit. There is no height or weight on file to calculate BMI. Due to this being a TeleHealth encounter, evaluation of the following organ systems is limited: Vitals/Constitutional/EENT/Resp/CV/GI//MS/Neuro/Skin/Heme-Lymph-Imm. Objective:     Pt is A&Ox3, in no acute distress. Normocephalic, atraumatic. PERRL. Resp even and non-labored. Skin pink, warm & dry. Gravid abdomen. VELEZ's well. Gait steady. MEDICATIONS:  No orders of the defined types were placed in this encounter. ORDERS:  No orders of the defined types were placed in this encounter. PLAN:  IUP at 31w2d wks      Diagnosis Orders   1. Prenatal care, antepartum        Pt counseled on balanced nutrition, adequate fluid intake, taking PNV daily, and exercise along with GHTN precautions, Kick count and  labor   Continue with routine prenatal care.  RTC in 2 wks for prenatal visit    Patient identification was verified at the start of the visit: Yes    Total time spent on this encounter: 10 mins     Pursuant to the emergency declaration under the [facility], [facility] waiver authority and the [employer] and [employer] Act, this Virtual  Visit was conducted, with patient's consent, to reduce the patient's risk of exposure to COVID-19 and provide continuity of care for an established patient. Washington Cruz MD, personally performed services described in this document as scribed by Janine Aragon RN in my presence, and it is both accurate and complete. Services were provided through a video synchronous discussion virtually to substitute for in-person clinic visit.

## 2021-03-12 ENCOUNTER — TELEMEDICINE (OUTPATIENT)
Dept: OBGYN CLINIC | Age: 36
End: 2021-03-12

## 2021-03-12 DIAGNOSIS — Z3A.34 34 WEEKS GESTATION OF PREGNANCY: ICD-10-CM

## 2021-03-12 DIAGNOSIS — Z34.90 PRENATAL CARE, ANTEPARTUM: Primary | ICD-10-CM

## 2021-03-12 PROCEDURE — 0502F SUBSEQUENT PRENATAL CARE: CPT | Performed by: NURSE PRACTITIONER

## 2021-03-12 NOTE — PROGRESS NOTES
medications for this visit. No Known Allergies  There were no vitals filed for this visit. There is no height or weight on file to calculate BMI. Due to this being a TeleHealth encounter, evaluation of the following organ systems is limited: Vitals/Constitutional/EENT/Resp/CV/GI//MS/Neuro/Skin/Heme-Lymph-Imm. Objective:     Pt is A&Ox3, in no acute distress. Normocephalic, atraumatic. PERRL. Resp even and non-labored. Skin pink, warm & dry. Gravid abdomen. VELEZ's well. Gait steady. MEDICATIONS:  No orders of the defined types were placed in this encounter. ORDERS:  No orders of the defined types were placed in this encounter. PLAN:  IUP at 34w2d wks      Diagnosis Orders   1. Prenatal care, antepartum     2. 34 weeks gestation of pregnancy        Pt counseled on balanced nutrition, adequate fluid intake, taking PNV daily, and exercise along with GHTN precautions, Kick count and  labor   Continue with routine prenatal care.  RTC in 1 wks for prenatal visit    Patient identification was verified at the start of the visit: Yes    Total time spent on this encounter: Not billed by time     Pursuant to the emergency declaration under the [facility] waiver authority and the [employer] and [employer] Act, this Virtual  Visit was conducted, with patient's consent, to reduce the patient's risk of exposure to COVID-19 and provide continuity of care for an established patient. Services were provided through a video synchronous discussion virtually to substitute for in-person clinic visit.

## 2021-03-17 ENCOUNTER — ROUTINE PRENATAL (OUTPATIENT)
Dept: OBGYN CLINIC | Age: 36
End: 2021-03-17

## 2021-03-17 VITALS
BODY MASS INDEX: 25.58 KG/M2 | WEIGHT: 149 LBS | HEART RATE: 97 BPM | DIASTOLIC BLOOD PRESSURE: 67 MMHG | SYSTOLIC BLOOD PRESSURE: 111 MMHG

## 2021-03-17 DIAGNOSIS — Z3A.35 35 WEEKS GESTATION OF PREGNANCY: Primary | ICD-10-CM

## 2021-03-17 DIAGNOSIS — Z23 NEED FOR TDAP VACCINATION: ICD-10-CM

## 2021-03-17 PROCEDURE — 0502F SUBSEQUENT PRENATAL CARE: CPT | Performed by: OBSTETRICS & GYNECOLOGY

## 2021-03-17 RX ORDER — LORATADINE 10 MG/1
10 CAPSULE, LIQUID FILLED ORAL DAILY
Status: ON HOLD | COMMUNITY
End: 2021-05-06 | Stop reason: HOSPADM

## 2021-03-17 NOTE — PATIENT INSTRUCTIONS
Patient Education        Weeks 34 to 39 of Your Pregnancy: Care Instructions  Overview     By now, your baby and your belly have grown quite large. It's almost time to give birth! Your baby's lungs are almost ready to breathe air. The skull bones are firm enough to protect your baby's head, but soft enough to move down through the birth canal.  You may be feeling excited and happy at times--but also anxious or scared. You might wonder how you'll know if you're in labor or what to expect during labor. Try to be open and flexible in your expectations of the birth. Because each birth is different, there's no way to know exactly what childbirth will be like for you. Talk to your doctor or midwife about any concerns you have. If you haven't already had the Tdap shot during this pregnancy, talk to your doctor about getting it. It will help protect your  against pertussis infection. In the 36th week, most women have a test for group B streptococcus (GBS). GBS is a common bacteria that can live in the vagina and rectum. It can make your baby sick after birth. If you test positive, you will get antibiotics during labor. The medicine will help keep your baby from getting the bacteria. Follow-up care is a key part of your treatment and safety. Be sure to make and go to all appointments, and call your doctor if you are having problems. It's also a good idea to know your test results and keep a list of the medicines you take. How can you care for yourself at home? Learn about pain relief choices  · Pain is different for every woman. Talk with your doctor about your feelings about pain. · You can choose from several types of pain relief. These include medicine or breathing techniques, as well as comfort measures. You can use more than one option. · If you choose to have pain medicine during labor, talk to your doctor about your options. Some medicines lower anxiety and help with some of the pain.  Others make your lower body numb so that you won't feel pain. · Be sure to tell your doctor about your pain medicine choice before you start labor or very early in your labor. You may be able to change your mind as labor progresses. · Rarely, a woman is put to sleep by medicine given through a mask or an IV. Labor and delivery  · The first stage of labor has three parts: early, active, and transition. ? Most women have early labor at home. You can stay busy or rest, eat light snacks, drink clear fluids, and start counting contractions. ? When talking during a contraction gets hard, you may be moving to active labor. During active labor, you should head for the hospital if you are not there already. ? You are in active labor when contractions come every 3 to 4 minutes and last about 60 seconds. Your cervix is opening more rapidly. ? If your water breaks, contractions will come faster and stronger. ? During transition, your cervix is stretching, and contractions are coming more rapidly. ? You may want to push, but your cervix might not be ready. Your doctor will tell you when to push. · The second stage starts when your cervix is completely opened and you are ready to push. ? Contractions are very strong to push the baby down the birth canal.  ? You will feel the urge to push. You may feel like you need to have a bowel movement. ? You may be coached to push with contractions. These contractions will be very strong, but you will not have them as often. You can get a little rest between contractions. ? You may be emotional and irritable. You may not be aware of what is going on around you.  ? One last push, and your baby is born. · The third stage is when a few more contractions push out the placenta. This may take 30 minutes or less. · The fourth stage is the welcome recovery. You may feel overwhelmed with emotions and exhausted but alert. This is a good time to start breastfeeding. Where can you learn more?   Go to [email or web address] and sign in to your [employer] account. Enter B542 in the [facility] box to learn more about \"Weeks 34 to 36 of Your Pregnancy: Care Instructions. \"     If you do not have an account, please click on the \"Sign Up Now\" link. Current as of: October 8, 2020               Content Version: 12.8  © 2006-2021 [employer], [facility]. Care instructions adapted under license by [facility] [place] ([facility]). If you have questions about a medical condition or this instruction, always ask your healthcare professional. [place] [address] any warranty or liability for your use of this information.

## 2021-03-17 NOTE — PROGRESS NOTES
After obtaining consent, and per orders of Dr. Rogelio Strauss, injection of TDAP given in Left deltoid by Emily Mendez. Patient instructed to remain in clinic for 20 minutes afterwards, and to report any adverse reaction to me immediately.

## 2021-03-17 NOTE — PROGRESS NOTES
IGale RN, am scribing for and in the presence of Dr. Sylvester Prado 3/17/2021/12:06 PM/sign      Subjective:  Camryn Tucker is here for a return obstetrical visit. Today she is 35w0d weeks EGA. She is doing well, taking her PNV as directed, and has no complaints. She  does not have vaginal bleeding, leaking, contractions, syncope, or headaches. Pt does feel fetal movement regularly. Objective: Mother's Prenatal Vitals  BP: 111/67  Weight: 149 lb (67.6 kg)  Pulse: 97  Patient Position: Sitting  Alb/Glu  Albumin: Negative  Glucose: Negative  Prenatal Fetal Information  Fetal Heart Rate: 163/US  Movement: Present  Cervical Exam  Dilation (cm): Closed  Presentation: Vertex  Dil/Eff/Sta  Dilation (cm): Closed  Pt is A&Ox3, in no acute distress. Normocephalic, atraumatic. PERRL. Resp even and non-labored. Skin pink, warm & dry. Gravid abdomen. VELEZ's well. Gait steady. GBS obtained  Assessment:    IUP at 35w0d wks      Diagnosis Orders   1. 35 weeks gestation of pregnancy  Culture, Strep B Screen, Vaginal/Rectal   2. Need for Tdap vaccination  Tetanus-Diphth-Acell Pertussis (BOOSTRIX) injection 0.5 mL    Tetanus-Diphth-Acell Pertussis (BOOSTRIX) injection 0.5 mL     Plan:  Pt counseled on balanced nutrition, adequate fluid intake, taking PNV daily, and exercise along with GHTN precautions, Kick count and  labor  Continue with routine prenatal care. T dap vaccine today.  surveillance not indicated  RTC in 1 wks for prenatal visit    MEDICATIONS:  Orders Placed This Encounter   Medications    Tetanus-Diphth-Acell Pertussis (BOOSTRIX) injection 0.5 mL    Tetanus-Diphth-Acell Pertussis (BOOSTRIX) injection 0.5 mL     ORDERS:  Orders Placed This Encounter   Procedures    Culture, [place], Vaginal/Rectal       ISylvester MD, personally performed services described in this document as scribed by Tan Marcelino RN in my presence, and it is both accurate and complete.

## 2021-03-20 LAB — GROUP B STREP CULTURE: NORMAL

## 2021-03-22 ENCOUNTER — ROUTINE PRENATAL (OUTPATIENT)
Dept: OBGYN CLINIC | Age: 36
End: 2021-03-22

## 2021-03-22 VITALS
WEIGHT: 148 LBS | HEART RATE: 86 BPM | BODY MASS INDEX: 25.4 KG/M2 | SYSTOLIC BLOOD PRESSURE: 111 MMHG | DIASTOLIC BLOOD PRESSURE: 70 MMHG

## 2021-03-22 DIAGNOSIS — Z3A.35 35 WEEKS GESTATION OF PREGNANCY: ICD-10-CM

## 2021-03-22 DIAGNOSIS — Z34.83 ENCOUNTER FOR SUPERVISION OF OTHER NORMAL PREGNANCY IN THIRD TRIMESTER: Primary | ICD-10-CM

## 2021-03-22 PROCEDURE — 0502F SUBSEQUENT PRENATAL CARE: CPT | Performed by: NURSE PRACTITIONER

## 2021-03-22 NOTE — PROGRESS NOTES
Pt presents today for routine prenatal visit. Pt denies vaginal bleeding, cramping, or leaking of fluid +fetal movement. She has already had her GBS done.

## 2021-03-22 NOTE — PROGRESS NOTES
Jovon Rob is here for a return obstetrical visit. Today she is 35w5d weeks EGA. She is doing well and has no complaints. She  does not have vaginal bleeding, leaking of fluid, contractions. She does not have blurred vision, SOB, or increased swelling in legs or face. Pt does feel fetal movement regularly. Objective: Mother's Prenatal Vitals  BP: 111/70  Weight: 148 lb (67.1 kg)  Pulse: 86  Patient Position: Sitting  Prenatal Fetal Information  Fundal Height (cm): 37 cm  Fetal Heart Rate: 146  Movement: Present  Cervical Exam  Dilation (cm): 1  Effacement (%): 70  Station: -2  Station (Labor Curve Graph): 7  Presentation: Vertex  Dil/Eff/Sta  Dilation (cm): 1  Effacement (%): 70  Station: -2  Pt is A&Ox3, in no acute distress. Normocephalic, atraumatic. PERRL. Resp even and non-labored. Skin pink, warm & dry. Gravid abdomen. VELEZ's well. Gait steady. Assessment:  IUP at 35w5d wks      Diagnosis Orders   1. Encounter for supervision of other normal pregnancy in third trimester     2. 35 weeks gestation of pregnancy       Plan:Pt counseled on GHTN precautions, Kick count and  labor  Continue with routine prenatal care. RTC in 1 wk for prenatal visit    MEDICATIONS:  No orders of the defined types were placed in this encounter. ORDERS:  No orders of the defined types were placed in this encounter.

## 2021-03-22 NOTE — PATIENT INSTRUCTIONS
Patient Education        Weeks 34 to 39 of Your Pregnancy: Care Instructions  Overview     By now, your baby and your belly have grown quite large. It's almost time to give birth! Your baby's lungs are almost ready to breathe air. The skull bones are firm enough to protect your baby's head, but soft enough to move down through the birth canal.  You may be feeling excited and happy at times--but also anxious or scared. You might wonder how you'll know if you're in labor or what to expect during labor. Try to be open and flexible in your expectations of the birth. Because each birth is different, there's no way to know exactly what childbirth will be like for you. Talk to your doctor or midwife about any concerns you have. If you haven't already had the Tdap shot during this pregnancy, talk to your doctor about getting it. It will help protect your  against pertussis infection. In the 36th week, most women have a test for group B streptococcus (GBS). GBS is a common bacteria that can live in the vagina and rectum. It can make your baby sick after birth. If you test positive, you will get antibiotics during labor. The medicine will help keep your baby from getting the bacteria. Follow-up care is a key part of your treatment and safety. Be sure to make and go to all appointments, and call your doctor if you are having problems. It's also a good idea to know your test results and keep a list of the medicines you take. How can you care for yourself at home? Learn about pain relief choices  · Pain is different for every woman. Talk with your doctor about your feelings about pain. · You can choose from several types of pain relief. These include medicine or breathing techniques, as well as comfort measures. You can use more than one option. · If you choose to have pain medicine during labor, talk to your doctor about your options. Some medicines lower anxiety and help with some of the pain.  Others make your lower body numb so that you won't feel pain. · Be sure to tell your doctor about your pain medicine choice before you start labor or very early in your labor. You may be able to change your mind as labor progresses. · Rarely, a woman is put to sleep by medicine given through a mask or an IV. Labor and delivery  · The first stage of labor has three parts: early, active, and transition. ? Most women have early labor at home. You can stay busy or rest, eat light snacks, drink clear fluids, and start counting contractions. ? When talking during a contraction gets hard, you may be moving to active labor. During active labor, you should head for the hospital if you are not there already. ? You are in active labor when contractions come every 3 to 4 minutes and last about 60 seconds. Your cervix is opening more rapidly. ? If your water breaks, contractions will come faster and stronger. ? During transition, your cervix is stretching, and contractions are coming more rapidly. ? You may want to push, but your cervix might not be ready. Your doctor will tell you when to push. · The second stage starts when your cervix is completely opened and you are ready to push. ? Contractions are very strong to push the baby down the birth canal.  ? You will feel the urge to push. You may feel like you need to have a bowel movement. ? You may be coached to push with contractions. These contractions will be very strong, but you will not have them as often. You can get a little rest between contractions. ? You may be emotional and irritable. You may not be aware of what is going on around you.  ? One last push, and your baby is born. · The third stage is when a few more contractions push out the placenta. This may take 30 minutes or less. · The fourth stage is the welcome recovery. You may feel overwhelmed with emotions and exhausted but alert. This is a good time to start breastfeeding. Where can you learn more?   Go to [email or web address] and sign in to your [employer] account. Enter U813 in the [employer] box to learn more about \"Weeks 34 to 36 of Your Pregnancy: Care Instructions. \"     If you do not have an account, please click on the \"Sign Up Now\" link. Current as of: October 8, 2020               Content Version: 12.8  © 2006-2021 [employer]. Care instructions adapted under license by [facility] ([facility]). If you have questions about a medical condition or this instruction, always ask your healthcare professional. [place] [address] any warranty or liability for your use of this information.

## 2021-03-30 ENCOUNTER — ROUTINE PRENATAL (OUTPATIENT)
Dept: OBGYN CLINIC | Age: 36
End: 2021-03-30

## 2021-03-30 VITALS
WEIGHT: 151 LBS | DIASTOLIC BLOOD PRESSURE: 71 MMHG | HEART RATE: 78 BPM | SYSTOLIC BLOOD PRESSURE: 104 MMHG | BODY MASS INDEX: 25.92 KG/M2

## 2021-03-30 DIAGNOSIS — Z34.83 ENCOUNTER FOR SUPERVISION OF OTHER NORMAL PREGNANCY IN THIRD TRIMESTER: Primary | ICD-10-CM

## 2021-03-30 PROCEDURE — 0502F SUBSEQUENT PRENATAL CARE: CPT | Performed by: OBSTETRICS & GYNECOLOGY

## 2021-03-30 NOTE — PROGRESS NOTES
I, Gale Borjas RN, am scribing for and in the presence of Dr. Jacek Baldwin 3/30/2021/10:32 AM/sign      Subjective:  Beryle Barrier is here for a return obstetrical visit. Today she is 36w6d weeks EGA. She is doing well, taking her PNV as directed, and has no complaints. She  does not have vaginal bleeding, leaking, contractions, syncope, or headaches. Pt does feel fetal movement regularly. She did receive first covid vaccine on 3/3/21, scheduled for second dose tomorrow. Objective: Mother's Prenatal Vitals  BP: 104/71  Weight: 151 lb (68.5 kg)  Pulse: 78  Patient Position: Sitting  Alb/Glu  Albumin: Negative  Glucose: Negative  Prenatal Fetal Information  Fundal Height (cm): 38 cm  Fetal Heart Rate: 150  Movement: Present  Cervical Exam  Dilation (cm): 1  Effacement (%): 70  Station: -2  Station (Labor Curve Graph): 7  Presentation: Vertex  Dil/Eff/Sta  Dilation (cm): 1  Effacement (%): 70  Station: -2  Pt is A&Ox3, in no acute distress. Normocephalic, atraumatic. PERRL. Resp even and non-labored. Skin pink, warm & dry. Gravid abdomen. VELEZ's well. Gait steady. Assessment:    IUP at 36w6d wks      Diagnosis Orders   1. Encounter for supervision of other normal pregnancy in third trimester       Plan:  Pt counseled on balanced nutrition, adequate fluid intake, taking PNV daily, and exercise along with labor precautions, GHTN precautions and Kick count  Continue with routine prenatal care.  surveillance not indicated  RTC in 1 wks for prenatal visit    MEDICATIONS:  No orders of the defined types were placed in this encounter. ORDERS:  No orders of the defined types were placed in this encounter. Kevin Novak MD, personally performed services described in this document as scribed by Tomeka Stafford RN in my presence, and it is both accurate and complete.

## 2021-03-30 NOTE — PATIENT INSTRUCTIONS
Patient Education        Week 37 of Your Pregnancy: Care Instructions  Your Care Instructions     You are near the end of your pregnancy--and you're probably pretty uncomfortable. It may be harder to walk around. Lying down probably isn't comfortable either. You may have trouble getting to sleep or staying asleep. Most women deliver their babies between 40 and 41 weeks. This is a good time to think about packing a bag for the hospital with items you'll need. Then you'll be ready when labor starts. Follow-up care is a key part of your treatment and safety. Be sure to make and go to all appointments, and call your doctor if you are having problems. It's also a good idea to know your test results and keep a list of the medicines you take. How can you care for yourself at home? Learn about breastfeeding  · Breastfeeding is best for your baby and good for you. · Breast milk has antibodies to help your baby fight infections. · Mothers who breastfeed often lose weight faster, because making milk burns calories. · Learning the best ways to hold your baby will make breastfeeding easier. · Let your partner bathe and diaper the baby to keep your partner from feeling left out. Snuggle together when you breastfeed. · You may want to learn how to use a breast pump and store your milk. · If you choose to bottle feed, make the feeding feel like breastfeeding so you can bond with your baby. Always hold your baby and the bottle. Do not prop bottles or let your baby fall asleep with a bottle. Learn about crying  · It is common for babies to cry for 1 to 3 hours a day. Some cry more, some cry less. · Babies don't cry to make you upset or because you are a bad parent. · Crying is how your baby communicates. Your baby may be hungry; have gas; need a diaper change; or feel cold, warm, tired, lonely, or tense. Sometimes babies cry for unknown reasons.   · If you respond to your baby's needs, he or she will learn to trust you.  · Try to stay calm when your baby cries. Your baby may get more upset if he or she senses that you are upset. Know how to care for your   · Your baby's umbilical cord stump will drop off on its own, usually between 1 and 2 weeks. To care for your baby's umbilical cord area:  ? Clean the area at the bottom of the cord 2 or 3 times a day. ? Pay special attention to the area where the cord attaches to the skin. ? Keep the diaper folded below the cord. ? Use a damp washcloth or cotton ball to sponge bathe your baby until the stump has come off. · Your baby's first dark stool is called meconium. After the meconium is passed, your baby will develop his or her own bowel pattern. ? Some babies, especially  babies, have several bowel movements a day. Others have one or two a day, or one every 2 to 3 days. ?  babies often have loose, yellow stools. Formula-fed babies have more formed stools. ? If your baby's stools look like little pellets, he or she is constipated. After 2 days of constipation, call your baby's doctor. · If your baby will be circumcised, you can care for him at home. ? Gently rinse his penis with warm water after every diaper change. Do not try to remove the film that forms on the penis. This film will go away on its own. Pat dry. ? Put petroleum ointment, such as Vaseline, on the area of the diaper that will touch your baby's penis. This will keep the diaper from sticking to your baby. ? Ask the doctor about giving your baby acetaminophen (Tylenol) for pain. Where can you learn more? Go to https://chjennifereb.[email or web address] org and sign in to your [employer] account. Enter 68 [id number] in the [facility] box to learn more about \"Week 37 of Your Pregnancy: Care Instructions. \"     If you do not have an account, please click on the \"Sign Up Now\" link. Current as of: 2020               Content Version: 12.8  © 9071-1346 Healthwise, [employer]. Care instructions adapted under license by [facility] ([facility]). If you have questions about a medical condition or this instruction, always ask your healthcare professional. [address] any warranty or liability for your use of this information.

## 2021-04-06 ENCOUNTER — ROUTINE PRENATAL (OUTPATIENT)
Dept: OBGYN CLINIC | Age: 36
End: 2021-04-06

## 2021-04-06 VITALS
BODY MASS INDEX: 25.92 KG/M2 | HEART RATE: 94 BPM | DIASTOLIC BLOOD PRESSURE: 68 MMHG | SYSTOLIC BLOOD PRESSURE: 110 MMHG | WEIGHT: 151 LBS

## 2021-04-06 DIAGNOSIS — Z34.83 ENCOUNTER FOR SUPERVISION OF OTHER NORMAL PREGNANCY IN THIRD TRIMESTER: Primary | ICD-10-CM

## 2021-04-06 DIAGNOSIS — Z3A.37 37 WEEKS GESTATION OF PREGNANCY: ICD-10-CM

## 2021-04-06 PROCEDURE — 0502F SUBSEQUENT PRENATAL CARE: CPT | Performed by: NURSE PRACTITIONER

## 2021-04-06 NOTE — PATIENT INSTRUCTIONS
Patient Education        Week 37 of Your Pregnancy: Care Instructions  Your Care Instructions     You are near the end of your pregnancy--and you're probably pretty uncomfortable. It may be harder to walk around. Lying down probably isn't comfortable either. You may have trouble getting to sleep or staying asleep. Most women deliver their babies between 40 and 41 weeks. This is a good time to think about packing a bag for the hospital with items you'll need. Then you'll be ready when labor starts. Follow-up care is a key part of your treatment and safety. Be sure to make and go to all appointments, and call your doctor if you are having problems. It's also a good idea to know your test results and keep a list of the medicines you take. How can you care for yourself at home? Learn about breastfeeding  · Breastfeeding is best for your baby and good for you. · Breast milk has antibodies to help your baby fight infections. · Mothers who breastfeed often lose weight faster, because making milk burns calories. · Learning the best ways to hold your baby will make breastfeeding easier. · Let your partner bathe and diaper the baby to keep your partner from feeling left out. Snuggle together when you breastfeed. · You may want to learn how to use a breast pump and store your milk. · If you choose to bottle feed, make the feeding feel like breastfeeding so you can bond with your baby. Always hold your baby and the bottle. Do not prop bottles or let your baby fall asleep with a bottle. Learn about crying  · It is common for babies to cry for 1 to 3 hours a day. Some cry more, some cry less. · Babies don't cry to make you upset or because you are a bad parent. · Crying is how your baby communicates. Your baby may be hungry; have gas; need a diaper change; or feel cold, warm, tired, lonely, or tense. Sometimes babies cry for unknown reasons.   · If you respond to your baby's needs, he or she will learn to trust you.  · Try to stay calm when your baby cries. Your baby may get more upset if he or she senses that you are upset. Know how to care for your   · Your baby's umbilical cord stump will drop off on its own, usually between 1 and 2 weeks. To care for your baby's umbilical cord area:  ? Clean the area at the bottom of the cord 2 or 3 times a day. ? Pay special attention to the area where the cord attaches to the skin. ? Keep the diaper folded below the cord. ? Use a damp washcloth or cotton ball to sponge bathe your baby until the stump has come off. · Your baby's first dark stool is called meconium. After the meconium is passed, your baby will develop his or her own bowel pattern. ? Some babies, especially  babies, have several bowel movements a day. Others have one or two a day, or one every 2 to 3 days. ?  babies often have loose, yellow stools. Formula-fed babies have more formed stools. ? If your baby's stools look like little pellets, he or she is constipated. After 2 days of constipation, call your baby's doctor. · If your baby will be circumcised, you can care for him at home. ? Gently rinse his penis with warm water after every diaper change. Do not try to remove the film that forms on the penis. This film will go away on its own. Pat dry. ? Put petroleum ointment, such as Vaseline, on the area of the diaper that will touch your baby's penis. This will keep the diaper from sticking to your baby. ? Ask the doctor about giving your baby acetaminophen (Tylenol) for pain. Where can you learn more? Go to https://chjennifereb.health[employer]. org and sign in to your [employer] account. Enter 68 21 [id number] in the [facility] box to learn more about \"Week 37 of Your Pregnancy: Care Instructions. \"     If you do not have an account, please click on the \"Sign Up Now\" link. Current as of: 2020               Content Version: 12.8  © 3459-9595 Healthwise, [employer].

## 2021-04-06 NOTE — PROGRESS NOTES
Andres Farley is here for a return obstetrical visit. Today she is 37w6d weeks EGA. She is doing well and has no complaints. She  does not have vaginal bleeding, leaking of fluid, contractions. She does not have blurred vision, SOB, or increased swelling in legs or face. Pt does feel fetal movement regularly. Objective: Mother's Prenatal Vitals  BP: 110/68  Weight: 151 lb (68.5 kg)  Pulse: 94  Patient Position: Sitting  Prenatal Fetal Information  Fetal Heart Rate: 150  Movement: Present  Pt is A&Ox3, in no acute distress. Normocephalic, atraumatic. PERRL. Resp even and non-labored. Skin pink, warm & dry. Gravid abdomen. VELEZ's well. Gait steady. Assessment:  IUP at 37w6d wks      Diagnosis Orders   1. Encounter for supervision of other normal pregnancy in third trimester     2. 37 weeks gestation of pregnancy       Plan:Pt counseled on GHTN precautions, Kick count and  labor  Continue with routine prenatal care. RTC in 1 wk for prenatal visit    MEDICATIONS:  No orders of the defined types were placed in this encounter. ORDERS:  No orders of the defined types were placed in this encounter.

## 2021-04-13 ENCOUNTER — ROUTINE PRENATAL (OUTPATIENT)
Dept: OBGYN CLINIC | Age: 36
End: 2021-04-13

## 2021-04-13 VITALS
DIASTOLIC BLOOD PRESSURE: 73 MMHG | SYSTOLIC BLOOD PRESSURE: 105 MMHG | HEART RATE: 115 BPM | BODY MASS INDEX: 26.26 KG/M2 | WEIGHT: 153 LBS

## 2021-04-13 DIAGNOSIS — Z34.83 ENCOUNTER FOR SUPERVISION OF OTHER NORMAL PREGNANCY IN THIRD TRIMESTER: Primary | ICD-10-CM

## 2021-04-13 PROCEDURE — 0502F SUBSEQUENT PRENATAL CARE: CPT | Performed by: OBSTETRICS & GYNECOLOGY

## 2021-04-13 NOTE — PROGRESS NOTES
I, Gale Borjas RN, am scribing for and in the presence of Dr. Agustina Stern 2021/3:38 PM/sign      Subjective:  Joshua Enriquez is here for a return obstetrical visit. Today she is 38w6d weeks EGA. She is doing well, taking her PNV as directed, and has no complaints. She  does not have vaginal bleeding, leaking, contractions, syncope, or headaches. Pt does feel fetal movement regularly. Objective: Mother's Prenatal Vitals  BP: 105/73  Weight: 153 lb (69.4 kg)  Pulse: 115  Patient Position: Sitting  Alb/Glu  Albumin: Negative  Glucose: Negative  Prenatal Fetal Information  Fundal Height (cm): 39 cm  Fetal Heart Rate: 152  Movement: Present  Cervical Exam  Dilation (cm): 2  Effacement (%): 80  Station: -2  Station (Labor Curve Graph): 7  Presentation: Vertex  Dil/Eff/Sta  Dilation (cm): 2  Effacement (%): 80  Station: -2  Pt is A&Ox3, in no acute distress. Normocephalic, atraumatic. PERRL. Resp even and non-labored. Skin pink, warm & dry. Gravid abdomen. VELEZ's well. Gait steady. Assessment:    IUP at 38w6d wks      Diagnosis Orders   1. Encounter for supervision of other normal pregnancy in third trimester       Plan:  Pt counseled on balanced nutrition, adequate fluid intake, taking PNV daily, and exercise along with labor precautions, GHTN precautions and Kick count  Continue with routine prenatal care.  surveillance not indicated  RTC in 1 wks for prenatal visit    MEDICATIONS:  No orders of the defined types were placed in this encounter. ORDERS:  No orders of the defined types were placed in this encounter. Brandon Conklin MD, personally performed services described in this document as scribed by Dorette Kayser, RN in my presence, and it is both accurate and complete.

## 2021-04-13 NOTE — PATIENT INSTRUCTIONS
Patient Education        Week 45 of Your Pregnancy: Care Instructions  Your Care Instructions     Believe it or not, your baby is almost here. You may have ideas about your baby's personality because of how much he or she moves. Or you may have noticed how he or she responds to sounds, warmth, cold, and light. You may even know what kind of music your baby likes. By now, you have a better idea of what to expect during delivery. You may have talked about your birth preferences with your doctor. But even if you want a vaginal birth, it is a good idea to learn about  births.  birth means that your baby is born through a cut (incision) in your lower belly. It is sometimes the best choice for the health of the baby and the mother. Follow-up care is a key part of your treatment and safety. Be sure to make and go to all appointments, and call your doctor if you are having problems. It's also a good idea to know your test results and keep a list of the medicines you take. How can you care for yourself at home? Learn about  birth  · Most C-sections are unplanned. They are done because of problems that occur during labor. These problems might include:  ? Labor that slows or stops. ? High blood pressure or other problems for the mother. ? Signs of distress in the baby. These signs may include a very fast or slow heart rate. · Although most mothers and babies do well after , it is major surgery. It has more risks than a vaginal delivery. · In some cases, a planned  may be safer than a vaginal delivery. This may be the case if:  ? The mother has a health problem, such as a heart condition. ? The baby isn't in a head-down position for delivery. This is called a breech position. ? The uterus has scars from past surgeries. This could increase the chance of a tear in the uterus. ? There is a problem with the placenta. ?  The mother has an infection, such as genital herpes, that could be spread to the baby. ? The mother is having twins or more. ? The baby weighs 9 to 10 pounds or more. · Because of the risks of , planned C-sections generally should be done only for medical reasons. And a planned  should be done at 39 weeks or later unless there is a medical reason to do it sooner. Know what to expect after delivery, and plan for the first few weeks at home  · You, your baby, and your partner or  will get identification bands. Only people with matching bands can  the baby from the nursery. · You will learn how to feed, diaper, and bathe your baby. And you will learn how to care for the umbilical cord stump. If your baby will be circumcised, you will also learn how to care for that. · Ask people to wait to visit you until you are at home. And ask them to wash their hands before they touch your baby. · Make sure you have another adult in your home for at least 2 or 3 days after the birth. · During the first 2 weeks, limit when friends and family can visit. · Do not allow visitors who have colds or infections. Make sure all visitors are up to date with their vaccinations. Never let anyone smoke around your baby. · Try to nap when the baby naps. Be aware of postpartum depression  · \"Baby blues\" are common for the first 1 to 2 weeks after birth. You may cry or feel sad or irritable for no reason. · For some women, these feelings last longer and are more intense. This is called postpartum depression. · If your symptoms last for more than a few weeks or you feel very depressed, ask your doctor for help. · Postpartum depression can be treated. Support groups and counseling can help. Sometimes medicine can also help. Where can you learn more? Go to https://alba.[employer]. org and sign in to your writewith account. Enter B044 in the PPTV box to learn more about \"Week 38 of Your Pregnancy: Care Instructions. \"     If you do not have an account, please click on the \"Sign Up Now\" link. Current as of: October 8, 2020               Content Version: 12.8  © 2006-2021 Healthwise, Incorporated. Care instructions adapted under license by [facility] ([facility]). If you have questions about a medical condition or this instruction, always ask your healthcare professional. [address] any warranty or liability for your use of this information.

## 2021-04-20 ENCOUNTER — ROUTINE PRENATAL (OUTPATIENT)
Dept: OBGYN CLINIC | Age: 36
End: 2021-04-20

## 2021-04-20 VITALS
WEIGHT: 151 LBS | SYSTOLIC BLOOD PRESSURE: 110 MMHG | HEART RATE: 83 BPM | DIASTOLIC BLOOD PRESSURE: 70 MMHG | BODY MASS INDEX: 25.92 KG/M2

## 2021-04-20 DIAGNOSIS — Z34.83 ENCOUNTER FOR SUPERVISION OF OTHER NORMAL PREGNANCY IN THIRD TRIMESTER: Primary | ICD-10-CM

## 2021-04-20 PROCEDURE — 0502F SUBSEQUENT PRENATAL CARE: CPT | Performed by: OBSTETRICS & GYNECOLOGY

## 2021-04-20 NOTE — PROGRESS NOTES
Pt is here for prenatal appointment. She denies spotting, cramping. She thinks she had some contractions over the weekend, they weren't consistent.

## 2021-04-20 NOTE — PATIENT INSTRUCTIONS
Patient Education        Week 39 of Your Pregnancy: Care Instructions  Your Care Instructions     During these final weeks, you may feel anxious to see your new baby.  babies often look different from what you see in pictures or movies. Right after birth, their heads may have a strange shape. Their eyes may be puffy. And their genitals may be swollen. They may also have very dry skin, or red marks on the eyelids, nose, or neck. Still, most parents think their babies are beautiful. Follow-up care is a key part of your treatment and safety. Be sure to make and go to all appointments, and call your doctor if you are having problems. It's also a good idea to know your test results and keep a list of the medicines you take. How can you care for yourself at home? Prepare to breastfeed  · If you are breastfeeding, continue to eat healthy foods. · If your health care provider recommends it, keep taking your prenatal vitamins. · Talk to your doctor before you take any medicine or supplement. That's because some medicines can affect your breast milk. · You can help prevent sore nipples if you feed your baby in the correct position. Nurses will help you learn to do this. Choose the right birth control after your baby is born  · Women who are breastfeeding can still get pregnant. Use birth control if you don't want to get pregnant. · Intrauterine devices (IUDs) are very effective at preventing pregnancy and can provide birth control for 3 to 10 years, depending on the type. If you talk with your doctor before having your baby, the IUD can be placed right after you give birth. If you decide you want to get pregnant later, you can have it removed. They are safe to use while you are breastfeeding. · A hormonal implant is also very effective at preventing pregnancy. It is placed under the skin of the arm. This can be done right after you give birth.  It releases the hormone progestin and prevents pregnancy for about 3 years. This can also be removed by a doctor at any time. It is safe to use while you are breastfeeding. · Depo-Provera can be used while you are breastfeeding. It is a shot you get every 3 months. · Birth control pills work well. But you need a different kind of pill for the first few weeks after giving birth. And when you start taking these pills, you need to make sure to use another type of birth control for 7 days after you start your pack. · Diaphragms, cervical caps, and condoms with spermicide work less well after birth. If you have a diaphragm or cervical cap, talk to your doctor to see if you need a different size. · Tubal ligation (tying your tubes) and vasectomy are both permanent. These are good options if you are sure you are done having children. Where can you learn more? Go to https://Mapppejose de jesuseweb.healthGravity Renewables. org and sign in to your [employer] account. Enter D453 in the Vendobots box to learn more about \"Week 39 of Your Pregnancy: Care Instructions. \"     If you do not have an account, please click on the \"Sign Up Now\" link. Current as of: October 8, 2020               Content Version: 12.8  © 2006-2021 Healthwise, Incorporated. Care instructions adapted under license by [facility] ([facility]). If you have questions about a medical condition or this instruction, always ask your healthcare professional. [address]  any warranty or liability for your use of this information.

## 2021-04-22 ENCOUNTER — TELEPHONE (OUTPATIENT)
Dept: OBGYN CLINIC | Age: 36
End: 2021-04-22

## 2021-04-23 ENCOUNTER — ROUTINE PRENATAL (OUTPATIENT)
Dept: OBGYN CLINIC | Age: 36
End: 2021-04-23
Payer: COMMERCIAL

## 2021-04-23 VITALS
HEART RATE: 91 BPM | SYSTOLIC BLOOD PRESSURE: 113 MMHG | WEIGHT: 153 LBS | BODY MASS INDEX: 26.26 KG/M2 | DIASTOLIC BLOOD PRESSURE: 73 MMHG

## 2021-04-23 DIAGNOSIS — Z3A.40 40 WEEKS GESTATION OF PREGNANCY: ICD-10-CM

## 2021-04-23 DIAGNOSIS — O48.0 POST-TERM PREGNANCY, 40-42 WEEKS OF GESTATION: Primary | ICD-10-CM

## 2021-04-23 PROCEDURE — 59025 FETAL NON-STRESS TEST: CPT | Performed by: NURSE PRACTITIONER

## 2021-04-23 PROCEDURE — 0502F SUBSEQUENT PRENATAL CARE: CPT | Performed by: NURSE PRACTITIONER

## 2021-04-23 NOTE — PROGRESS NOTES
Patient presents today for NST   Stuart Risk is here for a return obstetrical visit. Today she is 40w2d weeks EGA. She is doing well and has no complaints. NST reactive, 1 contraction. She  does not have vaginal bleeding, leaking of fluid, contractions. She does not have blurred vision, SOB, or increased swelling in legs or face. Pt does feel fetal movement regularly. O:   Vitals:    04/23/21 1338   BP: 113/73   Pulse: 91   Weight: 153 lb (69.4 kg)     Pt is A&Ox3, in no acute distress. Normocephalic, atraumatic. PERRL. Resp even and non-labored. Skin pink, warm & dry. Gravid abdomen. VELEZ's well. Gait steady. See OB flowsheet. A: Normal IUP at 40w2d wks      Diagnosis Orders   1. Post-term pregnancy, 40-42 weeks of gestation  0 - [place] FETAL NON-STRESS TEST   2. 40 weeks gestation of pregnancy  45759 - [place] FETAL NON-STRESS TEST       P:   Pt counseled on labor precautions, PIH precautions and Kick count  Continue with routine prenatal care. RTC Tuesday with nst  MEDICATIONS:  No orders of the defined types were placed in this encounter.       ORDERS:  Orders Placed This Encounter   Procedures    0 - [place] FETAL NON-STRESS TEST

## 2021-04-27 ENCOUNTER — ROUTINE PRENATAL (OUTPATIENT)
Dept: OBGYN CLINIC | Age: 36
End: 2021-04-27
Payer: COMMERCIAL

## 2021-04-27 VITALS
DIASTOLIC BLOOD PRESSURE: 68 MMHG | WEIGHT: 152 LBS | BODY MASS INDEX: 26.09 KG/M2 | SYSTOLIC BLOOD PRESSURE: 102 MMHG | HEART RATE: 86 BPM

## 2021-04-27 DIAGNOSIS — O48.0 POST-TERM PREGNANCY, 40-42 WEEKS OF GESTATION: Primary | ICD-10-CM

## 2021-04-27 PROCEDURE — 0502F SUBSEQUENT PRENATAL CARE: CPT | Performed by: OBSTETRICS & GYNECOLOGY

## 2021-04-27 PROCEDURE — 59025 FETAL NON-STRESS TEST: CPT | Performed by: OBSTETRICS & GYNECOLOGY

## 2021-04-27 NOTE — PATIENT INSTRUCTIONS
Patient Education        Week 40 of Your Pregnancy: Care Instructions  Overview     By week 36, you have reached your due date. Your baby could be coming any day. But it's a good idea to think ahead to the next few weeks and what might happen. If this is your first time having a baby, try not to worry. If you don't start labor on your own by 41 or 42 weeks, your doctor may recommend giving you medicines to start labor. Talk to your doctor or midwife about breathing exercises you can do if you start to feel anxious or if you're trying to relax. Follow-up care is a key part of your treatment and safety. Be sure to make and go to all appointments, and call your doctor if you are having problems. It's also a good idea to know your test results and keep a list of the medicines you take. How can you care for yourself at home? Learn how labor can be started  · If you and your baby are both healthy and ready, and if your cervix has started to open, your doctor may \"break your water\" (rupture the amniotic sac). This often starts labor. · If your cervix is not quite ready, you may get a medicine called Pitocin through an IV to start contractions. · If your cervix is still very firm, you may have prostaglandin tablets (misoprostol) placed in your vagina to soften the cervix. Try guided imagery to help you relax  · Find a comfortable place to sit or lie down. Close your eyes. · Start by just taking a few deep breaths to help you relax. · Picture a setting that is calm and peaceful. This could be a beach, a mountain setting, a meadow, or a scene that you choose. · Imagine your scene, and try to add some detail. For example, is there a breeze? What does the nicole look like? Is it clear, or are there clouds? · It often helps to add a path to your scene. For example, as you enter the meadow, imagine a path leading you through the meadow to the trees on the other side.  As you follow the path farther into the [facility] you feel more and more relaxed. · When you are deep into your scene and are feeling relaxed, take a few minutes to breathe slowly and feel the calm. · When you are ready, slowly take yourself out of the scene back to the present. Tell yourself that you will feel relaxed and refreshed and will bring that sense of calm with you. · Count to 3, and open your eyes. Where can you learn more? Go to https://Pickatalepepiceweb.[employer]. org and sign in to your Believe.in account. Enter D881 in the Livevol box to learn more about \"Week 40 of Your Pregnancy: Care Instructions. \"     If you do not have an account, please click on the \"Sign Up Now\" link. Current as of: October 8, 2020               Content Version: 12.8  © 7947-4090 Healthwise, Incorporated. Care instructions adapted under license by [facility] ([facility]). If you have questions about a medical condition or this instruction, always ask your healthcare professional. [place] [address] any warranty or liability for your use of this information.

## 2021-04-27 NOTE — PROGRESS NOTES
IGale RN, am scribing for and in the presence of Dr. Kade Castillo 2021/3:12 PM/sign      Subjective:  Guadalupe Wilkins is here for a return obstetrical visit. Today she is 40w6d weeks EGA. She is doing well, taking her PNV as directed, and has no complaints. She  does not have vaginal bleeding, leaking, contractions, syncope, or headaches. Pt does feel fetal movement regularly. Objective: Mother's Prenatal Vitals  BP: 102/68  Weight: 152 lb (68.9 kg)  Pulse: 86  Patient Position: Sitting  Alb/Glu  Albumin: Negative  Glucose: Negative  Prenatal Fetal Information  Fetal Heart Rate: 139/NST  Movement: Present  Pt is A&Ox3, in no acute distress. Normocephalic, atraumatic. PERRL. Resp even and non-labored. Skin pink, warm & dry. Gravid abdomen. VELEZ's well. Gait steady. NST today showed moderate variability, baseline FHR in 140's, once decel with movement of patient noted. occ contraction. Assessment:    IUP at 40w6d wks      Diagnosis Orders   1. Post-term pregnancy, 40-42 weeks of gestation  0 - [place] FETAL NON-STRESS TEST     Plan:  Pt counseled on balanced nutrition, adequate fluid intake, taking PNV daily, and exercise along with labor precautions, GHTN precautions and Kick count  Continue with routine prenatal care.  surveillance indicated for post dates  RTC in 3 days for prenatal visit with NST  IOL blanco'd for 5/4    MEDICATIONS:  No orders of the defined types were placed in this encounter. ORDERS:  Orders Placed This Encounter   Procedures    0 - [place] FETAL NON-STRESS TEST         IKade MD, personally performed services described in this document as scribed by Vicky Gustafson RN in my presence, and it is both accurate and complete.

## 2021-04-30 ENCOUNTER — ROUTINE PRENATAL (OUTPATIENT)
Dept: OBGYN CLINIC | Age: 36
End: 2021-04-30
Payer: COMMERCIAL

## 2021-04-30 VITALS
HEART RATE: 74 BPM | BODY MASS INDEX: 26.09 KG/M2 | DIASTOLIC BLOOD PRESSURE: 73 MMHG | SYSTOLIC BLOOD PRESSURE: 107 MMHG | WEIGHT: 152 LBS

## 2021-04-30 DIAGNOSIS — O48.0 POST-TERM PREGNANCY, 40-42 WEEKS OF GESTATION: Primary | ICD-10-CM

## 2021-04-30 PROCEDURE — 0502F SUBSEQUENT PRENATAL CARE: CPT | Performed by: OBSTETRICS & GYNECOLOGY

## 2021-04-30 PROCEDURE — 59025 FETAL NON-STRESS TEST: CPT | Performed by: OBSTETRICS & GYNECOLOGY

## 2021-04-30 NOTE — PATIENT INSTRUCTIONS
learn more about \"Week 41 of Your Pregnancy: Care Instructions. \"     If you do not have an account, please click on the \"Sign Up Now\" link. Current as of: October 8, 2020               Content Version: 12.8  © 8439-4873 Healthwise, Incorporated. Care instructions adapted under license by [facility] ([facility]). If you have questions about a medical condition or this instruction, always ask your healthcare professional. [place] [address] any warranty or liability for your use of this information.

## 2021-05-04 ENCOUNTER — HOSPITAL ENCOUNTER (INPATIENT)
Age: 36
LOS: 2 days | Discharge: HOME | End: 2021-05-06
Attending: OBSTETRICS & GYNECOLOGY | Admitting: OBSTETRICS & GYNECOLOGY
Payer: COMMERCIAL

## 2021-05-04 ENCOUNTER — ANESTHESIA (OUTPATIENT)
Dept: LABOR AND DELIVERY | Age: 36
End: 2021-05-04
Payer: COMMERCIAL

## 2021-05-04 ENCOUNTER — ANESTHESIA EVENT (OUTPATIENT)
Dept: LABOR AND DELIVERY | Age: 36
End: 2021-05-04
Payer: COMMERCIAL

## 2021-05-04 VITALS — TEMPERATURE: 98 F | DIASTOLIC BLOOD PRESSURE: 55 MMHG | OXYGEN SATURATION: 98 % | SYSTOLIC BLOOD PRESSURE: 100 MMHG

## 2021-05-04 DIAGNOSIS — Z98.891 S/P C-SECTION: Primary | ICD-10-CM

## 2021-05-04 PROBLEM — O48.0 POST TERM PREGNANCY AT 41 WEEKS GESTATION: Status: ACTIVE | Noted: 2021-05-04

## 2021-05-04 PROBLEM — Z3A.41 POST TERM PREGNANCY AT 41 WEEKS GESTATION: Status: ACTIVE | Noted: 2021-05-04

## 2021-05-04 LAB
ABO/RH: NORMAL
AMPHETAMINE SCREEN, URINE: NEGATIVE
ANTIBODY SCREEN: NORMAL
BARBITURATE SCREEN URINE: NEGATIVE
BASE EXCESS ARTERIAL: -3.6 MMOL/L (ref -2–2)
BASE EXCESS VENOUS: -4 MMOL/L
BENZODIAZEPINE SCREEN, URINE: NEGATIVE
CANNABINOID SCREEN URINE: NEGATIVE
CARBOXYHEMOGLOBIN ARTERIAL: 1.4 % (ref 0–5)
CARBOXYHEMOGLOBIN: 1.7 %
COCAINE METABOLITE SCREEN URINE: NEGATIVE
HCO3 ARTERIAL: 24.7 MMOL/L (ref 22–26)
HCO3 VENOUS: 22 MMOL/L (ref 23–29)
HCT VFR BLD CALC: 35.7 % (ref 37–47)
HEMOGLOBIN, ART, EXTENDED: 12.2 G/DL (ref 12–16)
HEMOGLOBIN: 10.7 G/DL (ref 12–16)
Lab: NORMAL
MCH RBC QN AUTO: 22.6 PG (ref 27–31)
MCHC RBC AUTO-ENTMCNC: 30 G/DL (ref 33–37)
MCV RBC AUTO: 75.3 FL (ref 81–99)
METHEMOGLOBIN ARTERIAL: 1.2 %
METHEMOGLOBIN VENOUS: 1.4 %
O2 CONTENT ARTERIAL: 7 ML/DL
O2 CONTENT, VEN: 11 ML/DL
O2 SAT, ARTERIAL: 41 %
O2 SAT, VEN: 69 %
O2 THERAPY: ABNORMAL
OPIATE SCREEN URINE: NEGATIVE
PCO2 ARTERIAL: 59 MMHG (ref 35–45)
PCO2, VEN: 39 MMHG (ref 40–50)
PDW BLD-RTO: 17.1 % (ref 11.5–14.5)
PH ARTERIAL: 7.23 (ref 7.35–7.45)
PH VENOUS: 7.35 (ref 7.35–7.45)
PLATELET # BLD: 170 K/UL (ref 130–400)
PMV BLD AUTO: 9.6 FL (ref 9.4–12.3)
PO2 ARTERIAL: 21 MMHG (ref 80–100)
PO2, VEN: 32 MMHG
POTASSIUM, WHOLE BLOOD: 6
RBC # BLD: 4.74 M/UL (ref 4.2–5.4)
WBC # BLD: 7.9 K/UL (ref 4.8–10.8)

## 2021-05-04 PROCEDURE — 86901 BLOOD TYPING SEROLOGIC RH(D): CPT

## 2021-05-04 PROCEDURE — 36600 WITHDRAWAL OF ARTERIAL BLOOD: CPT

## 2021-05-04 PROCEDURE — 85027 COMPLETE CBC AUTOMATED: CPT

## 2021-05-04 PROCEDURE — 2580000003 HC RX 258: Performed by: OBSTETRICS & GYNECOLOGY

## 2021-05-04 PROCEDURE — 6370000000 HC RX 637 (ALT 250 FOR IP): Performed by: OBSTETRICS & GYNECOLOGY

## 2021-05-04 PROCEDURE — 6360000002 HC RX W HCPCS: Performed by: OBSTETRICS & GYNECOLOGY

## 2021-05-04 PROCEDURE — 6360000002 HC RX W HCPCS: Performed by: NURSE ANESTHETIST, CERTIFIED REGISTERED

## 2021-05-04 PROCEDURE — 1220000000 HC SEMI PRIVATE OB R&B

## 2021-05-04 PROCEDURE — 59514 CESAREAN DELIVERY ONLY: CPT | Performed by: ADVANCED PRACTICE MIDWIFE

## 2021-05-04 PROCEDURE — 3700000001 HC ADD 15 MINUTES (ANESTHESIA): Performed by: OBSTETRICS & GYNECOLOGY

## 2021-05-04 PROCEDURE — 36415 COLL VENOUS BLD VENIPUNCTURE: CPT

## 2021-05-04 PROCEDURE — 7100000000 HC PACU RECOVERY - FIRST 15 MIN: Performed by: OBSTETRICS & GYNECOLOGY

## 2021-05-04 PROCEDURE — 2709999900 HC NON-CHARGEABLE SUPPLY: Performed by: OBSTETRICS & GYNECOLOGY

## 2021-05-04 PROCEDURE — 7100000001 HC PACU RECOVERY - ADDTL 15 MIN: Performed by: OBSTETRICS & GYNECOLOGY

## 2021-05-04 PROCEDURE — 3609079900 HC CESAREAN SECTION: Performed by: OBSTETRICS & GYNECOLOGY

## 2021-05-04 PROCEDURE — 2500000003 HC RX 250 WO HCPCS: Performed by: NURSE ANESTHETIST, CERTIFIED REGISTERED

## 2021-05-04 PROCEDURE — 86900 BLOOD TYPING SEROLOGIC ABO: CPT

## 2021-05-04 PROCEDURE — 86592 SYPHILIS TEST NON-TREP QUAL: CPT

## 2021-05-04 PROCEDURE — 80307 DRUG TEST PRSMV CHEM ANLYZR: CPT

## 2021-05-04 PROCEDURE — 86850 RBC ANTIBODY SCREEN: CPT

## 2021-05-04 PROCEDURE — 84132 ASSAY OF SERUM POTASSIUM: CPT

## 2021-05-04 PROCEDURE — 3700000000 HC ANESTHESIA ATTENDED CARE: Performed by: OBSTETRICS & GYNECOLOGY

## 2021-05-04 PROCEDURE — 59510 CESAREAN DELIVERY: CPT | Performed by: OBSTETRICS & GYNECOLOGY

## 2021-05-04 PROCEDURE — 82803 BLOOD GASES ANY COMBINATION: CPT

## 2021-05-04 RX ORDER — HYDRALAZINE HYDROCHLORIDE 20 MG/ML
5 INJECTION INTRAMUSCULAR; INTRAVENOUS EVERY 10 MIN PRN
Status: DISCONTINUED | OUTPATIENT
Start: 2021-05-04 | End: 2021-05-06 | Stop reason: HOSPADM

## 2021-05-04 RX ORDER — SODIUM CHLORIDE 0.9 % (FLUSH) 0.9 %
10 SYRINGE (ML) INJECTION PRN
Status: DISCONTINUED | OUTPATIENT
Start: 2021-05-04 | End: 2021-05-06 | Stop reason: HOSPADM

## 2021-05-04 RX ORDER — MIDAZOLAM HYDROCHLORIDE 1 MG/ML
INJECTION INTRAMUSCULAR; INTRAVENOUS PRN
Status: DISCONTINUED | OUTPATIENT
Start: 2021-05-04 | End: 2021-05-04 | Stop reason: SDUPTHER

## 2021-05-04 RX ORDER — ONDANSETRON 2 MG/ML
4 INJECTION INTRAMUSCULAR; INTRAVENOUS EVERY 6 HOURS PRN
Status: DISCONTINUED | OUTPATIENT
Start: 2021-05-04 | End: 2021-05-06 | Stop reason: HOSPADM

## 2021-05-04 RX ORDER — SODIUM CHLORIDE 0.9 % (FLUSH) 0.9 %
10 SYRINGE (ML) INJECTION EVERY 12 HOURS SCHEDULED
Status: DISCONTINUED | OUTPATIENT
Start: 2021-05-04 | End: 2021-05-06 | Stop reason: HOSPADM

## 2021-05-04 RX ORDER — CARBOPROST TROMETHAMINE 250 UG/ML
250 INJECTION, SOLUTION INTRAMUSCULAR PRN
Status: DISCONTINUED | OUTPATIENT
Start: 2021-05-04 | End: 2021-05-06 | Stop reason: HOSPADM

## 2021-05-04 RX ORDER — CEFAZOLIN SODIUM 1 G/3ML
INJECTION, POWDER, FOR SOLUTION INTRAMUSCULAR; INTRAVENOUS PRN
Status: DISCONTINUED | OUTPATIENT
Start: 2021-05-04 | End: 2021-05-04 | Stop reason: SDUPTHER

## 2021-05-04 RX ORDER — KETOROLAC TROMETHAMINE 30 MG/ML
30 INJECTION, SOLUTION INTRAMUSCULAR; INTRAVENOUS EVERY 6 HOURS
Status: DISPENSED | OUTPATIENT
Start: 2021-05-04 | End: 2021-05-05

## 2021-05-04 RX ORDER — LIDOCAINE HYDROCHLORIDE 10 MG/ML
INJECTION, SOLUTION EPIDURAL; INFILTRATION; INTRACAUDAL; PERINEURAL PRN
Status: DISCONTINUED | OUTPATIENT
Start: 2021-05-04 | End: 2021-05-04 | Stop reason: SDUPTHER

## 2021-05-04 RX ORDER — PRENATAL VIT/IRON FUM/FOLIC AC 27MG-0.8MG
1 TABLET ORAL DAILY
Status: DISCONTINUED | OUTPATIENT
Start: 2021-05-04 | End: 2021-05-06 | Stop reason: HOSPADM

## 2021-05-04 RX ORDER — MORPHINE SULFATE 4 MG/ML
2 INJECTION, SOLUTION INTRAMUSCULAR; INTRAVENOUS
Status: DISCONTINUED | OUTPATIENT
Start: 2021-05-04 | End: 2021-05-06 | Stop reason: HOSPADM

## 2021-05-04 RX ORDER — SODIUM CHLORIDE 9 MG/ML
25 INJECTION, SOLUTION INTRAVENOUS PRN
Status: DISCONTINUED | OUTPATIENT
Start: 2021-05-04 | End: 2021-05-04 | Stop reason: SDUPTHER

## 2021-05-04 RX ORDER — SODIUM CHLORIDE 0.9 % (FLUSH) 0.9 %
10 SYRINGE (ML) INJECTION PRN
Status: DISCONTINUED | OUTPATIENT
Start: 2021-05-04 | End: 2021-05-04 | Stop reason: SDUPTHER

## 2021-05-04 RX ORDER — SODIUM CHLORIDE 0.9 % (FLUSH) 0.9 %
10 SYRINGE (ML) INJECTION EVERY 12 HOURS SCHEDULED
Status: DISCONTINUED | OUTPATIENT
Start: 2021-05-04 | End: 2021-05-04 | Stop reason: SDUPTHER

## 2021-05-04 RX ORDER — ACETAMINOPHEN 325 MG/1
650 TABLET ORAL EVERY 4 HOURS PRN
Status: DISCONTINUED | OUTPATIENT
Start: 2021-05-04 | End: 2021-05-06 | Stop reason: HOSPADM

## 2021-05-04 RX ORDER — ONDANSETRON 2 MG/ML
INJECTION INTRAMUSCULAR; INTRAVENOUS PRN
Status: DISCONTINUED | OUTPATIENT
Start: 2021-05-04 | End: 2021-05-04 | Stop reason: SDUPTHER

## 2021-05-04 RX ORDER — KETOROLAC TROMETHAMINE 30 MG/ML
INJECTION, SOLUTION INTRAMUSCULAR; INTRAVENOUS PRN
Status: DISCONTINUED | OUTPATIENT
Start: 2021-05-04 | End: 2021-05-04 | Stop reason: SDUPTHER

## 2021-05-04 RX ORDER — MORPHINE SULFATE 4 MG/ML
2 INJECTION, SOLUTION INTRAMUSCULAR; INTRAVENOUS EVERY 5 MIN PRN
Status: DISCONTINUED | OUTPATIENT
Start: 2021-05-04 | End: 2021-05-06 | Stop reason: HOSPADM

## 2021-05-04 RX ORDER — MISOPROSTOL 200 UG/1
800 TABLET ORAL PRN
Status: DISCONTINUED | OUTPATIENT
Start: 2021-05-04 | End: 2021-05-06 | Stop reason: HOSPADM

## 2021-05-04 RX ORDER — SODIUM CHLORIDE, SODIUM LACTATE, POTASSIUM CHLORIDE, CALCIUM CHLORIDE 600; 310; 30; 20 MG/100ML; MG/100ML; MG/100ML; MG/100ML
INJECTION, SOLUTION INTRAVENOUS CONTINUOUS
Status: DISCONTINUED | OUTPATIENT
Start: 2021-05-04 | End: 2021-05-06 | Stop reason: HOSPADM

## 2021-05-04 RX ORDER — SODIUM CHLORIDE, SODIUM LACTATE, POTASSIUM CHLORIDE, CALCIUM CHLORIDE 600; 310; 30; 20 MG/100ML; MG/100ML; MG/100ML; MG/100ML
INJECTION, SOLUTION INTRAVENOUS CONTINUOUS
Status: DISCONTINUED | OUTPATIENT
Start: 2021-05-04 | End: 2021-05-04 | Stop reason: SDUPTHER

## 2021-05-04 RX ORDER — HYDROMORPHONE HYDROCHLORIDE 1 MG/ML
0.5 INJECTION, SOLUTION INTRAMUSCULAR; INTRAVENOUS; SUBCUTANEOUS EVERY 5 MIN PRN
Status: DISCONTINUED | OUTPATIENT
Start: 2021-05-04 | End: 2021-05-06 | Stop reason: HOSPADM

## 2021-05-04 RX ORDER — OXYCODONE HYDROCHLORIDE AND ACETAMINOPHEN 5; 325 MG/1; MG/1
1 TABLET ORAL EVERY 4 HOURS PRN
Status: DISCONTINUED | OUTPATIENT
Start: 2021-05-04 | End: 2021-05-06 | Stop reason: HOSPADM

## 2021-05-04 RX ORDER — MORPHINE SULFATE 4 MG/ML
4 INJECTION, SOLUTION INTRAMUSCULAR; INTRAVENOUS EVERY 5 MIN PRN
Status: DISCONTINUED | OUTPATIENT
Start: 2021-05-04 | End: 2021-05-06 | Stop reason: HOSPADM

## 2021-05-04 RX ORDER — LABETALOL HYDROCHLORIDE 5 MG/ML
5 INJECTION, SOLUTION INTRAVENOUS EVERY 10 MIN PRN
Status: DISCONTINUED | OUTPATIENT
Start: 2021-05-04 | End: 2021-05-06 | Stop reason: HOSPADM

## 2021-05-04 RX ORDER — METHYLERGONOVINE MALEATE 0.2 MG/ML
200 INJECTION INTRAVENOUS PRN
Status: DISCONTINUED | OUTPATIENT
Start: 2021-05-04 | End: 2021-05-06 | Stop reason: HOSPADM

## 2021-05-04 RX ORDER — DIPHENHYDRAMINE HYDROCHLORIDE 50 MG/ML
12.5 INJECTION INTRAMUSCULAR; INTRAVENOUS
Status: ACTIVE | OUTPATIENT
Start: 2021-05-04 | End: 2021-05-04

## 2021-05-04 RX ORDER — PROPOFOL 10 MG/ML
INJECTION, EMULSION INTRAVENOUS PRN
Status: DISCONTINUED | OUTPATIENT
Start: 2021-05-04 | End: 2021-05-04 | Stop reason: SDUPTHER

## 2021-05-04 RX ORDER — FENTANYL CITRATE 50 UG/ML
INJECTION, SOLUTION INTRAMUSCULAR; INTRAVENOUS PRN
Status: DISCONTINUED | OUTPATIENT
Start: 2021-05-04 | End: 2021-05-04 | Stop reason: SDUPTHER

## 2021-05-04 RX ORDER — BISACODYL 10 MG
10 SUPPOSITORY, RECTAL RECTAL DAILY PRN
Status: DISCONTINUED | OUTPATIENT
Start: 2021-05-04 | End: 2021-05-06 | Stop reason: HOSPADM

## 2021-05-04 RX ORDER — MEPERIDINE HYDROCHLORIDE 50 MG/ML
12.5 INJECTION INTRAMUSCULAR; INTRAVENOUS; SUBCUTANEOUS EVERY 5 MIN PRN
Status: DISCONTINUED | OUTPATIENT
Start: 2021-05-04 | End: 2021-05-06 | Stop reason: HOSPADM

## 2021-05-04 RX ORDER — OXYCODONE HYDROCHLORIDE AND ACETAMINOPHEN 5; 325 MG/1; MG/1
2 TABLET ORAL EVERY 4 HOURS PRN
Status: DISCONTINUED | OUTPATIENT
Start: 2021-05-04 | End: 2021-05-06 | Stop reason: HOSPADM

## 2021-05-04 RX ORDER — DOCUSATE SODIUM 100 MG/1
100 CAPSULE, LIQUID FILLED ORAL 2 TIMES DAILY
Status: DISCONTINUED | OUTPATIENT
Start: 2021-05-04 | End: 2021-05-06 | Stop reason: HOSPADM

## 2021-05-04 RX ORDER — IBUPROFEN 400 MG/1
800 TABLET ORAL EVERY 8 HOURS
Status: DISCONTINUED | OUTPATIENT
Start: 2021-05-04 | End: 2021-05-06 | Stop reason: HOSPADM

## 2021-05-04 RX ORDER — PROMETHAZINE HYDROCHLORIDE 25 MG/ML
6.25 INJECTION, SOLUTION INTRAMUSCULAR; INTRAVENOUS
Status: ACTIVE | OUTPATIENT
Start: 2021-05-04 | End: 2021-05-04

## 2021-05-04 RX ORDER — DEXAMETHASONE SODIUM PHOSPHATE 10 MG/ML
INJECTION, SOLUTION INTRAMUSCULAR; INTRAVENOUS PRN
Status: DISCONTINUED | OUTPATIENT
Start: 2021-05-04 | End: 2021-05-04 | Stop reason: SDUPTHER

## 2021-05-04 RX ORDER — SUCCINYLCHOLINE/SOD CL,ISO/PF 100 MG/5ML
SYRINGE (ML) INTRAVENOUS PRN
Status: DISCONTINUED | OUTPATIENT
Start: 2021-05-04 | End: 2021-05-04 | Stop reason: SDUPTHER

## 2021-05-04 RX ORDER — SODIUM CHLORIDE 9 MG/ML
25 INJECTION, SOLUTION INTRAVENOUS PRN
Status: DISCONTINUED | OUTPATIENT
Start: 2021-05-04 | End: 2021-05-06 | Stop reason: HOSPADM

## 2021-05-04 RX ORDER — METOCLOPRAMIDE HYDROCHLORIDE 5 MG/ML
10 INJECTION INTRAMUSCULAR; INTRAVENOUS
Status: ACTIVE | OUTPATIENT
Start: 2021-05-04 | End: 2021-05-04

## 2021-05-04 RX ADMIN — Medication 200 MG: at 08:13

## 2021-05-04 RX ADMIN — KETOROLAC TROMETHAMINE 30 MG: 30 INJECTION, SOLUTION INTRAMUSCULAR; INTRAVENOUS at 16:49

## 2021-05-04 RX ADMIN — ONDANSETRON HYDROCHLORIDE 4 MG: 2 INJECTION, SOLUTION INTRAMUSCULAR; INTRAVENOUS at 08:29

## 2021-05-04 RX ADMIN — OXYCODONE HYDROCHLORIDE AND ACETAMINOPHEN 2 TABLET: 5; 325 TABLET ORAL at 14:04

## 2021-05-04 RX ADMIN — MORPHINE SULFATE 2 MG: 4 INJECTION, SOLUTION INTRAMUSCULAR; INTRAVENOUS at 09:06

## 2021-05-04 RX ADMIN — ENOXAPARIN SODIUM 40 MG: 40 INJECTION SUBCUTANEOUS at 21:19

## 2021-05-04 RX ADMIN — KETOROLAC TROMETHAMINE 30 MG: 30 INJECTION, SOLUTION INTRAMUSCULAR; INTRAVENOUS at 22:15

## 2021-05-04 RX ADMIN — OXYCODONE HYDROCHLORIDE AND ACETAMINOPHEN 2 TABLET: 5; 325 TABLET ORAL at 22:14

## 2021-05-04 RX ADMIN — FENTANYL CITRATE 100 MCG: 50 INJECTION, SOLUTION INTRAMUSCULAR; INTRAVENOUS at 08:18

## 2021-05-04 RX ADMIN — SODIUM CHLORIDE, POTASSIUM CHLORIDE, SODIUM LACTATE AND CALCIUM CHLORIDE: 600; 310; 30; 20 INJECTION, SOLUTION INTRAVENOUS at 05:49

## 2021-05-04 RX ADMIN — PROPOFOL 200 MG: 10 INJECTION, EMULSION INTRAVENOUS at 08:13

## 2021-05-04 RX ADMIN — MIDAZOLAM 2 MG: 1 INJECTION INTRAMUSCULAR; INTRAVENOUS at 08:18

## 2021-05-04 RX ADMIN — KETOROLAC TROMETHAMINE 30 MG: 30 INJECTION, SOLUTION INTRAMUSCULAR; INTRAVENOUS at 08:29

## 2021-05-04 RX ADMIN — Medication 1 MILLI-UNITS/MIN: at 06:05

## 2021-05-04 RX ADMIN — SODIUM CHLORIDE, PRESERVATIVE FREE 10 ML: 5 INJECTION INTRAVENOUS at 22:15

## 2021-05-04 RX ADMIN — OXYCODONE HYDROCHLORIDE AND ACETAMINOPHEN 2 TABLET: 5; 325 TABLET ORAL at 18:03

## 2021-05-04 RX ADMIN — CEFAZOLIN SODIUM 2000 MG: 1 INJECTION, POWDER, FOR SOLUTION INTRAMUSCULAR; INTRAVENOUS at 08:11

## 2021-05-04 RX ADMIN — OXYCODONE HYDROCHLORIDE AND ACETAMINOPHEN 2 TABLET: 5; 325 TABLET ORAL at 09:46

## 2021-05-04 RX ADMIN — Medication 500 ML: at 08:42

## 2021-05-04 RX ADMIN — DEXAMETHASONE SODIUM PHOSPHATE 10 MG: 10 INJECTION, SOLUTION INTRAMUSCULAR; INTRAVENOUS at 08:29

## 2021-05-04 RX ADMIN — LIDOCAINE HYDROCHLORIDE 50 MG: 10 INJECTION, SOLUTION EPIDURAL; INFILTRATION; INTRACAUDAL; PERINEURAL at 08:13

## 2021-05-04 RX ADMIN — DOCUSATE SODIUM 100 MG: 100 CAPSULE, LIQUID FILLED ORAL at 20:03

## 2021-05-04 RX ADMIN — Medication 500 ML: at 08:16

## 2021-05-04 ASSESSMENT — PAIN SCALES - GENERAL
PAINLEVEL_OUTOF10: 7
PAINLEVEL_OUTOF10: 7
PAINLEVEL_OUTOF10: 8
PAINLEVEL_OUTOF10: 7
PAINLEVEL_OUTOF10: 7
PAINLEVEL_OUTOF10: 5

## 2021-05-04 NOTE — OP NOTE
Department of Obstetrics and Gynecology   Section Note    Pt Name: Elena Branham  MRN: [id number] Acct #: [de-identified]  YOB: 1985  Procedure Performed By: Mahad Kc MD  First Assist: Edward Somers CNM    Pre-operative Diagnosis: 41 week pregnancy. Post-operative Diagnosis:  Same, Delivered, Living newbornFemale  Procedure:  primary low transverse  section  Findings:  Normal tubes, ovaries and uterus. Baby Female, Apgars  9/9 and weight of 8 lbs and 1 ounces. Estimated Blood Loss:  600ml         Specimens: None     Complications:  None         Condition: infant stable to general nursery    Indications:     Elena Branham is a 37 y.o. female  at 45w8d who presented for stat   section for cord prolapse. Procedure: The patient was taken to the Operating Room where general anesthesia was administered. She was placed in the dorsal supine position with a leftward tilt and prepped and draped. A Pfannenstiel incision was made with the scalpel taken down to the fascia. The fascia was nicked in the midline. This incision extended laterally digitally. The underlying rectus muscle dissected bluntly. The peritoneum identified and entered bluntly. This incision extended superiorly and inferior with good visualization of the bladder. The uterus was incised in a low transverse fashion and extended digitally. The vertex was removed from the uterus with fundal pressure. The nose and mouth were suctioned. The rest of the baby delivered. The cord was clamped and cut and the baby was stimulated and passed off to nursery nurse. Amniotic fluid was thick mec. Cord blood was obtained, the placenta delivered intact with a 3 vessel cord. The uterus was exteriorized, cleared of all clots and debris and repaired with #1 vicryl in a running locked fashion. A second imbricating layer of 1 chromic was used for uterine strength and bladder flap.  The uterus was placed back into the abdomen, copious irrigation took place, hemostasis was assured with Bovie cautery. The peritoneum was closed with a 0-chromic, the fascia was inspected and found to be hemostatic and closed with stratafix. The subcutaneous tissue was irrigated, made hemostatic with Bovie cautery and closed with chromic interrupted. The skin was closed with Insorb and prineo dressing. Sponge, lap and needle counts were correct x 2. The patient tolerated the procedure well.      Electronically signed by Fanta Bashir MD on 5/4/21 at 9:52 AM CDT

## 2021-05-04 NOTE — PROGRESS NOTES
dc'd little cath. 400 emptied, pt knows to call out when she has to void, for assistance to the bathroom.

## 2021-05-04 NOTE — PROGRESS NOTES
Results for Ahsan Solorzano" (MRN [id number]) as of 5/4/2021 08:34   Ref. Range 5/4/2021 08:29   Hemoglobin, Art, Extended Latest Ref Range: 12.0 - 16.0 g/dL 12.2   pH, Arterial Latest Ref Range: 7.350 - 7.450  7.230 (LL)   pCO2, Arterial Latest Ref Range: 35.0 - 45.0 mmHg 59.0 (H)   pO2, Arterial Latest Ref Range: 80.0 - 100.0 mmHg 21.0 (LL)   HCO3, Arterial Latest Ref Range: 22.0 - 26.0 mmol/L 24.7   Base Excess, Arterial Latest Ref Range: -2.0 - 2.0 mmol/L -3.6 (L)   O2 Sat, Arterial Latest Ref Range: >92 % 41.0 (LL)   O2 Content, Arterial Latest Ref Range: Not Established mL/dL 7.0   Methemoglobin, Arterial Latest Ref Range: <1.5 % 1.2   Carboxyhgb, Arterial Latest Ref Range: 0.0 - 5.0 % 1.4   Results for Ahsan Solorzano" (MRN [id number]) as of 5/4/2021 08:34    Cord arterial   Ref.  Range 5/4/2021 08:33   pH, Patrick Latest Ref Range: 7.35 - 7.45  7.35   pCO2, Patrick Latest Ref Range: 40.0 - 50.0 mmHg 39.0 (L)   pO2, Patrick Latest Ref Range: Not Established mmHg 32   HCO3, Venous Latest Ref Range: 23 - 29 mmol/L 22 (L)   Base Excess, Patrick Latest Ref Range: Not Established mmol/L -4   O2 Content, Patrick Latest Ref Range: Not Established mL/dL 11   MetHgb, Patrick Latest Ref Range: <1.5 % 1.4   O2 Sat, Patrick Latest Ref Range: Not Established % 69   Cord venous

## 2021-05-04 NOTE — PROGRESS NOTES
AROM thick mec. Cx is 4/70/-2 with cord noted beside head. Stat C/S called and I had nurse hold head off cord. Patient rolled to OR.

## 2021-05-04 NOTE — ANESTHESIA PRE PROCEDURE
Department of Anesthesiology  Preprocedure Note       Name:  Guadalupe Wilkins   Age:  37 y.o.  :  1985                                          MRN:  [id number]         Date:  2021      Surgeon: Valdemar Snider):  Andrea aDn MD    Procedure: Procedure(s):   SECTION    Medications prior to admission:   Prior to Admission medications    Medication Sig Start Date End Date Taking?  Authorizing Provider   loratadine (CLARITIN) 10 MG capsule Take 10 mg by mouth daily   Yes Historical Provider, MD   metFORMIN (GLUCOPHAGE-XR) 500 MG extended release tablet Take 2 tablets by mouth 2 times daily 20  Yes Andrea Dna MD   calcium carbonate (CALCIUM 600) 600 MG TABS tablet Take 1 tablet by mouth daily   Yes Historical Provider, MD   Prenatal MV-Min-Fe Fum-FA-DHA (PRENATAL 1 PO) Take by mouth   Yes Historical Provider, MD       Current medications:    Current Facility-Administered Medications   Medication Dose Route Frequency Provider Last Rate Last Admin    lactated ringers infusion   Intravenous Continuous Andrea Dan  mL/hr at 21 0549 New Bag at 21 0549    sodium chloride flush 0.9 % injection 10 mL  10 mL Intravenous 2 times per day Andrea Dan MD        sodium chloride flush 0.9 % injection 10 mL  10 mL Intravenous PRN Andrea Dan MD        0.9 % sodium chloride infusion  25 mL Intravenous PRN Andrea Dan MD        oxytocin (PITOCIN) 10 unit bolus from the bag  10 Units Intravenous PRN Andrea Dan MD        And    oxytocin (PITOCIN) 30 units in 500 mL infusion  87.3 doreen-units/min Intravenous Continuous PRN Andrea Dan MD        [facility] 4 mg  4 mg Intravenous Q6H PRN Andrea Dan MD        oxytocin (PITOCIN) 30 units in 500 mL infusion  1-20 doreen-units/min Intravenous Continuous Andrea Dan MD 2 mL/hr at 21 0631 2 doreen-units/min at 21 0631    butorphanol (STADOL) injection 1 mg  1 mg Intravenous injection    PRN Gwendloyn Razor, APRN - CRNA   4 mg at 05/04/21 4931    ketorolac (TORADOL) injection    PRN Gwendloyn Razor, APRN - CRNA   30 mg at 05/04/21 0835       Allergies:  No Known Allergies    Problem List:    Patient Active Problem List   Diagnosis Code    Biliary dyskinesia K82.8    Post term pregnancy at 39 weeks gestation O48.0, Z3A.41       Past Medical History:        Diagnosis Date    Esophageal reflux 10/10/2017    Gallbladder disease     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     Patient tested positive about two weeks ago from today, 09/22/16    Postpartum depression     undiagnosed    Prolactin deficiency [facility])        Past Surgical History:        Procedure Laterality Date    CHOLECYSTECTOMY, LAPAROSCOPIC N/A 10/13/2016    CHOLECYSTECTOMY LAPAROSCOPIC performed by Gray Doran MD at [facility] 69 TYMPANOSTOMY TUBE PLACEMENT         Social History:    Social History     Tobacco Use    Smoking status: Never Smoker    Smokeless tobacco: Never Used   Substance Use Topics    Alcohol use: No                                Counseling given: Not Answered      Vital Signs (Current):   Vitals:    05/04/21 0601 05/04/21 0631 05/04/21 0731 05/04/21 0800   BP: 103/66 100/71 100/76 107/77   Pulse: 98 95 80 98   Temp:       TempSrc:       Weight:       Height:                                                  BP Readings from Last 3 Encounters:   05/04/21 107/77   05/04/21 (!) 71/40   04/30/21 107/73       NPO Status: Time of last liquid consumption: 0430                        Time of last solid consumption: 0430                        Date of last liquid consumption: 05/04/21                        Date of last solid food consumption: 05/04/21    BMI:   Wt Readings from Last 3 Encounters:   05/04/21 153 lb (69.4 kg)   04/30/21 152 lb (68.9 kg)   04/27/21 152 lb (68.9 kg)     Body mass index is 26.26 kg/m².     CBC:   Lab Results   Component Value Date    WBC 7.9 05/04/2021    RBC 4.74 05/04/2021 patient. Use of blood products discussed with patient whom.                    DIANA Field - CRNA   5/4/2021

## 2021-05-04 NOTE — FLOWSHEET NOTE
Manual rupture of membranes at 0806 by Dr Efrain Fowler, cord noted immediately, staff notified and Trisha Faye to bedside to apply upward pressure to baby head off of cord in place of Dr, head of bed dropped, pt quickly transferred to OR.

## 2021-05-04 NOTE — LACTATION NOTE
Infant Name: Alma Villar  Gestation: 41.6  Day of Life: NB  Birth weight: 8-1.1 lb (3660g)  Today's weight:  Weight loss:  24 hour summary of feeds:  Voids:  Stools:  Assistive device: none  Maternal History: , mono exposure, gallbladder disease, Prolactin deficiency, Esophageal refulx, PP depression, mouth surgery tympanostomy tube placement,   Maternal Medications: metformin, claritin, calcium, PNV  Maternal Goal: one day at a time  Breast pump for home: yes, Spectra    Assisted mother with positioning and latching baby to left breast, football position. Hand expression taught, some colostrum noted. Baby had jaw dropping sucks for 15 mins. Chin and cheeks touching breast, nose free to breathe. Instructed mother to breastfeed every 2- 3 hours for 15-20 mins each side or on demand watching for hunger cues and using waking techniques when needed. 8-12 feedings in 24 hours being the goal. Hand expression and breast compressions encouraged to increase milk supply and transfer. Discussed the benefits of colostrum, skin to skin and the importance of good positioning and latch. Informed mother that baby can be very sleepy the first 24 hours and typically the 2nd night babies will be more awake and want to feed a lot and that this is normal and important in establishing milk supply. All questions at this time answered. Encouraged to call out for help with feedings.

## 2021-05-04 NOTE — H&P
OBSTETRICAL HISTORY AND PHYSICAL    Provider:  Connie De Jesus   Date: 2021  Time: 9:38 AM    Patient Name: Lillian Lopez  Patient : 1985  Room/Bed: 6656/2946-65    Primary Care Physician: Lisa Fraga MD      Chief Complaint:  Post term    HPI: Lillian Lopez is a 37 y.o.  at 41w6d weeks who presents for IOL. Contractions: Yes  Rupture Of Membranes: No  Bleeding: No    Pregnancy Risk factors:  Patient Active Problem List   Diagnosis    Biliary dyskinesia    Post term pregnancy at 39 weeks gestation    Prolapse of cord complicating labor and delivery       Allergies: Allergies as of 2021    (No Known Allergies)       Medications:  No current facility-administered medications on file prior to encounter.       Current Outpatient Medications on File Prior to Encounter   Medication Sig Dispense Refill    loratadine (CLARITIN) 10 MG capsule Take 10 mg by mouth daily      metFORMIN (GLUCOPHAGE-XR) 500 MG extended release tablet Take 2 tablets by mouth 2 times daily 360 tablet 3    calcium carbonate (CALCIUM 600) 600 MG TABS tablet Take 1 tablet by mouth daily      Prenatal MV-Min-Fe Fum-FA-DHA (PRENATAL 1 PO) Take by mouth             Past Medical History:   Diagnosis Date    Esophageal reflux 10/10/2017    Gallbladder disease     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     Patient tested positive about two weeks ago from today, 16    Postpartum depression     undiagnosed    Prolactin deficiency [facility])        Past Surgical History:   Procedure Laterality Date    CHOLECYSTECTOMY, LAPAROSCOPIC N/A 10/13/2016    CHOLECYSTECTOMY LAPAROSCOPIC performed by [facility] at [facility]      TYMPANOSTOMY TUBE PLACEMENT         OB History    Para Term  AB Living   2 1 1 0 0 1   SAB TAB Ectopic Molar Multiple Live Births   0 0 0 0 0 1      # Outcome Date GA Lbr Roverto/2nd Weight Sex Delivery Anes PTL Lv   2 Current            1 Term 18 40w2d 07:12 / 00:50 8 lb 13.5 oz (4.01 kg) F Vag-Spont EPI N JESSICA      Name: Tasha Rings: 9  Apgar5: 8       Family History   Problem Relation Age of Onset    Hypertension Mother     Diabetes Father     Hypertension Father     Alzheimer's Disease Maternal Grandmother     Diabetes Maternal Grandmother     Heart Disease Maternal Grandfather        Social History     Socioeconomic History    Marital status:      Spouse name: Not on file    Number of children: Not on file    Years of education: Not on file    Highest education level: Not on file   Occupational History    Not on file   Social Needs    Financial resource strain: Not on file    Food insecurity     Worry: Not on file     Inability: Not on file    Transportation needs     Medical: Not on file     Non-medical: Not on file   Tobacco Use    Smoking status: Never Smoker    Smokeless tobacco: Never Used   Substance and Sexual Activity    Alcohol use: No    Drug use: No    Sexual activity: Yes     Partners: Male   Lifestyle    Physical activity     Days per week: Not on file     Minutes per session: Not on file    Stress: Not on file   Relationships    Social connections     Talks on phone: Not on file     Gets together: Not on file     Attends [employer] service: Not on file     Active member of club or organization: Not on file     Attends meetings of clubs or organizations: Not on file     Relationship status: Not on file    Intimate partner violence     Fear of current or ex partner: Not on file     Emotionally abused: Not on file     Physically abused: Not on file     Forced sexual activity: Not on file   Other Topics Concern    Not on file   Social History Narrative    Not on file       Review Of Systems:  A comprehensive review of systems was negative except for what was noted in the HPI.     Physical Exam:  Vitals:    05/04/21 0859 05/04/21 0900 05/04/21 0901 05/04/21 0906   BP: 114/79 114/79 110/73 118/75   Pulse: 86 86 88 84 Resp:       Temp:       TempSrc:       SpO2:       Weight:       Height:             General appearance:  awake, alert, cooperative, no apparent distress, and appears stated age  Neurologic:  Awake, alert, oriented to name, place and time. Motor is 5 out of 5 bilaterally. Lungs:  No increased work of breathing, good air exchange, clear to auscultation bilaterally, no crackles or wheezing  Heart:  Normal apical impulse, regular rate and rhythm  Abdomen: Gravid, soft, non-tender, non-distended    Pelvic Exam:  Cervix Check:   DILATION:  4 cm   EFFACEMENT:   75%   STATION:  -2 cm   CONSISTENCY:  soft   POSITION:  mid        Lab Results:   Blood Type/Rh:    ABO/Rh   Date Value Ref Range Status   2021 O POS  Final     Antibody Screen:    Antibody Screen   Date Value Ref Range Status   2021 NEG  Final     Hemoglobin:   Hemoglobin   Date Value Ref Range Status   2021 10.7 (L) 12.0 - 16.0 g/dL Final     Hematocrit:   Hematocrit   Date Value Ref Range Status   2021 35.7 (L) 37.0 - 47.0 % Final     Platelet Count:   Platelets   Date Value Ref Range Status   2021 170 130 - 400 K/uL Final       Assessment:  Celestina Holstein is a 37 y.o. female   At [address] [place] [address] at post term    Plan:  Admit for delivery  Pain management  GBS Prophylaxis  Risks, benefits, alternatives and possible complications have been discussed in detail with the patient. Admission, and post admission procedures and expectations were discussed in detail. All questions were answered.       Carlota Thompson MD

## 2021-05-04 NOTE — ANESTHESIA POSTPROCEDURE EVALUATION
Department of Anesthesiology  Postprocedure Note    Patient: Joshua Enriquez  MRN: [id number]  YOB: 1985  Date of evaluation: 2021  Time:  8:57 AM     Procedure Summary     Date: 21 Room / Location: [facility]    Anesthesia Start: 174 Anesthesia Stop:     Procedure:  SECTION (N/A Abdomen) Diagnosis: (Cord Prolapse)    Surgeons: Stephanie Mary MD Responsible Provider: DIANA Moura CRNA    Anesthesia Type: general ASA Status: 2 - Emergent          Anesthesia Type: No value filed. Schuyler Phase I:      Schuylre Phase II:      Last vitals: Reviewed and per EMR flowsheets.        Anesthesia Post Evaluation    Patient location during evaluation: PACU  Patient participation: waiting for patient participation  Airway patency: patent  Nausea & Vomiting: no nausea and no vomiting  Complications: no  Cardiovascular status: hemodynamically stable  Respiratory status: face mask, oral airway, acceptable and spontaneous ventilation  Hydration status: euvolemic

## 2021-05-05 LAB
BASOPHILS ABSOLUTE: 0.1 K/UL (ref 0–0.2)
BASOPHILS RELATIVE PERCENT: 0.4 % (ref 0–1)
EOSINOPHILS ABSOLUTE: 0.3 K/UL (ref 0–0.6)
EOSINOPHILS RELATIVE PERCENT: 2.2 % (ref 0–5)
HCT VFR BLD CALC: 30.3 % (ref 37–47)
HEMOGLOBIN: 9.1 G/DL (ref 12–16)
IMMATURE GRANULOCYTES #: 0.1 K/UL
LYMPHOCYTES ABSOLUTE: 2.4 K/UL (ref 1.1–4.5)
LYMPHOCYTES RELATIVE PERCENT: 17.9 % (ref 20–40)
MCH RBC QN AUTO: 22.8 PG (ref 27–31)
MCHC RBC AUTO-ENTMCNC: 30 G/DL (ref 33–37)
MCV RBC AUTO: 75.9 FL (ref 81–99)
MONOCYTES ABSOLUTE: 1.1 K/UL (ref 0–0.9)
MONOCYTES RELATIVE PERCENT: 8.3 % (ref 0–10)
NEUTROPHILS ABSOLUTE: 9.5 K/UL (ref 1.5–7.5)
NEUTROPHILS RELATIVE PERCENT: 70.7 % (ref 50–65)
PDW BLD-RTO: 17.2 % (ref 11.5–14.5)
PLATELET # BLD: 198 K/UL (ref 130–400)
PMV BLD AUTO: 9.6 FL (ref 9.4–12.3)
RBC # BLD: 3.99 M/UL (ref 4.2–5.4)
WBC # BLD: 13.5 K/UL (ref 4.8–10.8)

## 2021-05-05 PROCEDURE — 1220000000 HC SEMI PRIVATE OB R&B

## 2021-05-05 PROCEDURE — 85025 COMPLETE CBC W/AUTO DIFF WBC: CPT

## 2021-05-05 PROCEDURE — 6370000000 HC RX 637 (ALT 250 FOR IP): Performed by: OBSTETRICS & GYNECOLOGY

## 2021-05-05 PROCEDURE — 99024 POSTOP FOLLOW-UP VISIT: CPT | Performed by: NURSE PRACTITIONER

## 2021-05-05 PROCEDURE — 36415 COLL VENOUS BLD VENIPUNCTURE: CPT

## 2021-05-05 RX ADMIN — OXYCODONE HYDROCHLORIDE AND ACETAMINOPHEN 1 TABLET: 5; 325 TABLET ORAL at 21:36

## 2021-05-05 RX ADMIN — OXYCODONE HYDROCHLORIDE AND ACETAMINOPHEN 2 TABLET: 5; 325 TABLET ORAL at 12:44

## 2021-05-05 RX ADMIN — DOCUSATE SODIUM 100 MG: 100 CAPSULE, LIQUID FILLED ORAL at 20:18

## 2021-05-05 RX ADMIN — PRENATAL VIT W/ FE FUMARATE-FA TAB 27-0.8 MG 1 TABLET: 27-0.8 TAB at 08:51

## 2021-05-05 RX ADMIN — IBUPROFEN 800 MG: 400 TABLET ORAL at 14:37

## 2021-05-05 RX ADMIN — IBUPROFEN 800 MG: 400 TABLET ORAL at 06:19

## 2021-05-05 RX ADMIN — IBUPROFEN 800 MG: 400 TABLET ORAL at 23:07

## 2021-05-05 RX ADMIN — OXYCODONE HYDROCHLORIDE AND ACETAMINOPHEN 2 TABLET: 5; 325 TABLET ORAL at 02:02

## 2021-05-05 RX ADMIN — OXYCODONE HYDROCHLORIDE AND ACETAMINOPHEN 1 TABLET: 5; 325 TABLET ORAL at 17:08

## 2021-05-05 RX ADMIN — OXYCODONE HYDROCHLORIDE AND ACETAMINOPHEN 2 TABLET: 5; 325 TABLET ORAL at 06:19

## 2021-05-05 RX ADMIN — DOCUSATE SODIUM 100 MG: 100 CAPSULE, LIQUID FILLED ORAL at 08:51

## 2021-05-05 ASSESSMENT — PAIN SCALES - GENERAL
PAINLEVEL_OUTOF10: 3
PAINLEVEL_OUTOF10: 4
PAINLEVEL_OUTOF10: 2
PAINLEVEL_OUTOF10: 4
PAINLEVEL_OUTOF10: 5
PAINLEVEL_OUTOF10: 4
PAINLEVEL_OUTOF10: 4

## 2021-05-05 NOTE — PROGRESS NOTES
Postpartum Day 1:  Delivery    Patient is a [id number] that delivered on 2021. The patient feels well. The patient denies emotional concerns. Pain is well controlled with current medications. The baby iswell. Baby is feeding via breast. Urinary output is adequate. The patient is ambulating well. The patient is tolerating a normal diet. Flatus has been passed. Objective:      Vitals:    21 0600   BP: 103/70   Pulse: 83   Resp: 16   Temp: 98.1 °F (36.7 °C)   SpO2: 96%         General:    alert, appears stated age and cooperative   Bowel Sounds:  active   Lochia:  appropriate   Uterine Fundus:   firm   Incision:  healing well, no significant drainage, no dehiscence, no significant erythema   DVT Evaluation:  No evidence of DVT seen on physical exam.     Lab Results   Component Value Date    WBC 13.5 (H) 2021    HGB 9.1 (L) 2021    HCT 30.3 (L) 2021    MCV 75.9 (L) 2021     2021     Assessment:     Status post  section. Doing well postoperatively. Plan:     Continue current care.

## 2021-05-05 NOTE — LACTATION NOTE
Infant Name: Allegra Christina  Gestation: 41.6  Day of Life: 1  Birth weight: 8-1.1 lb (3660g)  Today's weight: 7-11 lb (3487g)  Weight loss: -4.73%  24 hour summary of feeds: breastfeeding x 6  Voids: 2  Stools: 2  Assistive device: none  Maternal History: , mono exposure, gallbladder disease, Prolactin deficiency, Esophageal refulx, PP depression, mouth surgery tympanostomy tube placement,   Maternal Medications: metformin, claritin, calcium, PNV  Maternal Goal: one day at a time  Breast pump for home: yes, Spectra    Mother states breastfeeding is going ok, states concerns about a deep latch. Encouraged mother to watch,  [address] Tube Video, \"Attaching Your Baby to the Breast\", to make sure mother is aware of what a deep effective latch looks like and how to achieve one. Instructed mother to breastfeed every 2- 3 hours for 15-20 mins each side or on demand watching for hunger cues and using waking techniques when needed. 8-12 feedings in 24 hours being the goal. Hand expression and breast compressions encouraged to increase milk supply and transfer. Reminded mother about supply and demand. Encouraged to call out for help with feedings when needed. Mother states she will call out with next feeding for assistance. 1 Elias Vargas Assisted mother with positioning and latching baby to left breast, football position. Reminded mother about getting baby in a good position, sandwiching her breast, point nipple to nose, waiting for wide open mouth and acting quick to latch baby. Some jaw dropping sucks noted, baby sleepy. Encouraged gentle stimulation when baby is latched. Baby fed for about 10 mins, baby got the hiccups. Encouraged mother again to call out if needed.

## 2021-05-06 VITALS
HEIGHT: 64 IN | BODY MASS INDEX: 26.12 KG/M2 | DIASTOLIC BLOOD PRESSURE: 61 MMHG | RESPIRATION RATE: 16 BRPM | WEIGHT: 153 LBS | SYSTOLIC BLOOD PRESSURE: 99 MMHG | HEART RATE: 73 BPM | TEMPERATURE: 97.8 F | OXYGEN SATURATION: 97 %

## 2021-05-06 LAB — RPR: NORMAL

## 2021-05-06 PROCEDURE — 99024 POSTOP FOLLOW-UP VISIT: CPT | Performed by: NURSE PRACTITIONER

## 2021-05-06 PROCEDURE — 6370000000 HC RX 637 (ALT 250 FOR IP): Performed by: OBSTETRICS & GYNECOLOGY

## 2021-05-06 RX ORDER — IBUPROFEN 800 MG/1
800 TABLET ORAL EVERY 8 HOURS PRN
Qty: 30 TABLET | Refills: 0 | Status: SHIPPED | OUTPATIENT
Start: 2021-05-06 | End: 2021-06-16

## 2021-05-06 RX ORDER — PSEUDOEPHEDRINE HCL 30 MG
100 TABLET ORAL 2 TIMES DAILY
Qty: 30 CAPSULE | Refills: 0 | Status: SHIPPED | OUTPATIENT
Start: 2021-05-06 | End: 2021-06-16

## 2021-05-06 RX ORDER — OXYCODONE HYDROCHLORIDE AND ACETAMINOPHEN 5; 325 MG/1; MG/1
1 TABLET ORAL EVERY 4 HOURS PRN
Qty: 18 TABLET | Refills: 0 | Status: SHIPPED | OUTPATIENT
Start: 2021-05-06 | End: 2021-05-09

## 2021-05-06 RX ADMIN — DOCUSATE SODIUM 100 MG: 100 CAPSULE, LIQUID FILLED ORAL at 07:35

## 2021-05-06 RX ADMIN — OXYCODONE HYDROCHLORIDE AND ACETAMINOPHEN 1 TABLET: 5; 325 TABLET ORAL at 07:35

## 2021-05-06 RX ADMIN — OXYCODONE HYDROCHLORIDE AND ACETAMINOPHEN 1 TABLET: 5; 325 TABLET ORAL at 01:40

## 2021-05-06 RX ADMIN — PRENATAL VIT W/ FE FUMARATE-FA TAB 27-0.8 MG 1 TABLET: 27-0.8 TAB at 07:35

## 2021-05-06 RX ADMIN — IBUPROFEN 800 MG: 400 TABLET ORAL at 07:35

## 2021-05-06 RX ADMIN — OXYCODONE HYDROCHLORIDE AND ACETAMINOPHEN 1 TABLET: 5; 325 TABLET ORAL at 11:54

## 2021-05-06 ASSESSMENT — PAIN SCALES - GENERAL
PAINLEVEL_OUTOF10: 5
PAINLEVEL_OUTOF10: 4
PAINLEVEL_OUTOF10: 1
PAINLEVEL_OUTOF10: 1
PAINLEVEL_OUTOF10: 4

## 2021-05-06 NOTE — LACTATION NOTE
Infant Name: Oren Chung  Gestation: 41.6  Day of Life: 2  Birth weight: 8-1.1 lb (3660g)  Yesterday's weight:  7-11 lb (3487g)  Today's weight: 7-7.4 lb (3385g)  Weight loss: -8%  24 hour summary of feeds: breastfeeding x 8  Voids: 3  Stools: 3  Assistive device: none  Maternal History: , mono exposure, gallbladder disease, Prolactin deficiency, Esophageal refulx, PP depression, mouth surgery tympanostomy tube placement,   Maternal Medications: metformin, claritin, calcium, PNV  Maternal Goal: one day at a time  Breast pump for home: yes, Spectra    Mother states breastfeeding is going better, states she did watch the [address] [place] [address] Dr video and feels baby is getting a better latch now and breastfeeding doesn't hurt. Encouraged mother to start pumping, mother state she has a Spectra pump and will start pump. Instructions for set up and cleaning given. Instructed to breastfeed every 2-3 hours for 15-20 mins each side or on demand. Hand expression and breast compression encouraged to increase milk transfer while breastfeeding and pumping. Instructed to pump for 15 mins after breastfeeding, giving baby every drop of colostrum/EBM up to 2 oz. Mother and baby will be discharged today. Weight check scheduled for 2 days. Instructions and handouts given over management of sore nipples, engorgement, plugged ducts, mastitis, hydration, nutrition, and medications that could effect milk supply. Mother knows when to call MD if needed. All questions and concerns answered at this time. Lactation number provided.

## 2021-05-06 NOTE — DISCHARGE SUMMARY
Postpartum Day 2:  Delivery     Patient is a [id number] that delivered on The patient feels well. The patient denies emotional concerns. Pain is well controlled with current medications. The baby iswell. Baby is feeding via breast. Urinary output is adequate. The patient is ambulating well. The patient is tolerating a normal diet. Flatus has been passed.     Objective:      Patient Vitals for the past 8 hrs:      Vitals:     21 0610   BP: 99/61   Pulse: 73   Resp: 16   Temp: 97.8 °F (36.6 °C)   SpO2: 97%         General:    alert, appears stated age and cooperative   Bowel Sounds:  active   Lochia:  appropriate   Uterine Fundus:   firm   Incision:  healing well, no significant drainage, no dehiscence, no significant erythema   DVT Evaluation:  No evidence of DVT seen on physical exam.            Lab Results   Component Value Date     WBC 13.5 (H) 2021     HGB 9.1 (L) 2021     HCT 30.3 (L) 2021     MCV 75.9 (L) 2021      2021      Assessment:      Status post  section.  Doing well postoperatively.      Plan:      Continue current care.

## 2021-05-06 NOTE — PROGRESS NOTES
Postpartum Day 2:  Delivery    Patient is a [id number] that delivered on The patient feels well. The patient denies emotional concerns. Pain is well controlled with current medications. The baby iswell. Baby is feeding via breast. Urinary output is adequate. The patient is ambulating well. The patient is tolerating a normal diet. Flatus has been passed. Objective:      Patient Vitals for the past 8 hrs:  Vitals:    21 0610   BP: 99/61   Pulse: 73   Resp: 16   Temp: 97.8 °F (36.6 °C)   SpO2: 97%       General:    alert, appears stated age and cooperative   Bowel Sounds:  active   Lochia:  appropriate   Uterine Fundus:   firm   Incision:  healing well, no significant drainage, no dehiscence, no significant erythema   DVT Evaluation:  No evidence of DVT seen on physical exam.     Lab Results   Component Value Date    WBC 13.5 (H) 2021    HGB 9.1 (L) 2021    HCT 30.3 (L) 2021    MCV 75.9 (L) 2021     2021     Assessment:     Status post  section. Doing well postoperatively. Plan:     Continue current care.

## 2021-05-14 ENCOUNTER — TELEMEDICINE (OUTPATIENT)
Dept: OBGYN CLINIC | Age: 36
End: 2021-05-14

## 2021-05-14 DIAGNOSIS — Z98.891 HISTORY OF CESAREAN SECTION, LOW TRANSVERSE: ICD-10-CM

## 2021-05-14 DIAGNOSIS — Z30.02 COUNSELING IN NATURAL FAMILY PLANNING TO AVOID PREGNANCY: ICD-10-CM

## 2021-05-14 PROCEDURE — 0503F POSTPARTUM CARE VISIT: CPT | Performed by: NURSE PRACTITIONER

## 2021-05-14 NOTE — PROGRESS NOTES
[address] [facility]  CNM Office Note  TELEHEALTH EVALUATION -- Audio/Visual (During MPMKS-01 public health emergency)    Annemarie Bumpers is a 37 y.o. female who presents today for her medical conditions/ complaints as noted below. No chief complaint on file. HPI  10 days PP  section doing well, exclusively pumping without problems. A little weepy with emotions but feels better than she did with her last pregnancy. Incision healing well. Period like bleeding- light. Patient Active Problem List   Diagnosis    Biliary dyskinesia    Post term pregnancy at 39 weeks gestation    Prolapse of cord complicating labor and delivery       Patient's last menstrual period was 07/15/2020 (exact date).   [id number]    Past Medical History:   Diagnosis Date    Esophageal reflux 10/10/2017    Gallbladder disease     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     Patient tested positive about two weeks ago from today, 16    Postpartum depression     undiagnosed    Prolactin deficiency [facility])      Past Surgical History:   Procedure Laterality Date     SECTION N/A 2021     SECTION performed by [facility] at [facility]    CHOLECYSTECTOMY, LAPAROSCOPIC N/A 10/13/2016    CHOLECYSTECTOMY LAPAROSCOPIC performed by Bill [facility] at [facility]      TYMPANOSTOMY TUBE PLACEMENT       Family History   Problem Relation Age of Onset    Hypertension Mother     Diabetes Father     Hypertension Father     Alzheimer's Disease Maternal Grandmother     Diabetes Maternal Grandmother     Heart Disease Maternal Grandfather      Social History     Tobacco Use    Smoking status: Never Smoker    Smokeless tobacco: Never Used   Substance Use Topics    Alcohol use: No       Current Outpatient Medications   Medication Sig Dispense Refill    ibuprofen (ADVIL;MOTRIN) 800 MG tablet Take 1 tablet by mouth every 8 hours as needed for Pain 30 tablet 0    docusate sodium (COLACE, DULCOLAX) 100 MG CAPS Take 100 mg by mouth 2 times daily 30 capsule 0    Prenatal MV-Min-Fe Fum-FA-DHA (PRENATAL 1 PO) Take by mouth       No current facility-administered medications for this visit. No Known Allergies  There were no vitals filed for this visit. There is no height or weight on file to calculate BMI. Review of Systems   Constitutional: Positive for fatigue. HENT: Negative. Eyes: Negative. Respiratory: Negative. Cardiovascular: Negative. Gastrointestinal: Negative. Abdominal pain: tenderness at incision. Endocrine: Negative. Genitourinary: Negative. Pelvic pain: incisional    Musculoskeletal: Negative. Skin: Negative. Allergic/Immunologic: Negative. Neurological: Negative. Hematological: Negative. Psychiatric/Behavioral: Positive for sleep disturbance. Baby blues vs depression        Due to this being a TeleHealth encounter, evaluation of the following organ systems is limited: Vitals/Constitutional/EENT/Resp/CV/GI//MS/Neuro/Skin/Heme-Lymph-Imm. Physical Exam  Constitutional:       Appearance: She is well-developed. She is not diaphoretic. HENT:      Head: Normocephalic and atraumatic. Nose: Nose normal.   Eyes:      Conjunctiva/sclera: Conjunctivae normal.      Pupils: Pupils are equal, round, and reactive to light. Neck:      Thyroid: No thyromegaly. Trachea: No tracheal deviation. Pulmonary:      Effort: Pulmonary effort is normal. No respiratory distress. Abdominal:      Comments: Incision appears to be healing well, C/D/I without any s/s of infection. Musculoskeletal:         General: Normal range of motion. Cervical back: Normal range of motion and neck supple. Comments: Normal ROM for upper and lower extremities. Gait steady. Skin:     Findings: No lesion or rash. Neurological:      Mental Status: She is alert and oriented to person, place, and time.    Psychiatric:         Speech: Speech normal.         Behavior: Behavior normal.          Diagnosis Orders   1. Encounter for postpartum care of lactating mother     2. History of  section, low transverse     3. Counseling in natural family planning to avoid pregnancy         MEDICATIONS:  No orders of the defined types were placed in this encounter. ORDERS:  No orders of the defined types were placed in this encounter. PLAN:  1. PP- f/u in 4wks in office visit  2. Natural Family Planning- discussed hx of infertility and changes following pregnancy/delivery with pt understanding. Over 50% of the total visit time of 20 minutes was spent on counseling and/or coordination of care of postpartum care following c/s. All questions were answered and the patient voiced understanding. Pursuant to the emergency declaration under the [facility], 1135 waiver authority and the [employer] Act, this Virtual  Visit was conducted, with patient's consent, to reduce the patient's risk of exposure to COVID-19 and provide continuity of care for an established patient. Services were provided through a video synchronous discussion virtually to substitute for in-person clinic visit.

## 2021-05-16 ASSESSMENT — ENCOUNTER SYMPTOMS
GASTROINTESTINAL NEGATIVE: 1
RESPIRATORY NEGATIVE: 1
ALLERGIC/IMMUNOLOGIC NEGATIVE: 1
EYES NEGATIVE: 1

## 2021-05-19 ENCOUNTER — TELEPHONE (OUTPATIENT)
Dept: OBGYN CLINIC | Age: 36
End: 2021-05-19

## 2021-05-25 ENCOUNTER — TELEPHONE (OUTPATIENT)
Dept: OBGYN CLINIC | Age: 36
End: 2021-05-25

## 2021-05-25 NOTE — TELEPHONE ENCOUNTER
Returned call to pt. Pt states she called billing and her insurance company and they told her we needed to resubmit her claim with the proper TAX ID #. I explained that our tax ID number changed as of January 1st, and that we have had issues with this. I forwarded her to office manage, Nato Giang's . Pt v/u.

## 2021-05-27 ENCOUNTER — TELEPHONE (OUTPATIENT)
Dept: OBGYN CLINIC | Age: 36
End: 2021-05-27

## 2021-05-27 NOTE — TELEPHONE ENCOUNTER
Patient called the nurse voicemail in regards to a message that she left for  on Tuesday regarding a billing issue. Patient is requesting a call back from Fransisca Nguyen, .

## 2021-06-16 ENCOUNTER — POSTPARTUM VISIT (OUTPATIENT)
Dept: OBGYN CLINIC | Age: 36
End: 2021-06-16

## 2021-06-16 VITALS
SYSTOLIC BLOOD PRESSURE: 103 MMHG | DIASTOLIC BLOOD PRESSURE: 66 MMHG | HEIGHT: 64 IN | WEIGHT: 131 LBS | TEMPERATURE: 98.7 F | HEART RATE: 75 BPM | BODY MASS INDEX: 22.36 KG/M2

## 2021-06-16 PROCEDURE — 0503F POSTPARTUM CARE VISIT: CPT | Performed by: OBSTETRICS & GYNECOLOGY

## 2021-06-16 RX ORDER — LORATADINE 10 MG/1
10 CAPSULE, LIQUID FILLED ORAL DAILY
Status: ON HOLD | COMMUNITY
End: 2022-10-27 | Stop reason: HOSPADM

## 2021-06-16 ASSESSMENT — ENCOUNTER SYMPTOMS
EYES NEGATIVE: 1
RESPIRATORY NEGATIVE: 1
GASTROINTESTINAL NEGATIVE: 1

## 2021-06-16 NOTE — PATIENT INSTRUCTIONS
Patient Education         Section: What to Expect at Home  Your Recovery     A  section, or , is surgery to deliver your baby through a cut that the doctor makes in your lower belly and uterus. The cut is called an incision. You may have some pain in your lower belly and need pain medicine for 1 to 2 weeks. You can expect some vaginal bleeding for several weeks. You will probably need about 6 weeks to fully recover. It's important to take it easy while the incision heals. Avoid heavy lifting, strenuous activities, and exercises that strain the belly muscles while you recover. Ask a family member or friend for help with housework, cooking, and shopping. This care sheet gives you a general idea about how long it will take for you to recover. But each person recovers at a different pace. Follow the steps below to get better as quickly as possible. How can you care for yourself at home? Activity    · Rest when you feel tired. Getting enough sleep will help you recover.     · Try to walk each day. Start by walking a little more than you did the day before. Bit by bit, increase the amount you walk. Walking boosts blood flow and helps prevent pneumonia, constipation, and blood clots.     · Avoid strenuous activities, such as bicycle riding, jogging, weightlifting, and aerobic exercise, for 6 weeks or until your doctor says it is okay.     · Until your doctor says it is okay, do not lift anything heavier than your baby.     · Do not do sit-ups or other exercises that strain the belly muscles for 6 weeks or until your doctor says it is okay.     · Hold a pillow over your incision when you cough or take deep breaths. This will support your belly and decrease your pain.     · You may shower as usual. Pat the incision dry when you are done.     · You will have some vaginal bleeding. Wear sanitary pads.  Do not douche or use tampons until your doctor says it is okay.     · Ask your doctor when you over-the-counter medicine.     · If you think your pain medicine is making you sick to your stomach:  ? Take your medicine after meals (unless your doctor has told you not to). ? Ask your doctor for a different pain medicine.     · If your doctor prescribed antibiotics, take them as directed. Do not stop taking them just because you feel better. You need to take the full course of antibiotics. Incision care    · If you have strips of tape on the incision, leave the tape on for a week or until it falls off.     · Wash the area daily with warm, soapy water, and pat it dry. Don't use hydrogen peroxide or alcohol, which can slow healing. You may cover the area with a gauze bandage if it weeps or rubs against clothing. Change the bandage every day.     · Keep the area clean and dry. Other instructions    · If you breastfeed your baby, you may be more comfortable while you are healing if you place the baby so that he or she is not resting on your belly. Try tucking your baby under your arm, with his or her body along the side you will be feeding on. Support your baby's upper body with your arm. With that hand you can control your baby's head to bring his or her mouth to your breast. This is sometimes called the football hold. Follow-up care is a key part of your treatment and safety. Be sure to make and go to all appointments, and call your doctor if you are having problems. It's also a good idea to know your test results and keep a list of the medicines you take. When should you call for help? Call 911  anytime you think you may need emergency care. For example, call if:    · You have thoughts of harming yourself, your baby, or another person.     · You passed out (lost consciousness).     · You have chest pain, are short of breath, or cough up blood.     · You have a seizure.    Call your doctor now or seek immediate medical care if:    · You have pain that does not get better after you take pain medicine.     · You have severe vaginal bleeding.     · You are dizzy or lightheaded, or you feel like you may faint.     · You have new or worse pain in your belly or pelvis.     · You have loose stitches, or your incision comes open.     · You have symptoms of infection, such as:  ? Increased pain, swelling, warmth, or redness. ? Red streaks leading from the incision. ? Pus draining from the incision. ? A fever.     · You have symptoms of a blood clot in your leg (called a deep vein thrombosis), such as:  ? Pain in your calf, back of the knee, thigh, or groin. ? Redness and swelling in your leg or groin.     · You have signs of preeclampsia, such as:  ? Sudden swelling of your face, hands, or feet. ? New vision problems (such as dimness, blurring, or seeing spots). ? A severe headache. Watch closely for changes in your health, and be sure to contact your doctor if:    · You do not get better as expected. Where can you learn more? Go to https://Measurement Analytics.Anesco. org and sign in to your [facility] account. Enter M806 in the [employer] box to learn more about \" Section: What to Expect at Home. \"     If you do not have an account, please click on the \"Sign Up Now\" link. Current as of: 2020               Content Version: 12.9  © 0096-6615 Healthwise, Incorporated. Care instructions adapted under license by [facility] ([facility]). If you have questions about a medical condition or this instruction, always ask your healthcare professional. [place] [address] any warranty or liability for your use of this information.

## 2021-06-16 NOTE — PROGRESS NOTES
I, Gale Borjas RN, am scribing for and in the presence of Dr. Kyung Martin 2021/2:44 PM/sign             Kim Sy (:  1985) is a 37 y.o. female, here for a 6 week postpartum check up. She had a  on 21 due to prolapse cord. Baby is doing well. She is breastfeeding. Denies any PPD. Voiding well and having normal bowel movements. She is eating a regular diet. Patient Active Problem List   Diagnosis    Biliary dyskinesia    Post term pregnancy at 39 weeks gestation    Prolapse of cord complicating labor and delivery       Review of Systems   Constitutional: Negative. HENT: Negative. Eyes: Negative. Respiratory: Negative. Cardiovascular: Negative. Gastrointestinal: Negative. Genitourinary: Negative for difficulty urinating, dyspareunia, dysuria, enuresis, frequency, hematuria, menstrual problem, pelvic pain, urgency and vaginal discharge. Musculoskeletal: Negative. Skin: Negative. Neurological: Negative. Psychiatric/Behavioral: Negative. Prior to Visit Medications    Medication Sig Taking?  Authorizing Provider   loratadine (CLARITIN) 10 MG capsule Take 10 mg by mouth daily Yes Historical Provider, MD   CALCIUM PO Take by mouth Yes Historical Provider, MD   Prenatal MV-Min-Fe Fum-FA-DHA (PRENATAL 1 PO) Take by mouth Yes Historical Provider, MD        No Known Allergies    Past Medical History:   Diagnosis Date    Esophageal reflux 10/10/2017    Gallbladder disease     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     Patient tested positive about two weeks ago from today, 16    Postpartum depression     undiagnosed    Prolactin deficiency [facility])        Past Surgical History:   Procedure Laterality Date     SECTION N/A 2021     SECTION performed by Tommy Lozano MD at [facility]    CHOLECYSTECTOMY, LAPAROSCOPIC N/A 10/13/2016    CHOLECYSTECTOMY LAPAROSCOPIC performed by Alanis Bustamante MD at [place] [address] TYMPANOSTOMY TUBE PLACEMENT         Social History     Socioeconomic History    Marital status:      Spouse name: Not on file    Number of children: Not on file    Years of education: Not on file    Highest education level: Not on file   Occupational History    Not on file   Tobacco Use    Smoking status: Never Smoker    Smokeless tobacco: Never Used   Vaping Use    Vaping Use: Never used   Substance and Sexual Activity    Alcohol use: No    Drug use: No    Sexual activity: Yes     Partners: Male   Other Topics Concern    Not on file   Social History Narrative    Not on file     Social Determinants of Health     Financial Resource Strain:     Difficulty of Paying Living Expenses:    Food Insecurity:     Worried About Running Out of Food in the Last Year:     [address] in the Last Year:    Transportation Needs:     Lack of Transportation (Medical):      Lack of Transportation (Non-Medical):    Physical Activity:     Days of Exercise per Week:     Minutes of Exercise per Session:    Stress:     Feeling of Stress :    Social Connections:     Frequency of Communication with Friends and Family:     Frequency of Social Gatherings with Friends and Family:     Attends Jewish Services:     Active Member of Clubs or Organizations:     Attends Club or Organization Meetings:     Marital Status:    Intimate Partner Violence:     Fear of Current or Ex-Partner:     Emotionally Abused:     Physically Abused:     Sexually Abused:         Family History   Problem Relation Age of Onset    Hypertension Mother     Diabetes Father     Hypertension Father     Alzheimer's Disease Maternal Grandmother     Diabetes Maternal Grandmother     Heart Disease Maternal Grandfather        ADVANCE DIRECTIVE: N, <no information>    Vitals:    06/16/21 1401   BP: 103/66   Site: Right Upper Arm   Position: Sitting   Cuff Size: Medium Adult   Pulse: 75   Temp: 98.7 °F (37.1 °C)   TempSrc: Temporal

## 2021-08-27 NOTE — PATIENT INSTRUCTIONS
Patient Education        Breast Self-Exam: Care Instructions  Your Care Instructions     A breast self-exam is when you check your breasts for lumps or changes. This regular exam helps you learn how your breasts normally look and feel. Most breast problems or changes are not because of cancer. Breast self-exam is not a substitute for a mammogram. Having regular breast exams by your doctor and regular mammograms improve your chances of finding any problems with your breasts. Some women set a time each month to do a step-by-step breast self-exam. Other women like a less formal system. They might look at their breasts as they brush their teeth, or feel their breasts once in a while in the shower. If you notice a change in your breast, tell your doctor. Follow-up care is a key part of your treatment and safety. Be sure to make and go to all appointments, and call your doctor if you are having problems. It's also a good idea to know your test results and keep a list of the medicines you take. How do you do a breast self-exam?  · The best time to examine your breasts is usually one week after your menstrual period begins. Your breasts should not be tender then. If you do not have periods, you might do your exam on a day of the month that is easy to remember. · To examine your breasts:  ? Remove all your clothes above the waist and lie down. When you are lying down, your breast tissue spreads evenly over your chest wall, which makes it easier to feel all your breast tissue. ? Use the pads--not the fingertips--of the 3 middle fingers of your left hand to check your right breast. Move your fingers slowly in small coin-sized circles that overlap. ? Use three levels of pressure to feel of all your breast tissue. Use light pressure to feel the tissue close to the skin surface. Use medium pressure to feel a little deeper. Use firm pressure to feel your tissue close to your breastbone and ribs.  Use each pressure level to feel your breast tissue before moving on to the next spot. ? Check your entire breast, moving up and down as if following a strip from the collarbone to the bra line, and from the armpit to the ribs. Repeat until you have covered the entire breast.  ? Repeat this procedure for your left breast, using the pads of the 3 middle fingers of your right hand. · To examine your breasts while in the shower:  ? Place one arm over your head and lightly soap your breast on that side. ? Using the pads of your fingers, gently move your hand over your breast (in the strip pattern described above), feeling carefully for any lumps or changes. ? Repeat for the other breast.  · Have your doctor inspect anything you notice to see if you need further testing. Where can you learn more? Go to https://Leap In Entertainmentjennifereb.MaXware. org and sign in to your [employer] account. Enter P148 in the Admify box to learn more about \"Breast Self-Exam: Care Instructions. \"     If you do not have an account, please click on the \"Sign Up Now\" link. Current as of: December 17, 2020               Content Version: 12.9  © 8757-8036 GreenItaly1. Care instructions adapted under license by [facility] ([facility]). If you have questions about a medical condition or this instruction, always ask your healthcare professional. [address] any warranty or liability for your use of this information. Patient Education        Body Mass Index: Care Instructions  Your Care Instructions     Body mass index (BMI) can help you see if your weight is raising your risk for health problems. It uses a formula to compare how much you weigh with how tall you are. · A BMI lower than 18.5 is considered underweight. · A BMI between 18.5 and 24.9 is considered healthy. · A BMI between 25 and 29.9 is considered overweight. A BMI of 30 or higher is considered obese.   If your BMI is in the normal range, it means that you have a lower risk for weight-related health problems. If your BMI is in the overweight or obese range, you may be at increased risk for weight-related health problems, such as high blood pressure, heart disease, stroke, arthritis or joint pain, and diabetes. If your BMI is in the underweight range, you may be at increased risk for health problems such as fatigue, lower protection (immunity) against illness, muscle loss, bone loss, hair loss, and hormone problems. BMI is just one measure of your risk for weight-related health problems. You may be at higher risk for health problems if you are not active, you eat an unhealthy diet, or you drink too much alcohol or use tobacco products. Follow-up care is a key part of your treatment and safety. Be sure to make and go to all appointments, and call your doctor if you are having problems. It's also a good idea to know your test results and keep a list of the medicines you take. How can you care for yourself at home? · Practice healthy eating habits. This includes eating plenty of fruits, vegetables, whole grains, lean protein, and low-fat dairy. · If your doctor recommends it, get more exercise. Walking is a good choice. Bit by bit, increase the amount you walk every day. Try for at least 30 minutes on most days of the week. · Do not smoke. Smoking can increase your risk for health problems. If you need help quitting, talk to your doctor about stop-smoking programs and medicines. These can increase your chances of quitting for good. · Limit alcohol to 2 drinks a day for men and 1 drink a day for women. Too much alcohol can cause health problems. If you have a BMI higher than 25  · Your doctor may do other tests to check your risk for weight-related health problems. This may include measuring the distance around your waist. A waist measurement of more than 40 inches in men or 35 inches in women can increase the risk of weight-related health problems.   · Talk with your quit.  · Limit how much alcohol you drink. Moderate amounts of alcohol (up to 2 drinks a day for men, 1 drink a day for women) are okay. But drinking too much can lead to liver problems, high blood pressure, and other health problems. Family health  If you have a family, there are many things you can do together to improve your health. · Eat meals together as a family as often as possible. · Eat healthy foods. This includes fruits, vegetables, lean meats and dairy, and whole grains. · Include your family in your fitness plan. Most people think of activities such as jogging or tennis as the way to fitness, but there are many ways you and your family can be more active. Anything that makes you breathe hard and gets your heart pumping is exercise. Here are some tips:  ? Walk to do errands or to take your child to school or the bus.  ? Go for a family bike ride after dinner instead of watching TV. Where can you learn more? Go to https://Jobinasecondmelissa.PowerPot. org and sign in to your LiveHotSpot account. Enter G761 in the CollegeWikis box to learn more about \"A Healthy Lifestyle: Care Instructions. \"     If you do not have an account, please click on the \"Sign Up Now\" link. Current as of: September 23, 2020               Content Version: 12.9  © 2006-2021 Healthwise, Incorporated. Care instructions adapted under license by [facility] ([facility]). If you have questions about a medical condition or this instruction, always ask your healthcare professional. [place] [address] any warranty or liability for your use of this information. Patient Education        Well Visit, Ages 25 to 48: Care Instructions  Overview     Well visits can help you stay healthy. Your doctor has checked your overall health and may have suggested ways to take good care of yourself. Your doctor also may have recommended tests.  At home, you can help prevent illness with healthy eating, regular exercise, and other steps.  Follow-up care is a key part of your treatment and safety. Be sure to make and go to all appointments, and call your doctor if you are having problems. It's also a good idea to know your test results and keep a list of the medicines you take. How can you care for yourself at home? · Get screening tests that you and your doctor decide on. Screening helps find diseases before any symptoms appear. · Eat healthy foods. Choose fruits, vegetables, whole grains, protein, and low-fat dairy foods. Limit fat, especially saturated fat. Reduce salt in your diet. · Limit alcohol. If you are a man, have no more than 2 drinks a day or 14 drinks a week. If you are a woman, have no more than 1 drink a day or 7 drinks a week. · Get at least 30 minutes of physical activity on most days of the week. Walking is a good choice. You also may want to do other activities, such as running, swimming, cycling, or playing tennis or team sports. Discuss any changes in your exercise program with your doctor. · Reach and stay at a healthy weight. This will lower your risk for many problems, such as obesity, diabetes, heart disease, and high blood pressure. · Do not smoke or allow others to smoke around you. If you need help quitting, talk to your doctor about stop-smoking programs and medicines. These can increase your chances of quitting for good. · Care for your mental health. It is easy to get weighed down by worry and stress. Learn strategies to manage stress, like deep breathing and mindfulness, and stay connected with your family and community. If you find you often feel sad or hopeless, talk with your doctor. Treatment can help. · Talk to your doctor about whether you have any risk factors for sexually transmitted infections (STIs). You can help prevent STIs if you wait to have sex with a new partner (or partners) until you've each been tested for STIs.  It also helps if you use condoms (male or female condoms) and if you limit your sex partners to one person who only has sex with you. Vaccines are available for some STIs, such as HPV. · Use birth control if it's important to you to prevent pregnancy. Talk with your doctor about the choices available and what might be best for you. · If you think you may have a problem with alcohol or drug use, talk to your doctor. This includes prescription medicines (such as amphetamines and opioids) and illegal drugs (such as cocaine and methamphetamine). Your doctor can help you figure out what type of treatment is best for you. · Protect your skin from too much sun. When you're outdoors from 10 a.m. to 4 p.m., stay in the shade or cover up with clothing and a hat with a wide brim. Wear sunglasses that block UV rays. Even when it's cloudy, put broad-spectrum sunscreen (SPF 30 or higher) on any exposed skin. · See a dentist one or two times a year for checkups and to have your teeth cleaned. · Wear a seat belt in the car. When should you call for help? Watch closely for changes in your health, and be sure to contact your doctor if you have any problems or symptoms that concern you. Where can you learn more? Go to https://BlueKitepeMD Synergy Solutionseb.healthBrandmail Solutionspartners. org and sign in to your Swarm account. Enter P072 in the [facility] box to learn more about \"Well Visit, Ages 25 to 48: Care Instructions. \"     If you do not have an account, please click on the \"Sign Up Now\" link. Current as of: May 27, 2020               Content Version: 12.9  © 6921-5614 Healthwise, Incorporated. Care instructions adapted under license by [facility] ([facility]). If you have questions about a medical condition or this instruction, always ask your healthcare professional. [place] [address] any warranty or liability for your use of this information.

## 2021-08-30 ENCOUNTER — OFFICE VISIT (OUTPATIENT)
Dept: OBGYN CLINIC | Age: 36
End: 2021-08-30
Payer: COMMERCIAL

## 2021-08-30 VITALS
BODY MASS INDEX: 22.02 KG/M2 | SYSTOLIC BLOOD PRESSURE: 127 MMHG | DIASTOLIC BLOOD PRESSURE: 82 MMHG | HEART RATE: 66 BPM | WEIGHT: 129 LBS | HEIGHT: 64 IN

## 2021-08-30 DIAGNOSIS — Z12.4 SCREENING FOR CERVICAL CANCER: ICD-10-CM

## 2021-08-30 DIAGNOSIS — Z01.419 ENCOUNTER FOR WELL WOMAN EXAM WITH ROUTINE GYNECOLOGICAL EXAM: Primary | ICD-10-CM

## 2021-08-30 PROCEDURE — 99395 PREV VISIT EST AGE 18-39: CPT | Performed by: ADVANCED PRACTICE MIDWIFE

## 2021-08-30 ASSESSMENT — ENCOUNTER SYMPTOMS
RESPIRATORY NEGATIVE: 1
GASTROINTESTINAL NEGATIVE: 1
ALLERGIC/IMMUNOLOGIC NEGATIVE: 1
EYES NEGATIVE: 1

## 2021-08-30 NOTE — PROGRESS NOTES
Pt presents today for pap smear and breast exam.    Last mammogram: never  Last pap smear: 2019, negative   Contraception: nothing   : 2   Para: 2  AB: 0  Last bone density: never    Last colonoscopy: never  Menarche: 8years old  LMP: 7/15/2020, she is still breastfeeding  Menses: typically irregular

## 2021-08-30 NOTE — PROGRESS NOTES
[facility]  CNM Office Note    Doe Martínez is a 37 y.o. female who presents today for her medical conditions/ complaints as noted below. Chief Complaint   Patient presents with    Gynecologic Exam     pap smear and breast exam.         DILAN Piña presents for annual gyn exam. She has no complaints. She declines contraception. She is breastfeeding. No menses at this time. No sexual concerns. Last mammogram: never  Last pap smear: 2019, negative   Contraception: nothing   : 2   Para: 2  AB: 0  Last bone density: never    Last colonoscopy: never  Menarche: 8years old  LMP: 7/15/2020, she is still breastfeeding  Menses: typically irregular  Patient Active Problem List   Diagnosis    Biliary dyskinesia    Post term pregnancy at 39 weeks gestation    Prolapse of cord complicating labor and delivery       Patient's last menstrual period was 07/15/2020 (exact date).   [id number]    Past Medical History:   Diagnosis Date    Esophageal reflux 10/10/2017    Gallbladder disease     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     Patient tested positive about two weeks ago from today, 16    Postpartum depression     undiagnosed    Prolactin deficiency [facility])      Past Surgical History:   Procedure Laterality Date     SECTION N/A 2021     SECTION performed by Faustina Goodson MD at [facility] OR    CHOLECYSTECTOMY, LAPAROSCOPIC N/A 10/13/2016    CHOLECYSTECTOMY LAPAROSCOPIC performed by Eddie Serrano MD at [address] [place] [address] TYMPANOSTOMY TUBE PLACEMENT       Family History   Problem Relation Age of Onset    Hypertension Mother     Diabetes Father     Hypertension Father     Alzheimer's Disease Maternal Grandmother     Diabetes Maternal Grandmother     Heart Disease Maternal Grandfather      Social History     Tobacco Use    Smoking status: Never Smoker    Smokeless tobacco: Never Used   Substance Use Topics    Alcohol use: No       Current Outpatient Medications Medication Sig Dispense Refill    loratadine (CLARITIN) 10 MG capsule Take 10 mg by mouth daily      CALCIUM PO Take by mouth      Prenatal MV-Min-Fe Fum-FA-DHA (PRENATAL 1 PO) Take by mouth       No current facility-administered medications for this visit. No Known Allergies  Vitals:    08/30/21 1359   BP: 127/82   Pulse: 66     Body mass index is 22.14 kg/m². Review of Systems   Constitutional: Negative. HENT: Negative. Eyes: Negative. Respiratory: Negative. Cardiovascular: Negative. Gastrointestinal: Negative. Endocrine: Negative. Genitourinary: Negative. Musculoskeletal: Negative. Skin: Negative. Allergic/Immunologic: Negative. Neurological: Negative. Hematological: Negative. Psychiatric/Behavioral: Negative. Physical Exam  Constitutional:       Appearance: She is well-developed. HENT:      Head: Normocephalic and atraumatic. Eyes:      Conjunctiva/sclera: Conjunctivae normal.      Pupils: Pupils are equal, round, and reactive to light. Neck:      Thyroid: No thyromegaly. Trachea: No tracheal deviation. Cardiovascular:      Rate and Rhythm: Normal rate and regular rhythm. Heart sounds: Normal heart sounds. No murmur heard. Pulmonary:      Effort: Pulmonary effort is normal. No respiratory distress. Breath sounds: Normal breath sounds. Chest:      Comments: Breasts symmetrical without tenderness, masses, or nipple discharge. Nipples everted bilaterally. Abdominal:      General: There is no distension. Palpations: Abdomen is soft. Tenderness: There is no abdominal tenderness. There is no guarding. Genitourinary:     General: Normal vulva. Exam position: Lithotomy position. Labia:         Right: No rash or lesion. Left: No rash or lesion. Vagina: Normal. No erythema or lesions. Cervix: Normal.      Uterus: Normal. Not tender.        Adnexa: Right adnexa normal and left adnexa normal. Right: No mass, tenderness or fullness. Left: No mass, tenderness or fullness. Musculoskeletal:         General: Normal range of motion. Cervical back: Normal range of motion and neck supple. Skin:     General: Skin is warm and dry. Capillary Refill: Capillary refill takes less than 2 seconds. Neurological:      Mental Status: She is alert and oriented to person, place, and time. Psychiatric:         Behavior: Behavior normal.         Thought Content: Thought content normal.         Judgment: Judgment normal.          Diagnosis Orders   1. Encounter for well woman exam with routine gynecological exam  PAP SMEAR   2. Screening for cervical cancer  PAP SMEAR       MEDICATIONS:  No orders of the defined types were placed in this encounter. ORDERS:  Orders Placed This Encounter   Procedures    PAP SMEAR       PLAN:  1. WWE - PAP collected. SBE reviewed and CBE performed.

## 2021-10-27 ENCOUNTER — PATIENT MESSAGE (OUTPATIENT)
Dept: INTERNAL MEDICINE | Age: 36
End: 2021-10-27

## 2021-10-27 RX ORDER — AMOXICILLIN 500 MG/1
500 CAPSULE ORAL 3 TIMES DAILY
Qty: 21 CAPSULE | Refills: 0 | Status: SHIPPED | OUTPATIENT
Start: 2021-10-27 | End: 2021-11-03

## 2021-10-27 NOTE — TELEPHONE ENCOUNTER
I do not think she has any follow-up scheduled with us at all. However, we can call her in some amoxicillin, which is safe for breast-feeding. She can take 500 mg p.o. 3 times daily for the next 7 days. If her symptoms gets worse, she needs to call and let us know. We can schedule her for a physical at some point in the future.

## 2022-02-04 ENCOUNTER — VIRTUAL VISIT (OUTPATIENT)
Dept: INTERNAL MEDICINE | Age: 37
End: 2022-02-04
Payer: COMMERCIAL

## 2022-02-04 ENCOUNTER — TELEPHONE (OUTPATIENT)
Dept: INTERNAL MEDICINE | Age: 37
End: 2022-02-04

## 2022-02-04 DIAGNOSIS — Z20.822 EXPOSURE TO CONFIRMED CASE OF COVID-19: Primary | ICD-10-CM

## 2022-02-04 DIAGNOSIS — Z20.822 EXPOSURE TO CONFIRMED CASE OF COVID-19: ICD-10-CM

## 2022-02-04 PROCEDURE — G8428 CUR MEDS NOT DOCUMENT: HCPCS | Performed by: INTERNAL MEDICINE

## 2022-02-04 PROCEDURE — G8420 CALC BMI NORM PARAMETERS: HCPCS | Performed by: INTERNAL MEDICINE

## 2022-02-04 PROCEDURE — G8484 FLU IMMUNIZE NO ADMIN: HCPCS | Performed by: INTERNAL MEDICINE

## 2022-02-04 PROCEDURE — 1036F TOBACCO NON-USER: CPT | Performed by: INTERNAL MEDICINE

## 2022-02-04 PROCEDURE — 99213 OFFICE O/P EST LOW 20 MIN: CPT | Performed by: INTERNAL MEDICINE

## 2022-02-04 ASSESSMENT — ENCOUNTER SYMPTOMS
EYE ITCHING: 0
SINUS PAIN: 0
BACK PAIN: 0
NAUSEA: 0
PHOTOPHOBIA: 0
VOMITING: 0
DIARRHEA: 0
SORE THROAT: 1
EYE DISCHARGE: 0
CONSTIPATION: 0
EYE PAIN: 0
SHORTNESS OF BREATH: 0
COLOR CHANGE: 0
EYE REDNESS: 0
FACIAL SWELLING: 0
ABDOMINAL DISTENTION: 0
SINUS PRESSURE: 0
RHINORRHEA: 0
WHEEZING: 0
COUGH: 0
ABDOMINAL PAIN: 0
BLOOD IN STOOL: 0
CHEST TIGHTNESS: 0
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
## 2022-02-04 NOTE — PROGRESS NOTES
contacted  to see if they could do a covid test. Per the  of , they would if the order was in and clinic collect. THis NCMA put the new order in and let the pt know.

## 2022-02-04 NOTE — TELEPHONE ENCOUNTER
I reached out to the patient informed her of who I was and that I was notified that she may not have been happy with her recent visit and mentioned that she was going to change PCP's. I asked her if she would be willing to share if something particular happened to make sure feel this way and her response was that she just did not care for Dr. Bradley Melendez' bedside manner and that they had a personality conflict. She went on to say the reason she has not been seen in the last year or so was because she dreads coming to the office. I asked her if she could share with me anything specific that she dreads and she said it was nothing we could fix because it was Dr. Bradley Melendez' personality. She states she is going to change PCP's and has it on her calendar to address this week as all places are currently closed at this time. She reports that she would like me to cancel her appt that is scheduled for 6/6/2022 and she does not want Dr. Bradley Melendez to be removed as her PCP until she has secured an appointment with another office. I requested the patient to call our office once she has secured an appointment so we can removed Dr. Bradley Melendez as her PCP and instructed her to sign a MARSHA in order to have her records sent to the new office and pt CJ.

## 2022-02-05 LAB — SARS-COV-2, PCR: DETECTED

## 2022-02-22 ENCOUNTER — HOSPITAL ENCOUNTER (OUTPATIENT)
Dept: ULTRASOUND IMAGING | Age: 37
Discharge: HOME | End: 2022-02-22
Payer: COMMERCIAL

## 2022-02-22 ENCOUNTER — OFFICE VISIT (OUTPATIENT)
Dept: OBGYN CLINIC | Age: 37
End: 2022-02-22
Payer: COMMERCIAL

## 2022-02-22 VITALS
DIASTOLIC BLOOD PRESSURE: 85 MMHG | SYSTOLIC BLOOD PRESSURE: 126 MMHG | BODY MASS INDEX: 23.86 KG/M2 | HEART RATE: 97 BPM | WEIGHT: 139 LBS

## 2022-02-22 DIAGNOSIS — Z36.89 CONFIRM FETAL CARDIAC ACTIVITY USING ULTRASOUND: ICD-10-CM

## 2022-02-22 DIAGNOSIS — Z34.90 PREGNANCY WITH UNCERTAIN DATES, ANTEPARTUM: ICD-10-CM

## 2022-02-22 DIAGNOSIS — O20.9 BLEEDING IN EARLY PREGNANCY: ICD-10-CM

## 2022-02-22 DIAGNOSIS — Z36.9 ANTENATAL SCREENING ENCOUNTER: ICD-10-CM

## 2022-02-22 DIAGNOSIS — O34.219 PREVIOUS CESAREAN DELIVERY AFFECTING PREGNANCY: ICD-10-CM

## 2022-02-22 DIAGNOSIS — O09.891 SHORT INTERVAL BETWEEN PREGNANCIES AFFECTING PREGNANCY IN FIRST TRIMESTER, ANTEPARTUM: ICD-10-CM

## 2022-02-22 DIAGNOSIS — N91.2 AMENORRHEA: Primary | ICD-10-CM

## 2022-02-22 LAB
ABO/RH: NORMAL
ANTIBODY SCREEN: NORMAL
CONTROL: ABNORMAL
GONADOTROPIN, CHORIONIC (HCG) QUANT: 2176 MIU/ML (ref 0–5.3)
PREGNANCY TEST URINE, POC: POSITIVE
PROGESTERONE LEVEL: 4.82 NG/ML

## 2022-02-22 PROCEDURE — G8420 CALC BMI NORM PARAMETERS: HCPCS | Performed by: NURSE PRACTITIONER

## 2022-02-22 PROCEDURE — 81025 URINE PREGNANCY TEST: CPT | Performed by: NURSE PRACTITIONER

## 2022-02-22 PROCEDURE — 0502F SUBSEQUENT PRENATAL CARE: CPT | Performed by: NURSE PRACTITIONER

## 2022-02-22 PROCEDURE — G8484 FLU IMMUNIZE NO ADMIN: HCPCS | Performed by: NURSE PRACTITIONER

## 2022-02-22 PROCEDURE — 1036F TOBACCO NON-USER: CPT | Performed by: NURSE PRACTITIONER

## 2022-02-22 PROCEDURE — 76817 TRANSVAGINAL US OBSTETRIC: CPT

## 2022-02-22 PROCEDURE — G8427 DOCREV CUR MEDS BY ELIG CLIN: HCPCS | Performed by: NURSE PRACTITIONER

## 2022-02-22 RX ORDER — PROGESTERONE 200 MG/1
200 CAPSULE ORAL NIGHTLY
Qty: 30 CAPSULE | Refills: 3 | Status: SHIPPED | OUTPATIENT
Start: 2022-02-22 | End: 2022-05-17 | Stop reason: ALTCHOICE

## 2022-02-22 ASSESSMENT — ENCOUNTER SYMPTOMS
GASTROINTESTINAL NEGATIVE: 1
RESPIRATORY NEGATIVE: 1
EYES NEGATIVE: 1

## 2022-02-22 NOTE — PROGRESS NOTES
Misty Kelly is a 31 y.o. female who presents today for her medical conditions/ complaints as noted below. Misty Kelly is c/o of Confirmation        HPI  Patient presents today for pregnancy confirmation. Patient states she had bleeding a week ago on Saturday, she is asking for ultrasound today. LMP 22  Third pregnancy   Desires Lorrie ( wants  and no epidural ) if she needs another section, would like Mod[facility] Barnes  Her baby is 6 months old, s/p csection  Patient's last menstrual period was 2022. [id number]    Past Medical History:   Diagnosis Date    Esophageal reflux 10/10/2017    Gallbladder disease     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     Patient tested positive about two weeks ago from today, 16    Postpartum depression     undiagnosed    Prolactin deficiency [facility])      Past Surgical History:   Procedure Laterality Date     SECTION N/A 2021     SECTION performed by Soha Cancino MD at [facility] L[facility] OR    CHOLECYSTECTOMY, LAPAROSCOPIC N/A 10/13/2016    CHOLECYSTECTOMY LAPAROSCOPIC performed [facility] at [facility]      TYMPANOSTOMY TUBE PLACEMENT       Family History   Problem Relation Age of Onset    Hypertension Mother     Diabetes Father     Hypertension Father     Alzheimer's Disease Maternal Grandmother     Diabetes Maternal Grandmother     Heart Disease Maternal Grandfather      Social History     Tobacco Use    Smoking status: Never Smoker    Smokeless tobacco: Never Used   Substance Use Topics    Alcohol use: No       Current Outpatient Medications   Medication Sig Dispense Refill    loratadine (CLARITIN) 10 MG capsule Take 10 mg by mouth daily      CALCIUM PO Take by mouth      Prenatal MV-Min-Fe Fum-FA-DHA (PRENATAL 1 PO) Take by mouth       No current facility-administered medications for this visit. No Known Allergies  Vitals:    22 0837   BP: 126/85   Pulse: 97     Body mass index is 23.86 kg/m².     Review of Systems Constitutional: Negative. HENT: Negative. Eyes: Negative. Respiratory: Negative. Cardiovascular: Negative. Gastrointestinal: Negative. Genitourinary: Positive for menstrual problem. Negative for difficulty urinating, dyspareunia, dysuria, enuresis, frequency, hematuria, pelvic pain, urgency and vaginal discharge. Musculoskeletal: Negative. Skin: Negative. Neurological: Negative. Psychiatric/Behavioral: Negative. Physical Exam  Vitals and nursing note reviewed. Constitutional:       General: She is not in acute distress. Appearance: She is well-developed. She is not diaphoretic. HENT:      Head: Normocephalic and atraumatic. Eyes:      Conjunctiva/sclera: Conjunctivae normal.      Pupils: Pupils are equal, round, and reactive to light. Pulmonary:      Effort: Pulmonary effort is normal.   Abdominal:      Tenderness: There is no guarding. Musculoskeletal:         General: Normal range of motion. Cervical back: Normal range of motion. Comments: Normal ROM in all 4 extremities; normal gait   Skin:     General: Skin is warm and dry. Neurological:      Mental Status: She is alert and oriented to person, place, and time. Motor: No abnormal muscle tone. Coordination: Coordination normal.   Psychiatric:         Behavior: Behavior normal.          Diagnosis Orders   1. Amenorrhea  POCT urine pregnancy   2. Confirm fetal cardiac activity using ultrasound  US OB TRANSVAGINAL   3. Bleeding in early pregnancy  US OB TRANSVAGINAL    HCG, Quantitative, Pregnancy    Progesterone   4. Short interval between pregnancies affecting pregnancy in first trimester, antepartum     5. Previous  delivery affecting pregnancy     6. Pregnancy with uncertain dates, antepartum  HCG, Quantitative, Pregnancy    Progesterone   7.  screening encounter  Chlamydia/N. Gonorrhoeae/T. Vaginalis    Varicella Zoster Antibody, IgG    HIV Obstetric Panel    Culture, Urine    HCG, Quantitative, Pregnancy    Progesterone       MEDICATIONS:  No orders of the defined types were placed in this encounter. ORDERS:  Orders Placed This Encounter   Procedures    Chlamydia/N. Gonorrhoeae/T. Vaginalis    Culture, Urine    US OB TRANSVAGINAL    Varicella Zoster Antibody, IgG    HIV Obstetric Panel    HCG, Quantitative, Pregnancy    Progesterone    POCT urine pregnancy       PLAN:  Checking labs and [address] today  Pregnancy recommendations discussed  Plan of care was discussed with patient. Patient was encouraged to adhere to a well-balanced diet, including increasing water intake and limiting excessive caffeine and salt. The benefits of exercise were discussed; however she was advised against heavy lifting, sit-ups and abdominal crunches. A list of safe OTC medications was provided and discussed. The patient was cautioned against the use of tanning beds, hot tubs, saunas, and x-rays. Avoidance of tobacco, alcohol and illicit drugs was also discussed due to harmful effects on the fetus and increased risks associated with pregnancy. Certain labs and ultrasounds are required at certain times during pregnancy but others are optional, including the serum integrated screen/[place]/AFP/ Panorama, and other genetic testing. The patient was encouraged to attend childbirth classes and general hospital information was provided based on patients hospital of choice. Follow up in 4 weeks. Patient Instructions   Patient Education        Nutrition During Pregnancy: Care Instructions  Overview     Healthy eating when you are pregnant is important for you and your baby. It can help you feel well and have a successful pregnancy and delivery. During pregnancy your nutrition needs increase. Even if you have excellent eating habits, your doctor may recommend a multivitamin to make sure you get enough iron and folic acid. You may wonder how much weight you should gain.  In general, if you were at a healthy weight before you became pregnant, then you should gain between 25 and 35 pounds. If you were overweight before pregnancy, then you'll likely be advised to gain 15 to 25 pounds. If you were underweight before pregnancy, then you'll probably be advised to gain 28 to 40 pounds. Your doctor will work with you to set a weight goal that is right for you. Gaining a healthy amount of weight helps you have a healthy baby. Follow-up care is a key part of your treatment and safety. Be sure to make and go to all appointments, and call your doctor if you are having problems. It's also a good idea to know your test results and keep a list of the medicines you take. How can you care for yourself at home? · Eat plenty of fruits and vegetables. Include a variety of orange, yellow, and leafy dark-green vegetables every day. · Choose whole-grain bread, cereal, and pasta. Good choices include whole wheat bread, whole wheat pasta, brown rice, and oatmeal.  · Get 4 or more servings of milk and milk products each day. Good choices include nonfat or low-fat milk, yogurt, and cheese. If you cannot eat milk products, you can get calcium from calcium-fortified products such as orange juice, soy milk, and tofu. Other non-milk sources of calcium include leafy green vegetables, such as broccoli, kale, mustard greens, turnip greens, bok osman, and brussels sprouts. · If you eat meat, pick lower-fat types. Good choices include lean cuts of meat and chicken or turkey without the skin. · Do not eat shark, swordfish, shabana mackerel, or tilefish. They have high levels of mercury, which is dangerous to your baby. You can eat up to 12 ounces a week of fish or shellfish that have low mercury levels. Good choices include shrimp, wild salmon, pollock, and catfish. Limit some other types of fish, such as white (albacore) tuna, to 4 oz (0.1 kg) a week. · Heat lunch meats (such as turkey, ham, or bologna) to 165°F before you eat them.  This reduces your risk of getting sick from a kind of bacteria that can be found in lunch meats. · Do not eat unpasteurized soft cheeses, such as brie, feta, fresh mozzarella, and blue cheese. They have a bacteria that could harm your baby. · Limit caffeine. If you drink coffee or tea, have no more than 1 cup a day. Caffeine is also found in stacy. · Do not drink any alcohol. No amount of alcohol has been found to be safe during pregnancy. · Do not diet or try to lose weight. For example, do not follow a low-carbohydrate diet. If you are overweight at the start of your pregnancy, your doctor will work with you to manage your weight gain. · Tell your doctor about all vitamins and supplements you take. When should you call for help? Watch closely for changes in your health, and be sure to contact your doctor if you have any problems. Where can you learn more? Go to https://AccessPay.[employer]. org and sign in to your [employer] account. Enter Y785 in the Ammado box to learn more about \"Nutrition During Pregnancy: Care Instructions. \"     If you do not have an account, please click on the \"Sign Up Now\" link. Current as of: September 8, 2021               Content Version: 13.1  © 6180-1614 [employer]. Care instructions adapted under license by [facility] ([facility]). If you have questions about a medical condition or this instruction, always ask your healthcare professional. [place] [address] any warranty or liability for your use of this information. Patient Education        Weeks 6 to 10 of Your Pregnancy: Care Instructions  Overview     During the first 6 to 10 weeks of your pregnancy, your body goes through many changes. Your baby grows very quickly, even though you can't feel it yet. You may start to feel different, both in your body and your emotions. Because each pregnancy is unique, there's no right way to feel.  You may feel the healthiest you've ever been, or you might feel tired or sick to your stomach (\"morning sickness\"). These early weeks are a time to make healthy choices and to eat the best foods for you and your baby. This is also a good time to think about birth defects testing. These are tests done during pregnancy to look for possible problems with the baby. First-trimester tests for birth defects can be done between 8 and 13 weeks of pregnancy, depending on the test. Talk with your doctor about what kinds of tests are available. Follow-up care is a key part of your treatment and safety. Be sure to make and go to all appointments, and call your doctor if you are having problems. It's also a good idea to know your test results and keep a list of the medicines you take. How can you care for yourself at home? Eat well  · Eat at least 3 meals and 2 healthy snacks every day. Eat fresh, whole foods, including:  ? 7 or more servings of bread, tortillas, cereal, rice, pasta, or oatmeal.  ? 3 or more servings of vegetables, especially leafy green vegetables. ? 2 or more servings of fruits. ? 3 or more servings of milk, yogurt, or cheese. ? 2 or more servings of meat, turkey, chicken, fish, eggs, or dried beans. · Drink plenty of fluids, especially water. Avoid sodas and other sweetened drinks. · Choose foods that have important vitamins for your baby, such as calcium, iron, and folate. ? Dairy products, tofu, canned fish with bones, almonds, broccoli, dark leafy greens, corn tortillas, and fortified orange juice are good sources of calcium. ? Beef, poultry, liver, spinach, lentils, dried beans, fortified cereals, and dried fruits are rich in iron. ? Dark leafy greens, broccoli, asparagus, liver, fortified cereals, orange juice, peanuts, and almonds are good sources of folate. · Avoid foods that could harm your baby. ? Do not eat raw or undercooked meat, chicken, or fish (such as sushi or raw oysters).   ? Do not eat raw eggs or foods that contain raw eggs, such as Caesar dressing. ? Do not eat soft cheeses and unpasteurized dairy foods, such as Brie, feta, or blue cheese. ? Do not eat fish that contains a lot of mercury, such as shark, swordfish, tilefish, or shabana mackerel. Limit some other types of fish, such as white (albacore) tuna, to 4 oz (0.1 kg) a week. ? Do not eat raw sprouts, especially alfalfa sprouts. ? Cut down on caffeine, such as coffee, tea, and cola. Protect yourself and your baby  · Do not touch roger litter or cat feces. They can cause an infection that could harm your baby. · High body temperature can be harmful to your baby. So if you want to use a sauna or hot tub, be sure to talk to your doctor about how to use it safely. [place] with morning sickness  · Sip small amounts of water, juices, or shakes. Try drinking between meals, not with meals. · Eat 5 or 6 small meals a day. Try dry toast or crackers when you first get up, and eat breakfast a little later. · Avoid spicy, greasy, and fatty foods. · When you feel sick, open your windows or go for a short walk to get fresh air. · Try nausea wristbands. These help some people. · Tell your doctor if you think your prenatal vitamins make you sick. Where can you learn more? Go to https://Farseerpejose de jesusCoin.ioGenetics. org and sign in to your [employer] account. Enter G112 in the [facility] box to learn more about \"Weeks 6 to 10 of Your Pregnancy: Care Instructions. \"     If you do not have an account, please click on the \"Sign Up Now\" link. Current as of: June 16, 2021               Content Version: 13.1  © 5023-3441 Healthwise, Incorporated. Care instructions adapted under license by [facility] ([facility]). If you have questions about a medical condition or this instruction, always ask your healthcare professional. [place] [address] any warranty or liability for your use of this information.

## 2022-02-22 NOTE — PATIENT INSTRUCTIONS
Patient Education        Nutrition During Pregnancy: Care Instructions  Overview     Healthy eating when you are pregnant is important for you and your baby. It can help you feel well and have a successful pregnancy and delivery. During pregnancy your nutrition needs increase. Even if you have excellent eating habits, your doctor may recommend a multivitamin to make sure you get enough iron and folic acid. You may wonder how much weight you should gain. In general, if you were at a healthy weight before you became pregnant, then you should gain between 25 and 35 pounds. If you were overweight before pregnancy, then you'll likely be advised to gain 15 to 25 pounds. If you were underweight before pregnancy, then you'll probably be advised to gain 28 to 40 pounds. Your doctor will work with you to set a weight goal that is right for you. Gaining a healthy amount of weight helps you have a healthy baby. Follow-up care is a key part of your treatment and safety. Be sure to make and go to all appointments, and call your doctor if you are having problems. It's also a good idea to know your test results and keep a list of the medicines you take. How can you care for yourself at home? · Eat plenty of fruits and vegetables. Include a variety of orange, yellow, and leafy dark-green vegetables every day. · Choose whole-grain bread, cereal, and pasta. Good choices include whole wheat bread, whole wheat pasta, brown rice, and oatmeal.  · Get 4 or more servings of milk and milk products each day. Good choices include nonfat or low-fat milk, yogurt, and cheese. If you cannot eat milk products, you can get calcium from calcium-fortified products such as orange juice, soy milk, and tofu. Other non-milk sources of calcium include leafy green vegetables, such as broccoli, kale, mustard greens, turnip greens, bok osman, and brussels sprouts. · If you eat meat, pick lower-fat types.  Good choices include lean cuts of meat and chicken or turkey without the skin. · Do not eat shark, swordfish, shabana mackerel, or tilefish. They have high levels of mercury, which is dangerous to your baby. You can eat up to 12 ounces a week of fish or shellfish that have low mercury levels. Good choices include shrimp, wild salmon, pollock, and catfish. Limit some other types of fish, such as white (albacore) tuna, to 4 oz (0.1 kg) a week. · Heat lunch meats (such as turkey, ham, or bologna) to 165°F before you eat them. This reduces your risk of getting sick from a kind of bacteria that can be found in lunch meats. · Do not eat unpasteurized soft cheeses, such as brie, feta, fresh mozzarella, and blue cheese. They have a bacteria that could harm your baby. · Limit caffeine. If you drink coffee or tea, have no more than 1 cup a day. Caffeine is also found in stacy. · Do not drink any alcohol. No amount of alcohol has been found to be safe during pregnancy. · Do not diet or try to lose weight. For example, do not follow a low-carbohydrate diet. If you are overweight at the start of your pregnancy, your doctor will work with you to manage your weight gain. · Tell your doctor about all vitamins and supplements you take. When should you call for help? Watch closely for changes in your health, and be sure to contact your doctor if you have any problems. Where can you learn more? Go to https://Wipstermelissa.healthLight[employer]. org and sign in to your EasySize account. Enter Y785 in the WalkHub box to learn more about \"Nutrition During Pregnancy: Care Instructions. \"     If you do not have an account, please click on the \"Sign Up Now\" link. Current as of: September 8, 2021               Content Version: 13.1  © 3544-5570 Healthwise, Incorporated. Care instructions adapted under license by [facility] ([facility]).  If you have questions about a medical condition or this instruction, always ask your healthcare professional. [address] any warranty or liability for your use of this information. Patient Education        Weeks 6 to 10 of Your Pregnancy: Care Instructions  Overview     During the first 6 to 10 weeks of your pregnancy, your body goes through many changes. Your baby grows very quickly, even though you can't feel it yet. You may start to feel different, both in your body and your emotions. Because each pregnancy is unique, there's no right way to feel. You may feel the healthiest you've ever been, or you might feel tired or sick to your stomach (\"morning sickness\"). These early weeks are a time to make healthy choices and to eat the best foods for you and your baby. This is also a good time to think about birth defects testing. These are tests done during pregnancy to look for possible problems with the baby. First-trimester tests for birth defects can be done between 8 and 13 weeks of pregnancy, depending on the test. Talk with your doctor about what kinds of tests are available. Follow-up care is a key part of your treatment and safety. Be sure to make and go to all appointments, and call your doctor if you are having problems. It's also a good idea to know your test results and keep a list of the medicines you take. How can you care for yourself at home? Eat well  · Eat at least 3 meals and 2 healthy snacks every day. Eat fresh, whole foods, including:  ? 7 or more servings of bread, tortillas, cereal, rice, pasta, or oatmeal.  ? 3 or more servings of vegetables, especially leafy green vegetables. ? 2 or more servings of fruits. ? 3 or more servings of milk, yogurt, or cheese. ? 2 or more servings of meat, turkey, chicken, fish, eggs, or dried beans. · Drink plenty of fluids, especially water. Avoid sodas and other sweetened drinks. · Choose foods that have important vitamins for your baby, such as calcium, iron, and folate.   ? Dairy products, tofu, canned fish with bones, almonds, broccoli, dark leafy greens, corn account, please click on the \"Sign Up Now\" link. Current as of: June 16, 2021               Content Version: 13.1  © 2006-2021 Healthwise, Incorporated. Care instructions adapted under license by [facility] ([facility]). If you have questions about a medical condition or this instruction, always ask your healthcare professional. [address] any warranty or liability for your use of this information.

## 2022-02-23 LAB
C. TRACHOMATIS DNA ,URINE: NEGATIVE
N. GONORRHOEAE DNA, URINE: NEGATIVE
TRICHOMONAS VAGINALIS DNA, URINE: NEGATIVE

## 2022-02-24 LAB
BASOPHILS ABSOLUTE: 0.1 K/UL (ref 0–0.2)
BASOPHILS RELATIVE PERCENT: 0.8 % (ref 0–1)
EOSINOPHILS ABSOLUTE: 0.2 K/UL (ref 0–0.6)
EOSINOPHILS RELATIVE PERCENT: 2 % (ref 0–5)
GONADOTROPIN, CHORIONIC (HCG) QUANT: 3553 MIU/ML (ref 0–5.3)
HCT VFR BLD CALC: 47 % (ref 37–47)
HEMOGLOBIN: 15.2 G/DL (ref 12–16)
HEPATITIS B SURFACE ANTIGEN INTERPRETATION: ABNORMAL
HIV-1 P24 AG: ABNORMAL
IMMATURE GRANULOCYTES #: 0 K/UL
LYMPHOCYTES ABSOLUTE: 2.2 K/UL (ref 1.1–4.5)
LYMPHOCYTES RELATIVE PERCENT: 27.4 % (ref 20–40)
MCH RBC QN AUTO: 28 PG (ref 27–31)
MCHC RBC AUTO-ENTMCNC: 32.3 G/DL (ref 33–37)
MCV RBC AUTO: 86.6 FL (ref 81–99)
MONOCYTES ABSOLUTE: 0.5 K/UL (ref 0–0.9)
MONOCYTES RELATIVE PERCENT: 6.2 % (ref 0–10)
NEUTROPHILS ABSOLUTE: 5.1 K/UL (ref 1.5–7.5)
NEUTROPHILS RELATIVE PERCENT: 63.3 % (ref 50–65)
PDW BLD-RTO: 12.2 % (ref 11.5–14.5)
PLATELET # BLD: 265 K/UL (ref 130–400)
PMV BLD AUTO: 8.8 FL (ref 9.4–12.3)
PROGESTERONE LEVEL: 8.62 NG/ML
RAPID HIV 1&2: ABNORMAL
RBC # BLD: 5.43 M/UL (ref 4.2–5.4)
RPR: ABNORMAL
RUBELLA ANTIBODY IGG: REACTIVE
URINE CULTURE, ROUTINE: NORMAL
WBC # BLD: 8 K/UL (ref 4.8–10.8)

## 2022-02-25 DIAGNOSIS — R79.89 LOW SERUM PROGESTERONE: Primary | ICD-10-CM

## 2022-02-25 DIAGNOSIS — E34.9 ELEVATED SERUM HCG: ICD-10-CM

## 2022-02-28 DIAGNOSIS — R79.89 LOW SERUM PROGESTERONE: ICD-10-CM

## 2022-02-28 DIAGNOSIS — E34.9 ELEVATED SERUM HCG: ICD-10-CM

## 2022-02-28 LAB
GONADOTROPIN, CHORIONIC (HCG) QUANT: 8889 MIU/ML (ref 0–5.3)
PROGESTERONE LEVEL: 12.05 NG/ML
VZV IGG SER QL IA: 2.26

## 2022-03-01 DIAGNOSIS — Z36.89 CONFIRM FETAL CARDIAC ACTIVITY USING ULTRASOUND: Primary | ICD-10-CM

## 2022-03-04 ENCOUNTER — HOSPITAL ENCOUNTER (OUTPATIENT)
Dept: ULTRASOUND IMAGING | Age: 37
Discharge: HOME | End: 2022-03-04
Payer: COMMERCIAL

## 2022-03-04 DIAGNOSIS — Z36.89 CONFIRM FETAL CARDIAC ACTIVITY USING ULTRASOUND: ICD-10-CM

## 2022-03-04 PROCEDURE — 76817 TRANSVAGINAL US OBSTETRIC: CPT

## 2022-03-08 ENCOUNTER — OFFICE VISIT (OUTPATIENT)
Dept: PRIMARY CARE CLINIC | Age: 37
End: 2022-03-08
Payer: COMMERCIAL

## 2022-03-08 VITALS
TEMPERATURE: 97.3 F | BODY MASS INDEX: 23.56 KG/M2 | WEIGHT: 138 LBS | HEART RATE: 81 BPM | HEIGHT: 64 IN | DIASTOLIC BLOOD PRESSURE: 70 MMHG | SYSTOLIC BLOOD PRESSURE: 112 MMHG | OXYGEN SATURATION: 98 %

## 2022-03-08 DIAGNOSIS — Z76.89 ENCOUNTER TO ESTABLISH CARE: Primary | ICD-10-CM

## 2022-03-08 DIAGNOSIS — Z3A.01 LESS THAN 8 WEEKS GESTATION OF PREGNANCY: ICD-10-CM

## 2022-03-08 DIAGNOSIS — Z00.00 ANNUAL PHYSICAL EXAM: ICD-10-CM

## 2022-03-08 PROCEDURE — G8484 FLU IMMUNIZE NO ADMIN: HCPCS | Performed by: NURSE PRACTITIONER

## 2022-03-08 PROCEDURE — 99395 PREV VISIT EST AGE 18-39: CPT | Performed by: NURSE PRACTITIONER

## 2022-03-08 ASSESSMENT — ENCOUNTER SYMPTOMS
RHINORRHEA: 0
SHORTNESS OF BREATH: 0
NAUSEA: 0
VOMITING: 0
COUGH: 0
BACK PAIN: 0
VOICE CHANGE: 0
PHOTOPHOBIA: 0
COLOR CHANGE: 0

## 2022-03-08 ASSESSMENT — PATIENT HEALTH QUESTIONNAIRE - PHQ9
SUM OF ALL RESPONSES TO PHQ QUESTIONS 1-9: 0
SUM OF ALL RESPONSES TO PHQ QUESTIONS 1-9: 0
2. FEELING DOWN, DEPRESSED OR HOPELESS: 0
SUM OF ALL RESPONSES TO PHQ QUESTIONS 1-9: 0
1. LITTLE INTEREST OR PLEASURE IN DOING THINGS: 0
SUM OF ALL RESPONSES TO PHQ QUESTIONS 1-9: 0
SUM OF ALL RESPONSES TO PHQ9 QUESTIONS 1 & 2: 0

## 2022-03-08 NOTE — PROGRESS NOTES
[facility] PRIMARY CARE  [id number] [facility] [address] [address]  Dept: [phone]  Dept Fax: [phone]  Loc: [phone]    Behzad Hernandez is a 31 y.o. female who presents today for her medical conditions/complaints as noted below. Behzad Hernandez is c/o of Establish Care (PCP)      HPI:     HPI   Chief Complaint   Patient presents with   [address]     PCP     Patient presents today to establish care and for annual physical. She has not had a physical in several years. She is transferring care from Dr Garry Saucedo. She is 6 weeks pregnant; her OB/GYN is Dr Sharifa Briseno. She is taking progesterone.      Past Medical History:   Diagnosis Date    Esophageal reflux 10/10/2017    Gallbladder disease     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     Patient tested positive about two weeks ago from today, 16    Postpartum depression     undiagnosed    Prolactin deficiency [facility])       Past Surgical History:   Procedure Laterality Date     SECTION N/A 2021     SECTION performed by [facility]    CHOLECYSTECTOMY, LAPAROSCOPIC N/A 10/13/2016    CHOLECYSTECTOMY LAPAROSCOPIC performed by Kenna Stephens MD at [place] [address] TYMPANOSTOMY TUBE PLACEMENT         Vitals 3/8/2022 2022 2021 2021 2021 1981   SYSTOLIC 217 [phone] - -   DIASTOLIC 70 85 82 66 - -   Site - - Left Upper Arm Right Upper Arm - -   Position - - Sitting Sitting - -   Cuff Size - - Medium Adult Medium Adult - -   Pulse 81 97 66 75 - -   Temp 97.3 - - 98.7 - -   Resp - - - - - -   SpO2 98 - - - - -   Weight 138 lb 139 lb 129 lb 131 lb - -   Height 5' 4\" - 5' 4\" 5' 4\" - -   Body mass index 23.69 kg/m2 - 22.14 kg/m2 22.48 kg/m2 - -   Pain Level - - - - 1 4   Some recent data might be hidden       Family History   Problem Relation Age of Onset    Hypertension Mother     Diabetes Father     Hypertension Father     Alzheimer's Disease Maternal Grandmother     Diabetes Maternal Grandmother     Heart Disease Maternal Grandfather        Social History     Tobacco Use    Smoking status: Never Smoker    Smokeless tobacco: Never Used   Substance Use Topics    Alcohol use: No      Current Outpatient Medications on File Prior to Visit   Medication Sig Dispense Refill    progesterone (PROMETRIUM) 200 MG CAPS capsule Take 1 capsule by mouth nightly Until the end of the 12th week of pregnancy. 30 capsule 3    loratadine (CLARITIN) 10 MG capsule Take 10 mg by mouth daily      CALCIUM PO Take by mouth      Prenatal MV-Min-Fe Fum-FA-DHA (PRENATAL 1 PO) Take by mouth       No current facility-administered medications on file prior to visit. No Known Allergies    Health Maintenance   Topic Date Due    Hepatitis C screen  Never done    Varicella vaccine (1 of 2 - 2-dose childhood series) Never done    Depression Screen  03/08/2023    Cervical cancer screen  08/30/2024    DTaP/Tdap/Td vaccine (3 - Td or Tdap) 03/17/2031    Flu vaccine  Completed    COVID-19 Vaccine  Completed    HIV screen  Completed    Hepatitis A vaccine  Aged Out    Hepatitis B vaccine  Aged Out    Hib vaccine  Aged Out    Meningococcal (ACWY) vaccine  Aged Out    Pneumococcal 0-64 years Vaccine  Aged Out       Subjective:      Review of Systems   Constitutional: Negative for chills and fever. HENT: Negative for ear pain, hearing loss, rhinorrhea and voice change. Eyes: Negative for photophobia and visual disturbance. Respiratory: Negative for cough and shortness of breath. Cardiovascular: Negative for chest pain and palpitations. Gastrointestinal: Negative for nausea and vomiting. Endocrine: Negative. Negative for cold intolerance and heat intolerance. Genitourinary: Negative for difficulty urinating and flank pain. Musculoskeletal: Negative for back pain and neck pain. Skin: Negative for color change and rash.    Allergic/Immunologic: Negative for environmental allergies and food allergies. Neurological: Negative for dizziness, speech difficulty and headaches. Hematological: Does not bruise/bleed easily. Psychiatric/Behavioral: Negative for sleep disturbance and suicidal ideas. Objective:     Physical Exam  Vitals and nursing note reviewed. Constitutional:       Appearance: She is well-developed. HENT:      Head: Atraumatic. Right Ear: External ear normal.      Left Ear: External ear normal.      Nose: Nose normal.   Eyes:      Conjunctiva/sclera: Conjunctivae normal.      Pupils: Pupils are equal, round, and reactive to light. Cardiovascular:      Rate and Rhythm: Normal rate and regular rhythm. Heart sounds: Normal heart sounds, S1 normal and S2 normal.   Pulmonary:      Effort: Pulmonary effort is normal.      Breath sounds: Normal breath sounds. Abdominal:      General: Bowel sounds are normal.      Palpations: Abdomen is soft. Musculoskeletal:         General: Normal range of motion. Cervical back: Normal range of motion and neck supple. Skin:     General: Skin is warm and dry. Neurological:      Mental Status: She is alert and oriented to person, place, and time. Psychiatric:         Behavior: Behavior normal.       /70   Pulse 81   Temp 97.3 °F (36.3 °C) (Temporal)   Ht 5' 4\" (1.626 m)   Wt 138 lb (62.6 kg)   LMP 01/08/2022   SpO2 98%   BMI 23.69 kg/m²     Assessment:       Diagnosis Orders   1. Encounter to establish care     2. Less than 8 weeks gestation of pregnancy     3. Annual physical exam           Plan:     Follow-up for physical exam in 1 year. Fasting labs ordered.      PDMP Monitoring:    Last PDMP Isaiah Orozco [facility]):  Review User Review Instant Review Result            Urine Drug Screenings (1 yr)     Urine Drug Screen  Collected: 5/4/2021  6:12 AM (Final result)    Complete Results          Urine Drug Screen  Collected: 12/27/2018  8:05 PM (Final result)    Complete Results Medication Contract and Consent for Opioid Use Documents Filed      No documents found                 Patient given educational materials -see patient instructions. Discussed use, benefit, and side effects of prescribed medications. All patient questions answered. Pt voiced understanding. Reviewed health maintenance. Instructed to continue currentmedications, diet and exercise. Patient agreed with treatment plan. Follow up as directed. MEDICATIONS:  No orders of the defined types were placed in this encounter. ORDERS:  No orders of the defined types were placed in this encounter. Follow-up:  Return in about 1 year (around 3/8/2023) for physical.    PATIENT INSTRUCTIONS:  There are no Patient Instructions on file for this visit. Electronically signed by DIANA Mayberry on 3/8/2022 at 11:31 AM    EMR Dragon/transcription disclaimer:  Much of thisencounter note is electronic transcription/translation of spoken language to printed texts. The electronic translation of spoken language may be erroneous, or at times, nonsensical words or phrases may be inadvertentlytranscribed.   Although I have reviewed the note for such errors, some may still exist.

## 2022-03-18 DIAGNOSIS — Z3A.01 LESS THAN 8 WEEKS GESTATION OF PREGNANCY: ICD-10-CM

## 2022-03-18 DIAGNOSIS — Z00.00 ANNUAL PHYSICAL EXAM: ICD-10-CM

## 2022-03-18 DIAGNOSIS — Z76.89 ENCOUNTER TO ESTABLISH CARE: ICD-10-CM

## 2022-03-18 LAB
ALBUMIN SERPL-MCNC: 4.3 G/DL (ref 3.5–5.2)
ALP BLD-CCNC: 81 U/L (ref 35–104)
ALT SERPL-CCNC: 12 U/L (ref 5–33)
ANION GAP SERPL CALCULATED.3IONS-SCNC: 12 MMOL/L (ref 7–19)
AST SERPL-CCNC: 13 U/L (ref 5–32)
BASOPHILS ABSOLUTE: 0 K/UL (ref 0–0.2)
BASOPHILS RELATIVE PERCENT: 0.5 % (ref 0–1)
BILIRUB SERPL-MCNC: 0.6 MG/DL (ref 0.2–1.2)
BUN BLDV-MCNC: 9 MG/DL (ref 6–20)
CALCIUM SERPL-MCNC: 9.4 MG/DL (ref 8.6–10)
CHLORIDE BLD-SCNC: 102 MMOL/L (ref 98–111)
CHOLESTEROL, TOTAL: 124 MG/DL (ref 160–199)
CO2: 24 MMOL/L (ref 22–29)
CREAT SERPL-MCNC: 0.5 MG/DL (ref 0.5–0.9)
EOSINOPHILS ABSOLUTE: 0.2 K/UL (ref 0–0.6)
EOSINOPHILS RELATIVE PERCENT: 2.2 % (ref 0–5)
GFR AFRICAN AMERICAN: >59
GFR NON-AFRICAN AMERICAN: >60
GLUCOSE BLD-MCNC: 92 MG/DL (ref 74–109)
HBA1C MFR BLD: 4.9 % (ref 4–6)
HCT VFR BLD CALC: 43.8 % (ref 37–47)
HDLC SERPL-MCNC: 41 MG/DL (ref 65–121)
HEMOGLOBIN: 14.6 G/DL (ref 12–16)
IMMATURE GRANULOCYTES #: 0 K/UL
LDL CHOLESTEROL CALCULATED: 73 MG/DL
LYMPHOCYTES ABSOLUTE: 1.7 K/UL (ref 1.1–4.5)
LYMPHOCYTES RELATIVE PERCENT: 22.3 % (ref 20–40)
MCH RBC QN AUTO: 28.4 PG (ref 27–31)
MCHC RBC AUTO-ENTMCNC: 33.3 G/DL (ref 33–37)
MCV RBC AUTO: 85.2 FL (ref 81–99)
MONOCYTES ABSOLUTE: 0.5 K/UL (ref 0–0.9)
MONOCYTES RELATIVE PERCENT: 6.5 % (ref 0–10)
NEUTROPHILS ABSOLUTE: 5.1 K/UL (ref 1.5–7.5)
NEUTROPHILS RELATIVE PERCENT: 68.2 % (ref 50–65)
PDW BLD-RTO: 12.3 % (ref 11.5–14.5)
PLATELET # BLD: 207 K/UL (ref 130–400)
PMV BLD AUTO: 8.6 FL (ref 9.4–12.3)
POTASSIUM SERPL-SCNC: 4 MMOL/L (ref 3.5–5)
RBC # BLD: 5.14 M/UL (ref 4.2–5.4)
SODIUM BLD-SCNC: 138 MMOL/L (ref 136–145)
TOTAL PROTEIN: 6.8 G/DL (ref 6.6–8.7)
TRIGL SERPL-MCNC: 52 MG/DL (ref 0–149)
TSH SERPL DL<=0.05 MIU/L-ACNC: 0.72 UIU/ML (ref 0.27–4.2)
VITAMIN D 25-HYDROXY: 33.7 NG/ML
WBC # BLD: 7.4 K/UL (ref 4.8–10.8)

## 2022-03-20 NOTE — PATIENT INSTRUCTIONS
Patient Education        Weeks 6 to 10 of Your Pregnancy: Care Instructions  Overview     During the first 6 to 10 weeks of your pregnancy, your body goes through many changes. Your baby grows very quickly, even though you can't feel it yet. You may start to feel different, both in your body and your emotions. Because each pregnancy is unique, there's no right way to feel. You may feel the healthiest you've ever been, or you might feel tired or sick to your stomach (\"morning sickness\"). These early weeks are a time to make healthy choices and to eat the best foods for you and your baby. This is also a good time to think about birth defects testing. These are tests done during pregnancy to look for possible problems with the baby. First-trimester tests for birth defects can be done between 8 and 13 weeks of pregnancy, depending on the test. Talk with your doctor about what kinds of tests are available. Follow-up care is a key part of your treatment and safety. Be sure to make and go to all appointments, and call your doctor if you are having problems. It's also a good idea to know your test results and keep a list of the medicines you take. How can you care for yourself at home? Eat well  · Eat at least 3 meals and 2 healthy snacks every day. Eat fresh, whole foods, including:  ? 7 or more servings of bread, tortillas, cereal, rice, pasta, or oatmeal.  ? 3 or more servings of vegetables, especially leafy green vegetables. ? 2 or more servings of fruits. ? 3 or more servings of milk, yogurt, or cheese. ? 2 or more servings of meat, turkey, chicken, fish, eggs, or dried beans. · Drink plenty of fluids, especially water. Avoid sodas and other sweetened drinks. · Choose foods that have important vitamins for your baby, such as calcium, iron, and folate.   ? Dairy products, tofu, canned fish with bones, almonds, broccoli, dark leafy greens, corn tortillas, and fortified orange juice are good sources of calcium. ? Beef, poultry, liver, spinach, lentils, dried beans, fortified cereals, and dried fruits are rich in iron. ? Dark leafy greens, broccoli, asparagus, liver, fortified cereals, orange juice, peanuts, and almonds are good sources of folate. · Avoid foods that could harm your baby. ? Do not eat raw or undercooked meat, chicken, or fish (such as sushi or raw oysters). ? Do not eat raw eggs or foods that contain raw eggs, such as Caesar dressing. ? Do not eat soft cheeses and unpasteurized dairy foods, such as Brie, feta, or blue cheese. ? Do not eat fish that contains a lot of mercury, such as shark, swordfish, tilefish, or shabana mackerel. Limit some other types of fish, such as white (albacore) tuna, to 4 oz (0.1 kg) a week. ? Do not eat raw sprouts, especially alfalfa sprouts. ? Cut down on caffeine, such as coffee, tea, and cola. Protect yourself and your baby  · Do not touch roger litter or cat feces. They can cause an infection that could harm your baby. · High body temperature can be harmful to your baby. So if you want to use a sauna or hot tub, be sure to talk to your doctor about how to use it safely. [place] with morning sickness  · Sip small amounts of water, juices, or shakes. Try drinking between meals, not with meals. · Eat 5 or 6 small meals a day. Try dry toast or crackers when you first get up, and eat breakfast a little later. · Avoid spicy, greasy, and fatty foods. · When you feel sick, open your windows or go for a short walk to get fresh air. · Try nausea wristbands. These help some people. · Tell your doctor if you think your prenatal vitamins make you sick. Where can you learn more? Go to https://alba.healthSociagram.com. org and sign in to your ZestFinance account. Enter G112 in the Jumbas box to learn more about \"Weeks 6 to 10 of Your Pregnancy: Care Instructions. \"     If you do not have an account, please click on the \"Sign Up Now\" link.   Current as of: June 16, 2021               Content Version: 13.1  © 2006-2021 Healthwise, Luc[employer]. Care instructions adapted under license by [facility] ([facility]). If you have questions about a medical condition or this instruction, always ask your healthcare professional. [address] any warranty or liability for your use of this information. Patient Education        Nutrition During Pregnancy: Care Instructions  Overview     Healthy eating when you are pregnant is important for you and your baby. It can help you feel well and have a successful pregnancy and delivery. During pregnancy your nutrition needs increase. Even if you have excellent eating habits, your doctor may recommend a multivitamin to make sure you get enough iron and folic acid. You may wonder how much weight you should gain. In general, if you were at a healthy weight before you became pregnant, then you should gain between 25 and 35 pounds. If you were overweight before pregnancy, then you'll likely be advised to gain 15 to 25 pounds. If you were underweight before pregnancy, then you'll probably be advised to gain 28 to 40 pounds. Your doctor will work with you to set a weight goal that is right for you. Gaining a healthy amount of weight helps you have a healthy baby. Follow-up care is a key part of your treatment and safety. Be sure to make and go to all appointments, and call your doctor if you are having problems. It's also a good idea to know your test results and keep a list of the medicines you take. How can you care for yourself at home? · Eat plenty of fruits and vegetables. Include a variety of orange, yellow, and leafy dark-green vegetables every day. · Choose whole-grain bread, cereal, and pasta. Good choices include whole wheat bread, whole wheat pasta, brown rice, and oatmeal.  · Get 4 or more servings of milk and milk products each day. Good choices include nonfat or low-fat milk, yogurt, and cheese.  If you cannot eat milk products, you can get calcium from calcium-fortified products such as orange juice, soy milk, and tofu. Other non-milk sources of calcium include leafy green vegetables, such as broccoli, kale, mustard greens, turnip greens, bok osman, and brussels sprouts. · If you eat meat, pick lower-fat types. Good choices include lean cuts of meat and chicken or turkey without the skin. · Do not eat shark, swordfish, shabana mackerel, or tilefish. They have high levels of mercury, which is dangerous to your baby. You can eat up to 12 ounces a week of fish or shellfish that have low mercury levels. Good choices include shrimp, wild salmon, pollock, and catfish. Limit some other types of fish, such as white (albacore) tuna, to 4 oz (0.1 kg) a week. · Heat lunch meats (such as turkey, ham, or bologna) to 165°F before you eat them. This reduces your risk of getting sick from a kind of bacteria that can be found in lunch meats. · Do not eat unpasteurized soft cheeses, such as brie, feta, fresh mozzarella, and blue cheese. They have a bacteria that could harm your baby. · Limit caffeine. If you drink coffee or tea, have no more than 1 cup a day. Caffeine is also found in stacy. · Do not drink any alcohol. No amount of alcohol has been found to be safe during pregnancy. · Do not diet or try to lose weight. For example, do not follow a low-carbohydrate diet. If you are overweight at the start of your pregnancy, your doctor will work with you to manage your weight gain. · Tell your doctor about all vitamins and supplements you take. When should you call for help? Watch closely for changes in your health, and be sure to contact your doctor if you have any problems. Where can you learn more? Go to https://chmelissa.healthSo Protect Me. org and sign in to your Big Live account. Enter Y785 in the Applied X-rad Technology box to learn more about \"Nutrition During Pregnancy: Care Instructions. \"     If you do not have an account, please click on the \"Sign Up Now\" link. Current as of: 2021               Content Version: 13.1   SMASHsolar. Care instructions adapted under license by [facility] ([facility]). If you have questions about a medical condition or this instruction, always ask your healthcare professional. [address] any warranty or liability for your use of this information. Patient Education        Pregnancy Precautions: Care Instructions  Your Care Instructions     There is no sure way to prevent labor before your due date ( labor) or to prevent most other pregnancy problems. But there are things you can do to increase your chances of a healthy pregnancy. Go to your appointments, follow your doctor's advice, and take good care of yourself. Eat well, and exercise (if your doctor agrees). And make sure to drink plenty of water. Follow-up care is a key part of your treatment and safety. Be sure to make and go to all appointments, and call your doctor if you are having problems. It's also a good idea to know your test results and keep a list of the medicines you take. How can you care for yourself at home? · Make sure you go to your prenatal appointments. At each visit, your doctor will check your blood pressure. Your doctor will also check to see if you have protein in your urine. High blood pressure and protein in urine are signs of preeclampsia. This condition can be dangerous for you and your baby. · Drink plenty of fluids. Dehydration can cause contractions. If you have kidney, heart, or liver disease and have to limit fluids, talk with your doctor before you increase the amount of fluids you drink. · Tell your doctor right away if you notice any symptoms of an infection, such as:  ? Burning when you urinate. ? A foul-smelling discharge from your vagina. ? Vaginal itching. ? Unexplained fever. ?  Unusual pain or soreness in your uterus or lower belly.  · Eat a balanced diet. Include plenty of foods that are high in calcium and iron. ? Foods high in calcium include milk, cheese, yogurt, almonds, and broccoli. ? Foods high in iron include red meat, shellfish, poultry, eggs, beans, raisins, whole-grain bread, and leafy green vegetables. · Do not smoke. If you need help quitting, talk to your doctor about stop-smoking programs and medicines. These can increase your chances of quitting for good. · Do not drink alcohol or use marijuana or illegal drugs. · Follow your doctor's directions about activity. Your doctor will let you know how much, if any, exercise you can do. · Ask your doctor if you can have sex. If you are at risk for early labor, your doctor may ask you to not have sex. · Take care to prevent falls. During pregnancy, your joints are loose, and your balance is off. Sports such as bicycling, skiing, or in-line skating can increase your risk of falling. And don't ride horses or motorcycles, dive, water ski, scuba dive, or parachute jump while you are pregnant. · Avoid getting very hot. Do not use saunas or hot tubs. Avoid staying out in the sun in hot weather for long periods. Take acetaminophen (Tylenol) to lower a high fever. · Do not take any over-the-counter or herbal medicines or supplements without talking to your doctor or pharmacist first.  When should you call for help? Call 911  anytime you think you may need emergency care. For example, call if:    · You passed out (lost consciousness).     · You have a seizure.     · You have severe vaginal bleeding.     · You have severe pain in your belly or pelvis.     · You have had fluid gushing or leaking from your vagina and you know or think the umbilical cord is bulging into your vagina. If this happens, immediately get down on your knees so your rear end (buttocks) is higher than your head. This will decrease the pressure on the cord until help arrives.    Call your doctor now or seek immediate medical care if:    · You have signs of preeclampsia, such as:  ? Sudden swelling of your face, hands, or feet. ? New vision problems (such as dimness, blurring, or seeing spots). ? A severe headache.     · You have any vaginal bleeding.     · You have belly pain or cramping.     · You have a fever.     · You have had regular contractions (with or without pain) for an hour. This means that you have 8 or more within 1 hour or 4 or more in 20 minutes after you change your position and drink fluids.     · You have a sudden release of fluid from your vagina.     · You have low back pain or pelvic pressure that does not go away.     · You notice that your baby has stopped moving or is moving much less than normal.   Watch closely for changes in your health, and be sure to contact your doctor if you have any problems. Where can you learn more? Go to https://chpepicewtony.mytrax. org and sign in to your orangutrans account. Enter [id number] in the PhotoMania box to learn more about \"Pregnancy Precautions: Care Instructions. \"     If you do not have an account, please click on the \"Sign Up Now\" link. Current as of: June 16, 2021               Content Version: 13.1  © 2006-2021 [email or web address]. Care instructions adapted under license by [facility] [place] ([facility]). If you have questions about a medical condition or this instruction, always ask your healthcare professional. [place] [address] any warranty or liability for your use of this information. Patient Education        Learning About Prenatal Visits  Your Care Instructions     Regular prenatal visits are very important during any pregnancy. These quick office visits may seem simple and routine. But they can help you and your baby stay healthy. Your doctor is watching for problems that can only be found by regularly checking you and your baby. The visits also give you and your doctor time to build a good relationship.   Many women have prenatal visits every 4 to 6 weeks until week 28 of pregnancy. Then the visits become more frequent. This is often every 2 to 3 weeks through week 36 of pregnancy. In the final month of pregnancy, you likely will see your doctor every week. You may have a different schedule if you have a medical problem or are a teen. At different times in your pregnancy, you will have exams and tests. Some are routine. Others are done only when there is a chance of a problem. Everything healthy you do for your body helps your growing baby. Rest when you need it. Eat well, drink plenty of water, and exercise regularly. What happens during a prenatal visit? · You will have blood pressure checks, along with urine tests. You also may have blood tests. If you need to go to the bathroom while waiting for the doctor, tell the nurse. He or she will give you a sample cup so your urine can be tested. · You will be weighed and have your belly measured. · Your doctor may listen to your baby's heartbeat with a special stethoscope. · In your second trimester, your doctor will check your blood sugar (glucose tolerance test) for diabetes that can occur during pregnancy. This is gestational diabetes, which can harm your baby. · You will have tests to check for infections that could harm your . These include group B streptococcus and hepatitis B.  · Your doctor may do ultrasounds to check for problems. This also checks your baby's position. An ultrasound uses sound waves to produce a picture of your baby. · You may have other tests at any time during your pregnancy. · Use your visits to discuss with your doctor any concerns you have. How can you care for yourself at home? · Get plenty of rest.  · Exercise every day, if your doctor says it is okay. If you have not exercised in the past, start out slowly. Take many short walks each day. · Eat a balanced diet.  Make sure your diet includes plenty of beans, peas, and leafy green vegetables. · Drink plenty of fluids. Cut down on drinks with caffeine, such as coffee, tea, and cola. If you have kidney, heart, or liver disease and have to limit fluids, talk with your doctor before you increase the amount of fluids you drink. · Avoid chemical fumes, paint fumes, and poisons. Do not use alcohol, marijuana, or illegal drugs. Do not smoke, vape, or use tobacco. If you need help quitting, talk to your doctor about stop-smoking programs and medicines. These can increase your chances of quitting for good. · Review all of your medicines with your doctor. Some of your routine medicines may need to be changed to protect your baby. Do not stop or start taking any medicines without talking to your doctor first.  Follow-up care is a key part of your treatment and safety. Be sure to make and go to all appointments, and call your doctor if you are having problems. It's also a good idea to know your test results and keep a list of the medicines you take. Where can you learn more? Go to https://DNAtriXpePicRate.Me.immoture.be. org and sign in to your DocsInk account. Enter J502 in the Boxstar Media box to learn more about \"Learning About Prenatal Visits. \"     If you do not have an account, please click on the \"Sign Up Now\" link. Current as of: June 16, 2021               Content Version: 13.1  © 4610-1588 Healthwise, Incorporated. Care instructions adapted under license by [facility] ([facility]). If you have questions about a medical condition or this instruction, always ask your healthcare professional. [place] [address] any warranty or liability for your use of this information. Patient Education        Managing Morning Sickness: Care Instructions  Overview     Morning sickness can be the toughest part of early pregnancy. Some people feel mildly sick to their stomach, and others are running to the bathroom. The good news?  Morning sickness usually gets better in the second trimester. It's likely that your hormones are to blame for morning sickness. But you can do things to feel better, like changing what you eat, avoiding certain foods and smells, and asking your doctor about medicines you can try. Follow-up care is a key part of your treatment and safety. Be sure to make and go to all appointments, and call your doctor if you are having problems. It's also a good idea to know your test results and keep a list of the medicines you take. How can you care for yourself at home? · Keep food in your stomach, but not too much at once. Your nausea may be worse if your stomach is empty. Eat five or six small meals a day instead of three large meals. · For morning nausea, eat a small snack, such as a couple of crackers or dry biscuits, before rising. Allow a few minutes for your stomach to settle before you get out of bed slowly. · Drink plenty of fluids. If you have kidney, heart, or liver disease and have to limit fluids, talk with your doctor before you increase the amount of fluids you drink. Some women find that peppermint tea helps with nausea. · Eat more protein, such as chicken, fish, lean meat, beans, nuts, and seeds. · Eat carbohydrate foods, such as potatoes, whole-grain cereals, rice, and pasta. · Avoid smells and foods that make you feel nauseated. Spicy or high-fat foods, citrus juice, milk, coffee, and tea with caffeine often make nausea worse. · Do not drink alcohol. · Do not smoke. Try not to be around others who smoke. If you need help quitting, talk to your doctor about stop-smoking programs and medicines. These can increase your chances of quitting for good. · If you are taking iron supplements, ask your doctor if they are necessary. Iron can make nausea worse. · Get lots of rest. Stress and fatigue can make your morning sickness worse.   · Ask your doctor about taking prescription medicine, or over-the-counter products such as vitamin B6, doxylamine, or yosi, to relieve your symptoms. Your doctor can tell you the doses that are safe for you. · Take your prenatal vitamins at night on a full stomach. When should you call for help? Call 911 anytime you think you may need emergency care. For example, call if:    · You passed out (lost consciousness). Call your doctor now or seek immediate medical care if:    · You are sick to your stomach or cannot drink fluids.     · You have symptoms of dehydration, such as:  ? Dry eyes and a dry mouth. ? Passing only a little urine. ? Feeling thirstier than usual.     · You are not able to keep down your medicine.     · You have pain in your belly or pelvis. Watch closely for changes in your health, and be sure to contact your doctor if:    · You do not get better as expected. Where can you learn more? Go to https://chpepiceweb.Aviary. org and sign in to your Capillary Technologies account. Enter Z296 in the Front App box to learn more about \"Managing Morning Sickness: Care Instructions. \"     If you do not have an account, please click on the \"Sign Up Now\" link. Current as of: June 16, 2021               Content Version: 13.1  © 4044-4690 Granular. Care instructions adapted under license by [facility] ([facility]). If you have questions about a medical condition or this instruction, always ask your healthcare professional. [place] [address] any warranty or liability for your use of this information. Patient Education        Screening Tests for Birth Defects: Care Instructions  Your Care Instructions     Screening tests for birth defects are done during pregnancy to look for possible problems with the baby (fetus). They show the chance that a baby has a certain birth defect. Down syndrome, spina bifida, and trisomy 25 are examples. There are many types of screening tests you may have during your pregnancy.  During your first trimester you may have:  · Blood tests at 10 to 13 weeks.  · Nuchal translucency test at 11 to 14 weeks. · Cell free fetal DNA test at 10 weeks or later. During your second trimester, you may have:  · Triple or quad screening at 15 to 20 weeks. · Ultrasound at 18 to 20 weeks. Follow-up care is a key part of your treatment and safety. Be sure to make and go to all appointments, and call your doctor if you are having problems. It's also a good idea to know your test results and keep a list of the medicines you take. Why are these tests done? Blood tests measure the amounts of certain substances in your blood. For these tests, a health professional takes a sample of your blood. Cell free fetal DNA test is used to help find genetic problems. Triple or quad screening are blood tests that can be used to find out if there is a risk of certain health problems. If any of these tests point to a problem, your doctor will suggest other tests to find out for sure if there is a problem. Nuchal translucency test uses ultrasound to measure the thickness of the area at the back of the baby's neck. An increase in thickness can be an early sign of certain birth defects. Ultrasound is a tool that uses sound waves to make pictures of your baby and placenta inside the uterus. Ultrasound lets your doctor see an image of your baby. It can help your doctor look for problems of the heart, spine, belly, or other areas. Many pregnant women choose to have these tests done as a routine part of their care. Some choose to have tests if they are at higher risk for having a baby with a birth defect. What happens after testing? · You can return right away to your usual activities for this stage of pregnancy, unless your doctor gives you different instructions. · If you are concerned or worried about the results of your tests, talk with loved ones or friends. You can also talk to a genetic counselor or your doctor. When should you call for help?   Watch closely for changes in your health, and be sure to contact your doctor if you have any problems. Where can you learn more? Go to https://chpepiceweb.Optisense. org and sign in to your InLive Interactive account. Enter 269 [id number] in the Pipette box to learn more about \"Screening Tests for Birth Defects: Care Instructions. \"     If you do not have an account, please click on the \"Sign Up Now\" link. Current as of: June 16, 2021               Content Version: 13.1  © 2006-2021 Healthwise, Incorporated. Care instructions adapted under license by [facility] ([facility]). If you have questions about a medical condition or this instruction, always ask your healthcare professional. [address] any warranty or liability for your use of this information.

## 2022-03-22 ENCOUNTER — INITIAL PRENATAL (OUTPATIENT)
Dept: OBGYN CLINIC | Age: 37
End: 2022-03-22

## 2022-03-22 VITALS
SYSTOLIC BLOOD PRESSURE: 110 MMHG | DIASTOLIC BLOOD PRESSURE: 76 MMHG | HEART RATE: 92 BPM | BODY MASS INDEX: 23.17 KG/M2 | WEIGHT: 135 LBS

## 2022-03-22 DIAGNOSIS — Z3A.08 8 WEEKS GESTATION OF PREGNANCY: ICD-10-CM

## 2022-03-22 DIAGNOSIS — Z36.9 ANTENATAL SCREENING ENCOUNTER: ICD-10-CM

## 2022-03-22 DIAGNOSIS — Z98.891 HISTORY OF CESAREAN SECTION, LOW TRANSVERSE: Primary | ICD-10-CM

## 2022-03-22 PROBLEM — Z3A.41 POST TERM PREGNANCY AT 41 WEEKS GESTATION: Status: RESOLVED | Noted: 2021-05-04 | Resolved: 2022-03-22

## 2022-03-22 PROBLEM — O48.0 POST TERM PREGNANCY AT 41 WEEKS GESTATION: Status: RESOLVED | Noted: 2021-05-04 | Resolved: 2022-03-22

## 2022-03-22 PROCEDURE — 0502F SUBSEQUENT PRENATAL CARE: CPT | Performed by: ADVANCED PRACTICE MIDWIFE

## 2022-03-22 NOTE — PROGRESS NOTES
Pt denies any vaginal leaking bleeding or contractions. Patient desires genetic testing with next appointment. Didn't do it with other pregnancies, but since she will be AMA and she is concerned about her progesterone being low and having to take progesterone and her thinking she was further along than she is measuring. She has a feeling she is going to lose this pregnancy. Patient desires  with Saurabh Robbins, patient delivered in 2021 by primary L-Trans C/S. Patient wants to discuss the risks with doing . Did not like being unconscious for last delivery. Obviously the baby's safety is primary concern but just wants to discuss with Saurabh Robbins and see what she thinks.

## 2022-03-22 NOTE — PROGRESS NOTES
CNM Prenatal Office Note  Subjective:  Jonah English is here for initial obstetrical visit. She has had confirmation of pregnancy. Her history has been reviewed and OB complications identified. Today she is 8w4d weeks EGA. She is doing well and has no unusual complaints. She does not have n/v/d/c, vaginal bleeding, or other common first trimester complaints. Objective: Mother's Prenatal Vitals  BP: 110/76  Weight: 135 lb (61.2 kg)  Pulse: 92  Patient Position: Sitting  Prenatal Fetal Information  Fetal Heart Rate:   Movement: Absent  Pt is A&Ox3, in no acute distress. Normocephalic, atraumatic. PERRL. Resp even and non-labored. Skin pink, warm & dry. Gravid abdomen. VELEZ's well. Gait steady. Assessment:    IUP at 8w4d wks      Diagnosis Orders   1. History of  section, low transverse     2. 8 weeks gestation of pregnancy     3.  screening encounter  Hepatitis C Antibody     Plan:   Pt counseled on balanced nutrition, adequate fluid intake, taking PNV daily, and exercise. Continue with routine prenatal care. RTC in 4 wks for prenatal visit  Results reviewed with pt, planning genetic screening in 4 weeks    MEDICATIONS:  No orders of the defined types were placed in this encounter.     ORDERS:  Orders Placed This Encounter   Procedures    Hepatitis C Antibody

## 2022-04-01 ENCOUNTER — TELEPHONE (OUTPATIENT)
Dept: PRIMARY CARE CLINIC | Age: 37
End: 2022-04-01

## 2022-04-01 NOTE — TELEPHONE ENCOUNTER
----- Message from DIANA Tang sent at 3/31/2022  3:53 PM CDT -----  Please inform patient of normal lab results. Thank you!

## 2022-04-14 NOTE — PATIENT INSTRUCTIONS
Patient Education        Weeks 10 to 14 of Your Pregnancy: Care Instructions  Overview     By weeks 10 to 15 of your pregnancy, the placenta has formed inside your uterus. The placenta's main job is to give your baby oxygen and nutrientsthrough the umbilical cord. It's possible to hear your baby's heartbeat with a special ultrasound device. Your baby's organs are developing. The arms and legs can bend. This is a good time to think about testing for birth defects. There are two types of tests: screening and diagnostic. Screening tests show the chance that a baby has a certain birth defect. They can't tell you for sure that your babyhas a problem. Diagnostic tests show if a baby has a certain birth defect. It's your choice whether to have these tests. You and your partner can talk toyour doctor or midwife about tests for birth defects. Follow-up care is a key part of your treatment and safety. Be sure to make and go to all appointments, and call your doctor if you are having problems. It's also a good idea to know your test results and keep alist of the medicines you take. How can you care for yourself at home? Decide about tests   You can have screening tests and diagnostic tests to check for birth defects. The decision to have a test for birth defects is personal. Think about your age, your chance of passing on a family disease, your need to know about any problems, and what you might do after you have the test results. ? Quadruple (quad) blood test. This screening test can be done between 15 and 22 weeks of pregnancy. It checks the amount of four substances in your blood. The doctor looks at these test results, along with your age and other factors, to find out the chance that your baby may have certain problems. ? Amniocentesis. This diagnostic test is used to look for chromosomal problems in the baby's cells.  It can be done between 15 and 20 weeks of pregnancy, usually around week 16.  ? Nuchal translucency test. This test uses ultrasound to measure the thickness of the area at the back of the baby's neck. An increase in the thickness can be an early sign of Down syndrome. ? Chorionic villus sampling (CVS). This is a test that looks for certain genetic problems with your baby. The same genes that are in your baby are in the placenta. A small piece of the placenta is taken out and tested. This test is done when you are 10 to 13 weeks pregnant. Ease discomfort   Slow down and take naps when you feel tired.  If your emotions swing, talk to someone.  If your gums bleed, try a softer toothbrush. If your gums are puffy and bleed a lot, see your dentist.  Tamica Montiel If you feel dizzy:  ? Get up slowly after sitting or lying down. ? Drink plenty of fluids. ? Eat small snacks to keep your blood sugar stable. ? Put your head between your legs as though you were tying your shoelaces. ? Lie down with your legs higher than your head. Use pillows to prop up your feet.  If you have a headache:  ? Lie down. ? Ask your partner or a good friend for a neck massage. ? Try cool cloths over your forehead or across the back of your neck. ? Use acetaminophen (Tylenol) for pain relief. Do not use nonsteroidal anti-inflammatory drugs (NSAIDs), such as ibuprofen (Advil, Motrin) or naproxen (Aleve), unless your doctor says it is okay.  If you have a nosebleed, pinch your nose gently, and hold it for a short while. To prevent nosebleeds, try massaging a small dab of petroleum jelly, such as Vaseline, in your nostrils.  If your nose is stuffed up, try saline (saltwater) nose sprays. Do not use decongestant sprays. Care for your breasts   Wear a bra that gives you good support.  Know that changes in your breasts are normal.  ? Your breasts may get larger and more tender. Tenderness usually gets better by 12 weeks. ? Your nipples may get darker and larger, and small bumps around your nipples may show more. ?  The veins in your chest and breasts may show more. Where can you learn more? Go to https://chpepiceweb.[email or web address] org and sign in to your ThoughtSpot account. Enter T730 in the Ben[employer]ight box to learn more about \"Weeks 10 to 14 of Your Pregnancy: Care Instructions. \"     If you do not have an account, please click on the \"Sign Up Now\" link. Current as of: June 16, 2021               Content Version: 13.2  © 2006-2022 [employer]. Care instructions adapted under license by [facility] ([facility]). If you have questions about a medical condition or this instruction, always ask your healthcare professional. [address] any warranty or liability for your use of this information. Patient Education        Nutrition During Pregnancy: Care Instructions  Overview     Healthy eating when you are pregnant is important for you and your baby. It can help you feel well and have a successful pregnancy and delivery. During pregnancy your nutrition needs increase. Even if you have excellent eating habits, your doctor may recommend a multivitamin to make sure you get enoughiron and folic acid. You may wonder how much weight you should gain. In general, if you were at a healthy weight before you became pregnant, then you should gain between 25 and 35 pounds. If you were overweight before pregnancy, then you'll likely be advised to gain 15 to 25 pounds. If you were underweight before pregnancy, then you'll probably be advised to gain 28 to 40 pounds. Your doctor will work with you to set a weight goal that is right for you. Gaining a healthy amount ofweight helps you have a healthy baby. Follow-up care is a key part of your treatment and safety. Be sure to make and go to all appointments, and call your doctor if you are having problems. It's also a good idea to know your test results and keep alist of the medicines you take. How can you care for yourself at home?    Eat plenty of fruits and help?  Watch closely for changes in your health, and be sure to contact your doctor if you have any problems. Where can you learn more? Go to https://chpepiceweb.Poacht [email or web address] org and sign in to your Plan[employer]ax account. Enter Y785 in the [employer] box to learn more about \"Nutrition During Pregnancy: Care Instructions. \"     If you do not have an account, please click on the \"Sign Up Now\" link. Current as of: September 8, 2021               Content Version: 13.2  © 4594-7490 Healthwise, Incorporated. Care instructions adapted under license by [facility] ([facility]). If you have questions about a medical condition or this instruction, always ask your healthcare professional. [address] any warranty or liability for your use of this information.

## 2022-04-19 ENCOUNTER — ROUTINE PRENATAL (OUTPATIENT)
Dept: OBGYN CLINIC | Age: 37
End: 2022-04-19

## 2022-04-19 VITALS
WEIGHT: 137 LBS | DIASTOLIC BLOOD PRESSURE: 74 MMHG | BODY MASS INDEX: 23.52 KG/M2 | HEART RATE: 72 BPM | SYSTOLIC BLOOD PRESSURE: 117 MMHG

## 2022-04-19 DIAGNOSIS — Z98.891 HISTORY OF CESAREAN SECTION, LOW TRANSVERSE: ICD-10-CM

## 2022-04-19 DIAGNOSIS — Z34.81 ENCOUNTER FOR SUPERVISION OF OTHER NORMAL PREGNANCY IN FIRST TRIMESTER: ICD-10-CM

## 2022-04-19 DIAGNOSIS — Z3A.12 12 WEEKS GESTATION OF PREGNANCY: Primary | ICD-10-CM

## 2022-04-19 PROCEDURE — 0502F SUBSEQUENT PRENATAL CARE: CPT | Performed by: ADVANCED PRACTICE MIDWIFE

## 2022-04-19 NOTE — PROGRESS NOTES
CNM Prenatal Office Note  Subjective:  Keron Lomax is here for a return obstetrical visit. Today she is 12w4d weeks EGA. She is doing well and has no unusual complaints. She does not have vaginal bleeding, n/v, dizziness, or cramping. Objective: Mother's Prenatal Vitals  BP: 117/74  Weight: 137 lb (62.1 kg)  Pulse: 72  Patient Position: Sitting  Prenatal Fetal Information  Fetal Heart Rate:   Movement: Absent  Pt is A&Ox3, in no acute distress. Normocephalic, atraumatic. PERRL. Resp even and non-labored. Skin pink, warm & dry. Gravid abdomen. VELEZ's well. Gait steady. Assessment:    IUP at 12w4d wks      Diagnosis Orders   1. 12 weeks gestation of pregnancy     2. Encounter for supervision of other normal pregnancy in first trimester     3. History of  section, low transverse       Plan:   Pt counseled on prenatal labs reviewed and Genetic testing   Continue with routine prenatal care.  RTC in 4 wks for prenatal visit    MEDICATIONS:  No orders of the defined types were placed in this encounter. ORDERS:  No orders of the defined types were placed in this encounter. More than 50% of this 20 min visit was education and counseling.

## 2022-04-25 ENCOUNTER — TELEPHONE (OUTPATIENT)
Dept: OBGYN CLINIC | Age: 37
End: 2022-04-25

## 2022-05-17 ENCOUNTER — ROUTINE PRENATAL (OUTPATIENT)
Dept: OBGYN CLINIC | Age: 37
End: 2022-05-17

## 2022-05-17 VITALS
BODY MASS INDEX: 23.52 KG/M2 | DIASTOLIC BLOOD PRESSURE: 65 MMHG | WEIGHT: 137 LBS | SYSTOLIC BLOOD PRESSURE: 100 MMHG | HEART RATE: 90 BPM

## 2022-05-17 DIAGNOSIS — Z34.82 ENCOUNTER FOR SUPERVISION OF OTHER NORMAL PREGNANCY IN SECOND TRIMESTER: ICD-10-CM

## 2022-05-17 DIAGNOSIS — Z98.891 HISTORY OF CESAREAN SECTION, LOW TRANSVERSE: ICD-10-CM

## 2022-05-17 DIAGNOSIS — Z3A.16 16 WEEKS GESTATION OF PREGNANCY: Primary | ICD-10-CM

## 2022-05-17 PROCEDURE — 0502F SUBSEQUENT PRENATAL CARE: CPT | Performed by: ADVANCED PRACTICE MIDWIFE

## 2022-05-17 NOTE — PATIENT INSTRUCTIONS
Patient Education        Weeks 14 to 18 of Your Pregnancy: Care Instructions  Overview     During this time, you may start to \"show,\" so that you look pregnant to people around you. You may also notice some changes in your skin, such as itchy spotson your palms or acne on your face. Your baby is now able to pass urine. And your baby's first stool (meconium) is starting to collect in your baby's intestines. Hair is also starting to grow onyour baby's head. At your next visit, between weeks 18 and 20, your doctor may do an ultrasound test. The test allows your doctor to check for certain problems. Your doctor can also tell the sex of your baby. So this a good time to think about whetheryou want to know. Talk to your doctor about getting a flu shot to help keep you healthy duringyour pregnancy. As your pregnancy moves along, it's common to worry or feel anxious. Your body is changing a lot. And you are thinking about giving birth, the health of your baby, and becoming a parent. You can talk to your doctor about any anxiety andstress you feel. Follow-up care is a key part of your treatment and safety. Be sure to make and go to all appointments, and call your doctor if you are having problems. It's also a good idea to know your test results and keep alist of the medicines you take. How can you care for yourself at home? Reduce stress     Ask for help with cooking and housekeeping.      Figure out who or what causes your stress. Avoid these people or situations as much as possible.      Relax every day. Taking 10- to 15-minute breaks can make a big difference. Take a walk, listen to music, or take a warm bath.      Learn relaxation techniques at prenatal or yoga class. Or buy a relaxation tape.      List your fears about having a baby and becoming a parent. Share the list with someone you trust. Decide which worries are really small, and try to let them go.    Exercise     If you did not exercise much before pregnancy, start slowly. Walking is best. [employer], and do a little more every day.      Brisk walking, easy jogging, low-impact aerobics, water aerobics, and yoga are good choices. Some sports, such as scuba diving, horseback riding, downhill skiing, gymnastics, and water skiing, are not a good idea.      Try to do at least 2½ hours a week of moderate exercise, such as a fast walk. One way to do this is to be active 30 minutes a day, at least 5 days a week.      Wear loose clothing. And wear shoes and a bra that provide good support.      Warm up and cool down to start and finish your exercise.      If you want to use weights, be sure to use light weights. They reduce stress on your joints. Stay at the best weight for you     Experts recommend that you gain about 1 pound a month during the first 3 months of your pregnancy.      Experts recommend that you gain about 1 pound a week during your last 6 months of pregnancy, for a total weight gain of 25 to 35 pounds.      If you are underweight, you will need to gain more weight (about 28 to 40 pounds).      If you are overweight, you may not need to gain as much weight (about 15 to 25 pounds).      If you are gaining weight too fast, use common sense. Exercise every day, and limit sweets, fast foods, and fats. Choose lean meats, fruits, and vegetables.      If you are having twins or more, your doctor may refer you to a dietitian. Where can you learn more? Go to https://FlexisjenniferMixGenius.Sport Street. org and sign in to your DocuSign account. Enter X398 in the [facility] box to learn more about \"Weeks 14 to 18 of Your Pregnancy: Care Instructions. \"     If you do not have an account, please click on the \"Sign Up Now\" link. Current as of: June 16, 2021               Content Version: 13.2  © 1764-4388 Healthwise, Incorporated. Care instructions adapted under license by [facility] ([facility]).  If you have questions about a medical condition or this instruction, always ask your healthcare professional. [address] any warranty or liability for your use of this information. Patient Education        Nutrition During Pregnancy: Care Instructions  Overview     Healthy eating when you are pregnant is important for you and your baby. It can help you feel well and have a successful pregnancy and delivery. During pregnancy your nutrition needs increase. Even if you have excellent eating habits, your doctor may recommend a multivitamin to make sure you get enoughiron and folic acid. You may wonder how much weight you should gain. In general, if you were at a healthy weight before you became pregnant, then you should gain between 25 and 35 pounds. If you were overweight before pregnancy, then you'll likely be advised to gain 15 to 25 pounds. If you were underweight before pregnancy, then you'll probably be advised to gain 28 to 40 pounds. Your doctor will work with you to set a weight goal that is right for you. Gaining a healthy amount ofweight helps you have a healthy baby. Follow-up care is a key part of your treatment and safety. Be sure to make and go to all appointments, and call your doctor if you are having problems. It's also a good idea to know your test results and keep alist of the medicines you take. How can you care for yourself at home?  Eat plenty of fruits and vegetables. Include a variety of orange, yellow, and leafy dark-green vegetables every day.  Choose whole-grain bread, cereal, and pasta. Good choices include whole wheat bread, whole wheat pasta, brown rice, and oatmeal.   Get 4 or more servings of milk and milk products each day. Good choices include nonfat or low-fat milk, yogurt, and cheese. If you cannot eat milk products, you can get calcium from calcium-fortified products such as orange juice, soy milk, and tofu.  Other non-milk sources of calcium include leafy green vegetables, such as broccoli, kale, mustard greens, turnip greens, bok osman, and brussels sprouts.  If you eat meat, pick lower-fat types. Good choices include lean cuts of meat and chicken or turkey without the skin.  Do not eat shark, swordfish, shabana mackerel, or tilefish. They have high levels of mercury, which is dangerous to your baby. You can eat up to 12 ounces a week of fish or shellfish that have low mercury levels. Good choices include shrimp, wild salmon, pollock, and catfish. Limit some other types of fish, such as white (albacore) tuna, to 4 oz (0.1 kg) a week.  Heat lunch meats (such as turkey, ham, or bologna) to 165°F before you eat them. This reduces your risk of getting sick from a kind of bacteria that can be found in lunch meats.  Do not eat unpasteurized soft cheeses, such as brie, feta, fresh mozzarella, and blue cheese. They have a bacteria that could harm your baby.  Limit caffeine. If you drink coffee or tea, have no more than 1 cup a day. Caffeine is also found in stacy.  Do not drink any alcohol. No amount of alcohol has been found to be safe during pregnancy.  Do not diet or try to lose weight. For example, do not follow a low-carbohydrate diet. If you are overweight at the start of your pregnancy, your doctor will work with you to manage your weight gain.  Tell your doctor about all vitamins and supplements you take. When should you call for help? Watch closely for changes in your health, and be sure to contact your doctor if you have any problems. Where can you learn more? Go to https://ArticleAlleypejose de jesuseweb.healthKibin. org and sign in to your [employer] account. Enter Y785 in the AroundWire box to learn more about \"Nutrition During Pregnancy: Care Instructions. \"     If you do not have an account, please click on the \"Sign Up Now\" link. Current as of: September 8, 2021               Content Version: 13.2  © 4598-7275 Healthwise, Incorporated.    Care instructions adapted under license by [facility]. If you have questions about a medical condition or this instruction, always ask your healthcare professional. [place] [address] any warranty or liability for your use of this information.

## 2022-05-17 NOTE — PROGRESS NOTES
CNM Prenatal Office Note  Subjective:  Devin Royal is here for a return obstetrical visit. Today she is 16w4d weeks EGA. She is doing well, taking her PNV as directed and has no unusual complaints. She does not have vaginal bleeding, n/v, syncope, or headaches. Pt does not feel fetal movement regularly. Objective: Mother's Prenatal Vitals  BP: 100/65  Weight: 137 lb (62.1 kg)  Pulse: 90  Patient Position: Sitting  Prenatal Fetal Information  Fetal Heart Rate: US   Movement: Absent  Pt is A&Ox3, in no acute distress. Normocephalic, atraumatic. PERRL. Resp even and non-labored. Skin pink, warm & dry. Gravid abdomen. VELEZ's well. Gait steady. Assessment:    IUP at 16w4d wks      Diagnosis Orders   1. 16 weeks gestation of pregnancy     2. Encounter for supervision of other normal pregnancy in second trimester       Plan:   Pt counseled on balanced nutrition, adequate fluid intake, taking PNV daily, and exercise along with upcoming anatomy scan   Continue with routine prenatal care.  RTC in 4 wks for prenatal visit      MEDICATIONS:  No orders of the defined types were placed in this encounter. ORDERS:  No orders of the defined types were placed in this encounter. More than 50% of this 20 min visit was education and counseling.

## 2022-05-19 ENCOUNTER — HOSPITAL ENCOUNTER (EMERGENCY)
Age: 37
Discharge: HOME | End: 2022-05-19
Attending: EMERGENCY MEDICINE
Payer: COMMERCIAL

## 2022-05-19 VITALS
RESPIRATION RATE: 15 BRPM | HEART RATE: 67 BPM | SYSTOLIC BLOOD PRESSURE: 96 MMHG | HEIGHT: 64 IN | OXYGEN SATURATION: 98 % | DIASTOLIC BLOOD PRESSURE: 56 MMHG | TEMPERATURE: 97.7 F | BODY MASS INDEX: 23.05 KG/M2 | WEIGHT: 135 LBS

## 2022-05-19 DIAGNOSIS — Z3A.16 16 WEEKS GESTATION OF PREGNANCY: ICD-10-CM

## 2022-05-19 DIAGNOSIS — R55 SYNCOPE, UNSPECIFIED SYNCOPE TYPE: Primary | ICD-10-CM

## 2022-05-19 LAB
ALBUMIN SERPL-MCNC: 3.4 G/DL (ref 3.5–5.2)
ALP BLD-CCNC: 51 U/L (ref 35–104)
ALT SERPL-CCNC: 7 U/L (ref 5–33)
ANION GAP SERPL CALCULATED.3IONS-SCNC: 10 MMOL/L (ref 7–19)
AST SERPL-CCNC: 15 U/L (ref 5–32)
BACTERIA: NEGATIVE /HPF
BASOPHILS ABSOLUTE: 0 K/UL (ref 0–0.2)
BASOPHILS RELATIVE PERCENT: 0.4 % (ref 0–1)
BILIRUB SERPL-MCNC: 0.4 MG/DL (ref 0.2–1.2)
BILIRUBIN URINE: NEGATIVE
BLOOD, URINE: NEGATIVE
BUN BLDV-MCNC: 6 MG/DL (ref 6–20)
CALCIUM SERPL-MCNC: 8.6 MG/DL (ref 8.6–10)
CHLORIDE BLD-SCNC: 108 MMOL/L (ref 98–111)
CLARITY: CLEAR
CO2: 19 MMOL/L (ref 22–29)
COLOR: YELLOW
CREAT SERPL-MCNC: 0.4 MG/DL (ref 0.5–0.9)
CRYSTALS, UA: ABNORMAL /HPF
EOSINOPHILS ABSOLUTE: 0.1 K/UL (ref 0–0.6)
EOSINOPHILS RELATIVE PERCENT: 1.9 % (ref 0–5)
EPITHELIAL CELLS, UA: 6 /HPF (ref 0–5)
GFR AFRICAN AMERICAN: >59
GFR NON-AFRICAN AMERICAN: >60
GLUCOSE BLD-MCNC: 101 MG/DL (ref 74–109)
GLUCOSE URINE: NEGATIVE MG/DL
HCT VFR BLD CALC: 40.6 % (ref 37–47)
HEMOGLOBIN: 13.5 G/DL (ref 12–16)
HYALINE CASTS: 2 /HPF (ref 0–8)
IMMATURE GRANULOCYTES #: 0 K/UL
KETONES, URINE: NEGATIVE MG/DL
LEUKOCYTE ESTERASE, URINE: ABNORMAL
LYMPHOCYTES ABSOLUTE: 1.5 K/UL (ref 1.1–4.5)
LYMPHOCYTES RELATIVE PERCENT: 21.6 % (ref 20–40)
MCH RBC QN AUTO: 28.8 PG (ref 27–31)
MCHC RBC AUTO-ENTMCNC: 33.3 G/DL (ref 33–37)
MCV RBC AUTO: 86.8 FL (ref 81–99)
MONOCYTES ABSOLUTE: 0.4 K/UL (ref 0–0.9)
MONOCYTES RELATIVE PERCENT: 5.9 % (ref 0–10)
NEUTROPHILS ABSOLUTE: 4.8 K/UL (ref 1.5–7.5)
NEUTROPHILS RELATIVE PERCENT: 69.9 % (ref 50–65)
NITRITE, URINE: NEGATIVE
PDW BLD-RTO: 15.1 % (ref 11.5–14.5)
PH UA: 7 (ref 5–8)
PLATELET # BLD: 187 K/UL (ref 130–400)
PMV BLD AUTO: 8.7 FL (ref 9.4–12.3)
POTASSIUM REFLEX MAGNESIUM: 4.1 MMOL/L (ref 3.5–5)
PROTEIN UA: NEGATIVE MG/DL
RBC # BLD: 4.68 M/UL (ref 4.2–5.4)
RBC UA: 2 /HPF (ref 0–4)
SARS-COV-2, NAAT: NOT DETECTED
SODIUM BLD-SCNC: 137 MMOL/L (ref 136–145)
SPECIFIC GRAVITY UA: 1.02 (ref 1–1.03)
TOTAL PROTEIN: 6.4 G/DL (ref 6.6–8.7)
UROBILINOGEN, URINE: 0.2 E.U./DL
WBC # BLD: 6.9 K/UL (ref 4.8–10.8)
WBC UA: 4 /HPF (ref 0–5)

## 2022-05-19 PROCEDURE — 80053 COMPREHEN METABOLIC PANEL: CPT

## 2022-05-19 PROCEDURE — 93005 ELECTROCARDIOGRAM TRACING: CPT | Performed by: EMERGENCY MEDICINE

## 2022-05-19 PROCEDURE — 85025 COMPLETE CBC W/AUTO DIFF WBC: CPT

## 2022-05-19 PROCEDURE — 99284 EMERGENCY DEPT VISIT MOD MDM: CPT

## 2022-05-19 PROCEDURE — 2580000003 HC RX 258: Performed by: EMERGENCY MEDICINE

## 2022-05-19 PROCEDURE — 81001 URINALYSIS AUTO W/SCOPE: CPT

## 2022-05-19 PROCEDURE — 87635 SARS-COV-2 COVID-19 AMP PRB: CPT

## 2022-05-19 PROCEDURE — 36415 COLL VENOUS BLD VENIPUNCTURE: CPT

## 2022-05-19 RX ORDER — SODIUM CHLORIDE, SODIUM LACTATE, POTASSIUM CHLORIDE, AND CALCIUM CHLORIDE .6; .31; .03; .02 G/100ML; G/100ML; G/100ML; G/100ML
1000 INJECTION, SOLUTION INTRAVENOUS ONCE
Status: COMPLETED | OUTPATIENT
Start: 2022-05-19 | End: 2022-05-19

## 2022-05-19 RX ADMIN — SODIUM CHLORIDE, POTASSIUM CHLORIDE, SODIUM LACTATE AND CALCIUM CHLORIDE 1000 ML: 600; 310; 30; 20 INJECTION, SOLUTION INTRAVENOUS at 07:23

## 2022-05-19 ASSESSMENT — ENCOUNTER SYMPTOMS
EYE REDNESS: 0
SHORTNESS OF BREATH: 0
DIARRHEA: 0
VOMITING: 0
ABDOMINAL PAIN: 0
COUGH: 0
RHINORRHEA: 0
EYE PAIN: 0
VOICE CHANGE: 0

## 2022-05-19 ASSESSMENT — PAIN - FUNCTIONAL ASSESSMENT: PAIN_FUNCTIONAL_ASSESSMENT: NONE - DENIES PAIN

## 2022-05-19 NOTE — ED PROVIDER NOTES
[facility] EMERGENCY DEPT  EMERGENCY DEPARTMENT ENCOUNTER      Pt Name: Mahad Brown  MRN: [id number]  [place] 1985  Date of evaluation: 5/19/2022  Provider: Jeni Cam MD    CHIEF COMPLAINT       Chief Complaint   Patient presents with    Loss of Consciousness     Pt arrived to the ed with c/o loss of consciousness in the car. Pt's spouse reports pt was out for 1-2 minutes. Pt did not hit head. Pt is 16 weeks pregnant. HISTORY OF PRESENT ILLNESS   (Location/Symptom, Timing/Onset,Context/Setting, Quality, Duration, Modifying Factors, Severity)  Note limiting factors. Mahad Brown is a 31 y.o. female who presents to the emergency department for evaluation after an episode where she passed out this morning. She was in the car with her  and states they had just dropped off their kids at  when she started feeling very tired and lightheaded/dizzy.  says that he tried to call her name and get her attention but she went unresponsive and her head fell backwards. States that she had some gurgling briefly. Estimates the whole episode lasted about 1 to 2 minutes. Patient was quickly back to normal mental status after the episode resolved. Denies any symptoms such as nausea, vomiting, abdominal pain, vaginal bleeding or discharge, chest pain, shortness of breath. Says she woke up with a mild headache this morning which is not unusual for her. No significant worsening of headache associated with this episode. Patient currently 8 weeks pregnant with thus far uncomplicated pregnancy. Had OB appointment 2 days ago and everything was okay at that time. Says that she has had similar episodes of passing out during her teenage years but they were always associated with going through the day without eating. Says she had not yet had anything to eat this morning but not significantly out of the ordinary as this happened ultimately early in the morning still.     HPI    NursingNotes were reviewed. REVIEW OF SYSTEMS    (2-9 systems for level 4, 10 or more for level 5)     Review of Systems   Constitutional: Negative for fatigue and fever. HENT: Negative for congestion, rhinorrhea and voice change. Eyes: Negative for pain and redness. Respiratory: Negative for cough and shortness of breath. Cardiovascular: Negative for chest pain. Gastrointestinal: Negative for abdominal pain, diarrhea and vomiting. Endocrine: Negative. Genitourinary: Negative. Musculoskeletal: Negative for arthralgias and gait problem. Skin: Negative for rash and wound. Neurological: Positive for syncope. Negative for weakness and headaches. Hematological: Negative. Psychiatric/Behavioral: Negative. All other systems reviewed and are negative. A complete review of systems was performed and is negative except as noted above in the HPI. PAST MEDICAL HISTORY     Past Medical History:   Diagnosis Date    Esophageal reflux 10/10/2017    Gallbladder disease     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     Patient tested positive about two weeks ago from today, 16    Postpartum depression     undiagnosed    Prolactin deficiency [facility])          SURGICAL HISTORY       Past Surgical History:   Procedure Laterality Date     SECTION N/A 2021     SECTION performed by Jeffery Valerio MD at [address] [place] [address] L&D OR    CHOLECYSTECTOMY, LAPAROSCOPIC N/A 10/13/2016    CHOLECYSTECTOMY LAPAROSCOPIC performed by Carri Cesar MD at [facility] Medication List as of 2022  8:48 AM      CONTINUE these medications which have NOT CHANGED    Details   loratadine (CLARITIN) 10 MG capsule Take 10 mg by mouth dailyHistorical Med      CALCIUM PO Take by mouthHistorical Med      Prenatal MV-Min-Fe Fum-FA-DHA (PRENATAL 1 PO) Take by mouthHistorical Med             ALLERGIES     Patient has no known allergies.     FAMILY HISTORY       Family History   Problem Relation Age of Onset    Hypertension Mother     Diabetes Father     Hypertension Father     Alzheimer's Disease Maternal Grandmother     Diabetes Maternal Grandmother     Heart Disease Maternal Grandfather           SOCIAL HISTORY       Social History     Socioeconomic History    Marital status:      Spouse name: None    Number of children: None    Years of education: None    Highest education level: None   Occupational History    None   Tobacco Use    Smoking status: Never Smoker    Smokeless tobacco: Never Used   Vaping Use    Vaping Use: Never used   Substance and Sexual Activity    Alcohol use: No    Drug use: No    Sexual activity: Yes     Partners: Male   Other Topics Concern    None   Social History Narrative    None     Social Determinants of Health     Financial Resource Strain:     Difficulty of Paying Living Expenses: Not on file   Food Insecurity:     Worried About Running Out of Food in the Last Year: Not on file    Brigid of Food in the Last Year: Not on file   Transportation Needs:     Lack of Transportation (Medical): Not on file    Lack of Transportation (Non-Medical):  Not on file   Physical Activity:     Days of Exercise per Week: Not on file    Minutes of Exercise per Session: Not on file   Stress:     Feeling of Stress : Not on file   Social Connections:     Frequency of Communication with Friends and Family: Not on file    Frequency of Social Gatherings with Friends and Family: Not on file    Attends Jewish Services: Not on file    Active Member of Clubs or Organizations: Not on file    Attends Club or Organization Meetings: Not on file    Marital Status: Not on file   Intimate Partner Violence:     Fear of Current or Ex-Partner: Not on file    Emotionally Abused: Not on file    Physically Abused: Not on file    Sexually Abused: Not on file   Housing Stability:     Unable to Pay for Housing in the Last Year: Not on file    Number of Places Lived in the Last Year: Not on file    Unstable Housing in the Last Year: Not on file       SCREENINGS    Jolynn Coma Scale  Eye Opening: Spontaneous  Best Verbal Response: Oriented  Best Motor Response: Obeys commands  Jolynn Coma Scale Score: 15        PHYSICAL EXAM    (up to 7 for level 4, 8 or more for level 5)     ED Triage Vitals   BP Temp Temp Source Pulse Resp SpO2 Height Weight   05/19/22 0652 05/19/22 0652 05/19/22 0652 05/19/22 0652 05/19/22 0652 05/19/22 0652 05/19/22 0649 05/19/22 0649   93/62 97.7 °F (36.5 °C) Oral 89 15 96 % 5' 4\" (1.626 m) 135 lb (61.2 kg)       Physical Exam  Vitals and nursing note reviewed. Constitutional:       General: She is not in acute distress. Appearance: Normal appearance. She is well-developed. She is not toxic-appearing or diaphoretic. HENT:      Head: Normocephalic and atraumatic. Mouth/Throat:      Pharynx: No oropharyngeal exudate. Eyes:      General: No scleral icterus. Right eye: No discharge. Left eye: No discharge. Pupils: Pupils are equal, round, and reactive to light. Neck:      Trachea: No tracheal deviation. Cardiovascular:      Rate and Rhythm: Normal rate and regular rhythm. Pulses: Normal pulses. Heart sounds: Normal heart sounds. Pulmonary:      Effort: Pulmonary effort is normal. No respiratory distress. Breath sounds: Normal breath sounds. No stridor. No wheezing or rhonchi. Abdominal:      General: There is no distension. Palpations: Abdomen is soft. Abdomen is not rigid. Tenderness: There is no abdominal tenderness. There is no guarding. Hernia: No hernia is present. Musculoskeletal:         General: No deformity. Normal range of motion. Cervical back: Normal range of motion. Skin:     General: Skin is warm and dry. Findings: No rash. Neurological:      Mental Status: She is alert and oriented to person, place, and time. GCS: GCS eye subscore is 4. GCS verbal subscore is 5. GCS motor subscore is 6. Cranial Nerves: No cranial nerve deficit. Motor: No abnormal muscle tone. Coordination: Coordination normal.   Psychiatric:         Behavior: Behavior normal.         Thought Content: Thought content normal.         Judgment: Judgment normal.         DIAGNOSTIC RESULTS     EKG: All EKG's are interpreted by the Emergency Department Physician who either signs or Co-signs this chart in the absence of a cardiologist.        RADIOLOGY:   Non-plain film images such as CT, Ultrasound and MRI are read by the radiologist. [facility] are visualized and preliminarily interpreted by the emergency physician with the below findings:        Interpretation per the Radiologist below, if available at the time of this note:    No orders to display         ED BEDSIDE ULTRASOUND:   Performed by ED Physician - none    LABS:  Labs Reviewed   CBC WITH AUTO DIFFERENTIAL - Abnormal; Notable for the following components:       Result Value    RDW 15.1 (*)     MPV 8.7 (*)     Neutrophils % 69.9 (*)     All other components within normal limits   COMPREHENSIVE METABOLIC PANEL W/ REFLEX TO MG FOR LOW K - Abnormal; Notable for the following components:    CO2 19 (*)     CREATININE 0.4 (*)     Total Protein 6.4 (*)     Albumin 3.4 (*)     All other components within normal limits   URINALYSIS WITH REFLEX TO CULTURE - Abnormal; Notable for the following components:    Leukocyte Esterase, Urine TRACE (*)     All other components within normal limits   MICROSCOPIC URINALYSIS - Abnormal; Notable for the following components:    Bacteria, UA NEGATIVE (*)     Crystals, UA NEG (*)     All other components within normal limits   COVID-19, RAPID       All other labs were within normal range or not returned as of this dictation.     Medications   lactated ringers bolus (0 mLs IntraVENous Stopped 5/19/22 2861)       EMERGENCY DEPARTMENT COURSE and DIFFERENTIALDIAGNOSIS/MDM:   Vitals:    Vitals:    05/19/22 0745 05/19/22 0821 05/19/22 0835 05/19/22 0904   BP: 92/69 96/60 98/65 (!) 96/56   Pulse: 76 65 71 67   Resp: 15  15    Temp:    97.7 °F (36.5 °C)   TempSrc:    Oral   SpO2: 99% 100% 99% 98%   Weight:       Height:       PF:    (!) 16 L/min       MDM    ED Course as of 05/19/22 0909   Thu May 19, 2022   0712 EKG shows sinus rhythm with a rate of 81. Low voltage anterior and laterally. Nonspecific flat T waves diffusely. Normal QRS and QTc intervals. [ELISEO]   [id number] Bedside OB ultrasound performed and shows good fetal movement with fetal heart rate of 153. No signs of subchorionic hemorrhage or other apparent complications. Gross visualization shows normal-appearing amount of amniotic fluid. [ELISEO]   [id number] no signs or symptoms of PE. No signs of anemia or significant dehydration or electrolyte derangements on evaluation. Patient does have headache but no meningismus. No concern for subarachnoid hemorrhage or other significant acute cause of headache to be related to syncopal episode. Blood pressure lower end of normal here which patient states is not too far off of her recent baseline. Glucose at the time labs were sent is 101. Pending urinalysis but otherwise no signs of infection evaluation thus far. [ELISEO]      ED Course User Index  [ELISEO] Lexi Reed MD     Blood pressure remained stable but lower end of normal after IV fluids. After period of monitoring and reassuring work-up, patient felt stable for discharge with outpatient follow-up with OB as needed. Evaluation and work-up here revealed no signs of emergent or life-threatening pathology that would necessitate admission for further work-up or management at this time. Patient is felt to be stable for discharge home with return precautions for worsening of the condition or development of new concerning symptoms.   Patient was encouraged to follow-up with their primary care doctor in the appropriate timeframe. Necessary prescriptions and information have been provided for treatment at home. Patient voices understanding and agreement with the plan. CONSULTS:  None    PROCEDURES:  Unless otherwise notedbelow, none     Procedures      FINAL IMPRESSION     1.  Syncope, unspecified syncope type    2. 16 weeks gestation of pregnancy          DISPOSITION/PLAN   DISPOSITION Decision To Discharge 05/19/2022 08:45:11 AM      PATIENT REFERRED TO:  [address] [place] [address] EMERGENCY DEPT  [facility]  [phone]    If symptoms worsen    DIANA Walker [facility] [id number]      As needed      DISCHARGE MEDICATIONS:  Discharge Medication List as of 5/19/2022  8:48 AM             (Please note that portions of this note were completed with a voice recognition program.  Efforts were made to edit the dictations butoccasionally words are mis-transcribed.)    Lotus Silverio MD (electronically signed)  AttendingEmergency Physician          Lotus Silverio., MD  05/19/22 6731

## 2022-05-20 LAB
EKG P AXIS: -5 DEGREES
EKG P-R INTERVAL: 126 MS
EKG Q-T INTERVAL: 382 MS
EKG QRS DURATION: 72 MS
EKG QTC CALCULATION (BAZETT): 417 MS
EKG T AXIS: 13 DEGREES

## 2022-05-20 RX ORDER — PROGESTERONE 200 MG/1
200 CAPSULE ORAL NIGHTLY
Qty: 90 CAPSULE | Refills: 1 | OUTPATIENT
Start: 2022-05-20

## 2022-06-13 ENCOUNTER — OFFICE VISIT (OUTPATIENT)
Dept: PRIMARY CARE CLINIC | Age: 37
End: 2022-06-13
Payer: COMMERCIAL

## 2022-06-13 VITALS
HEIGHT: 64 IN | TEMPERATURE: 97.1 F | BODY MASS INDEX: 22.88 KG/M2 | DIASTOLIC BLOOD PRESSURE: 70 MMHG | OXYGEN SATURATION: 98 % | SYSTOLIC BLOOD PRESSURE: 110 MMHG | WEIGHT: 134 LBS | HEART RATE: 76 BPM

## 2022-06-13 DIAGNOSIS — R55 SYNCOPE, UNSPECIFIED SYNCOPE TYPE: Primary | ICD-10-CM

## 2022-06-13 DIAGNOSIS — R55 SYNCOPE, UNSPECIFIED SYNCOPE TYPE: ICD-10-CM

## 2022-06-13 DIAGNOSIS — Z36.9 ANTENATAL SCREENING ENCOUNTER: ICD-10-CM

## 2022-06-13 LAB
ALBUMIN SERPL-MCNC: 3.6 G/DL (ref 3.5–5.2)
ALP BLD-CCNC: 56 U/L (ref 35–104)
ALT SERPL-CCNC: 7 U/L (ref 5–33)
ANION GAP SERPL CALCULATED.3IONS-SCNC: 12 MMOL/L (ref 7–19)
AST SERPL-CCNC: 13 U/L (ref 5–32)
BASOPHILS ABSOLUTE: 0 K/UL (ref 0–0.2)
BASOPHILS RELATIVE PERCENT: 0.4 % (ref 0–1)
BILIRUB SERPL-MCNC: 0.4 MG/DL (ref 0.2–1.2)
BUN BLDV-MCNC: 7 MG/DL (ref 6–20)
CALCIUM SERPL-MCNC: 9.1 MG/DL (ref 8.6–10)
CHLORIDE BLD-SCNC: 103 MMOL/L (ref 98–111)
CO2: 23 MMOL/L (ref 22–29)
CREAT SERPL-MCNC: 0.5 MG/DL (ref 0.5–0.9)
EOSINOPHILS ABSOLUTE: 0.2 K/UL (ref 0–0.6)
EOSINOPHILS RELATIVE PERCENT: 2 % (ref 0–5)
FERRITIN: 26.2 NG/ML (ref 13–150)
FOLATE: >20 NG/ML (ref 4.8–37.3)
GFR AFRICAN AMERICAN: >59
GFR NON-AFRICAN AMERICAN: >60
GLUCOSE BLD-MCNC: 83 MG/DL (ref 74–109)
HCT VFR BLD CALC: 43.2 % (ref 37–47)
HEMOGLOBIN: 14.3 G/DL (ref 12–16)
HEPATITIS C ANTIBODY INTERPRETATION: NORMAL
IMMATURE GRANULOCYTES #: 0.1 K/UL
IRON SATURATION: 24 % (ref 14–50)
IRON: 89 UG/DL (ref 37–145)
LYMPHOCYTES ABSOLUTE: 2 K/UL (ref 1.1–4.5)
LYMPHOCYTES RELATIVE PERCENT: 21.9 % (ref 20–40)
MCH RBC QN AUTO: 30 PG (ref 27–31)
MCHC RBC AUTO-ENTMCNC: 33.1 G/DL (ref 33–37)
MCV RBC AUTO: 90.8 FL (ref 81–99)
MONOCYTES ABSOLUTE: 0.5 K/UL (ref 0–0.9)
MONOCYTES RELATIVE PERCENT: 5.3 % (ref 0–10)
NEUTROPHILS ABSOLUTE: 6.3 K/UL (ref 1.5–7.5)
NEUTROPHILS RELATIVE PERCENT: 69.7 % (ref 50–65)
PDW BLD-RTO: 15.1 % (ref 11.5–14.5)
PLATELET # BLD: 203 K/UL (ref 130–400)
PMV BLD AUTO: 9.3 FL (ref 9.4–12.3)
POTASSIUM SERPL-SCNC: 3.8 MMOL/L (ref 3.5–5)
RBC # BLD: 4.76 M/UL (ref 4.2–5.4)
SODIUM BLD-SCNC: 138 MMOL/L (ref 136–145)
TOTAL IRON BINDING CAPACITY: 375 UG/DL (ref 250–400)
TOTAL PROTEIN: 6.8 G/DL (ref 6.6–8.7)
WBC # BLD: 9.1 K/UL (ref 4.8–10.8)

## 2022-06-13 PROCEDURE — 1036F TOBACCO NON-USER: CPT | Performed by: NURSE PRACTITIONER

## 2022-06-13 PROCEDURE — G8420 CALC BMI NORM PARAMETERS: HCPCS | Performed by: NURSE PRACTITIONER

## 2022-06-13 PROCEDURE — 93000 ELECTROCARDIOGRAM COMPLETE: CPT | Performed by: NURSE PRACTITIONER

## 2022-06-13 PROCEDURE — 99214 OFFICE O/P EST MOD 30 MIN: CPT | Performed by: NURSE PRACTITIONER

## 2022-06-13 PROCEDURE — G8427 DOCREV CUR MEDS BY ELIG CLIN: HCPCS | Performed by: NURSE PRACTITIONER

## 2022-06-13 SDOH — ECONOMIC STABILITY: FOOD INSECURITY: WITHIN THE PAST 12 MONTHS, YOU WORRIED THAT YOUR FOOD WOULD RUN OUT BEFORE YOU GOT MONEY TO BUY MORE.: NEVER TRUE

## 2022-06-13 SDOH — ECONOMIC STABILITY: FOOD INSECURITY: WITHIN THE PAST 12 MONTHS, THE FOOD YOU BOUGHT JUST DIDN'T LAST AND YOU DIDN'T HAVE MONEY TO GET MORE.: NEVER TRUE

## 2022-06-13 ASSESSMENT — ENCOUNTER SYMPTOMS
VOICE CHANGE: 0
COLOR CHANGE: 0
COUGH: 0
VOMITING: 0
PHOTOPHOBIA: 0
BACK PAIN: 0
RHINORRHEA: 0
NAUSEA: 0
SHORTNESS OF BREATH: 0

## 2022-06-13 ASSESSMENT — SOCIAL DETERMINANTS OF HEALTH (SDOH): HOW HARD IS IT FOR YOU TO PAY FOR THE VERY BASICS LIKE FOOD, HOUSING, MEDICAL CARE, AND HEATING?: NOT HARD AT ALL
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
## 2022-06-14 ENCOUNTER — TELEPHONE (OUTPATIENT)
Dept: PRIMARY CARE CLINIC | Age: 37
End: 2022-06-14

## 2022-06-15 ENCOUNTER — ROUTINE PRENATAL (OUTPATIENT)
Dept: OBGYN CLINIC | Age: 37
End: 2022-06-15

## 2022-06-15 VITALS
BODY MASS INDEX: 23.69 KG/M2 | HEART RATE: 70 BPM | WEIGHT: 138 LBS | DIASTOLIC BLOOD PRESSURE: 64 MMHG | SYSTOLIC BLOOD PRESSURE: 98 MMHG

## 2022-06-15 DIAGNOSIS — Z98.891 HISTORY OF CESAREAN SECTION, LOW TRANSVERSE: ICD-10-CM

## 2022-06-15 DIAGNOSIS — Z34.82 ENCOUNTER FOR SUPERVISION OF OTHER NORMAL PREGNANCY IN SECOND TRIMESTER: Primary | ICD-10-CM

## 2022-06-15 DIAGNOSIS — Z3A.20 20 WEEKS GESTATION OF PREGNANCY: ICD-10-CM

## 2022-06-15 PROCEDURE — 1036F TOBACCO NON-USER: CPT | Performed by: NURSE PRACTITIONER

## 2022-06-15 PROCEDURE — G8420 CALC BMI NORM PARAMETERS: HCPCS | Performed by: NURSE PRACTITIONER

## 2022-06-15 PROCEDURE — G8427 DOCREV CUR MEDS BY ELIG CLIN: HCPCS | Performed by: NURSE PRACTITIONER

## 2022-06-15 PROCEDURE — 0502F SUBSEQUENT PRENATAL CARE: CPT | Performed by: NURSE PRACTITIONER

## 2022-06-15 NOTE — PROGRESS NOTES
CNM Prenatal Office Note  Subjective:  Aleyda Can is here for a return obstetrical visit. Today she is 20w5d weeks EGA. She is doing well, taking her PNV as directed, and has no complaints. She  does not have vaginal bleeding, n/v, syncope, or headaches. Pt does feel fetal movement regularly. Objective: Mother's Prenatal Vitals  BP: 98/64  Weight: 138 lb (62.6 kg)  Heart Rate: 70  Patient Position: Sitting  Prenatal Fetal Information  Fetal Heart Rate: 148  Movement: Present  Pt is A&Ox3, in no acute distress. Normocephalic, atraumatic. PERRL. Resp even and non-labored. Skin pink, warm & dry. Gravid abdomen. VELEZ's well. Gait steady. Assessment:    IUP at 20w5d wks      Diagnosis Orders   1. Encounter for supervision of other normal pregnancy in second trimester     2. History of  section, low transverse     3. 20 weeks gestation of pregnancy       Plan:   Pt counseled on balanced nutrition, adequate fluid intake, taking PNV daily, and exercise along with review of anatomy with need for follow up for suboptimal views   Continue with routine prenatal care.  RTC in 4 wks for prenatal visit      MEDICATIONS:  No orders of the defined types were placed in this encounter. ORDERS:  No orders of the defined types were placed in this encounter. More than 50% of this 20 min visit was education and counseling.

## 2022-06-15 NOTE — PATIENT INSTRUCTIONS
Patient Education        Weeks 18 to 22 of Your Pregnancy: Care Instructions  Overview     Your baby is continuing to develop quickly. Sometime between 18 and 22 weeks, you'll probably start to feel your baby move. At first, these small fetal movements feel like fluttering or \"butterflies. \" Or they may feel like gas bubbles. As your baby grows, these movements will become stronger. You may also notice that your baby hiccups. Babies at this stage cannow suck their thumbs. You may find that your nausea and fatigue are gone. You may feel better overall and have more energy than you did in your first trimester. But you might now also have some new discomforts, like sleep problems or leg cramps. Talk to yourdoctor about things you can do at home to ease these problems. Follow-up care is a key part of your treatment and safety. Be sure to make and go to all appointments, and call your doctor if you are having problems. It's also a good idea to know your test results and keep alist of the medicines you take. How can you care for yourself at home? Ease sleep problems   Avoid caffeine in drinks or chocolate late in the day.  Get some exercise every day.  Take a warm shower or bath before bed.  Have a light snack or glass of milk at bedtime.  Do relaxation exercises in bed to calm your mind and body.  Support your legs and back with extra pillows. Try a pillow between your legs if you sleep on your side.  Do not use sleeping pills or alcohol. They could harm your baby. Ease leg cramps   Do not massage your calf during the cramp.  Sit on a firm bed or chair. Straighten your leg, and bend your foot (flex your ankle) slowly upward, toward your knee. Bend your toes up and down.  Stand on a cool, flat surface. Stretch your toes upward, and take small steps walking on your heels.  Use a heating pad or hot water bottle to help with muscle ache. Prevent leg cramps   Be sure to get enough calcium.  If you are worried that you are not getting enough, talk to your doctor.  Exercise every day, and stretch your legs before bed.  Take a warm bath before bed, and try leg warmers at night. Where can you learn more? Go to https://chmelissa.healthGamerius. org and sign in to your [employer] account. Enter N840 in the [employer] box to learn more about \"Weeks 18 to 22 of Your Pregnancy: Care Instructions. \"     If you do not have an account, please click on the \"Sign Up Now\" link. Current as of: June 16, 2021               Content Version: 13.2  © 1592-6577 Healthwise, [employer]. Care instructions adapted under license by [facility] ([facility]). If you have questions about a medical condition or this instruction, always ask your healthcare professional. [address] any warranty or liability for your use of this information.

## 2022-07-14 ENCOUNTER — ROUTINE PRENATAL (OUTPATIENT)
Dept: OBGYN CLINIC | Age: 37
End: 2022-07-14

## 2022-07-14 VITALS
DIASTOLIC BLOOD PRESSURE: 60 MMHG | HEART RATE: 78 BPM | WEIGHT: 149 LBS | SYSTOLIC BLOOD PRESSURE: 110 MMHG | BODY MASS INDEX: 25.58 KG/M2

## 2022-07-14 DIAGNOSIS — Z34.82 ENCOUNTER FOR SUPERVISION OF OTHER NORMAL PREGNANCY IN SECOND TRIMESTER: ICD-10-CM

## 2022-07-14 DIAGNOSIS — Z98.891 HISTORY OF CESAREAN SECTION, LOW TRANSVERSE: ICD-10-CM

## 2022-07-14 DIAGNOSIS — Z36.9 ANTENATAL SCREENING ENCOUNTER: ICD-10-CM

## 2022-07-14 DIAGNOSIS — Z3A.24 24 WEEKS GESTATION OF PREGNANCY: Primary | ICD-10-CM

## 2022-07-14 PROCEDURE — 0502F SUBSEQUENT PRENATAL CARE: CPT | Performed by: ADVANCED PRACTICE MIDWIFE

## 2022-07-14 RX ORDER — FLUTICASONE PROPIONATE 50 MCG
1 SPRAY, SUSPENSION (ML) NASAL DAILY
Status: ON HOLD | COMMUNITY
End: 2022-10-27 | Stop reason: HOSPADM

## 2022-07-14 NOTE — PROGRESS NOTES
Patient presents today for routine prenatal care. Pt denies any vaginal leaking bleeding or contractions. + Fetal movement.

## 2022-07-14 NOTE — PROGRESS NOTES
CNM Prenatal Office Note  Subjective:  Lukas Fung is here for a return obstetrical visit. Today she is 24w6d weeks EGA. She is doing well, taking her PNV as directed and has no unusual complaints. She does not have vaginal bleeding, n/v, syncope, or headaches. Pt does feel fetal movement regularly. Objective: Mother's Prenatal Vitals  BP: 110/60  Weight: 149 lb (67.6 kg)  Heart Rate: 78  Patient Position: Sitting  Prenatal Fetal Information  Fundal Height (cm): 25 cm  Fetal Heart Rate: 142  Movement: Present  Pt is A&Ox3, in no acute distress. Normocephalic, atraumatic. PERRL. Resp even and non-labored. Skin pink, warm & dry. Gravid abdomen. VELEZ's well. Gait steady. Assessment:    IUP at 24w6d wks      Diagnosis Orders   1. 24 weeks gestation of pregnancy     2. Encounter for supervision of other normal pregnancy in second trimester     3. History of  section, low transverse     4.  screening encounter       Plan:   Pt counseled on balanced nutrition, adequate fluid intake, taking PNV daily, and exercise along with GHTN precautions, Kick count and  labor   Continue with routine prenatal care.  RTC in 3 wks for prenatal visit   Plan GCT next visit      MEDICATIONS:  No orders of the defined types were placed in this encounter. ORDERS:  No orders of the defined types were placed in this encounter. More than 50% of this 20 min visit was education and counseling.

## 2022-07-14 NOTE — PATIENT INSTRUCTIONS
Patient Education        Nutrition During Pregnancy: Care Instructions  Overview     Healthy eating when you are pregnant is important for you and your baby. It can help you feel well and have a successful pregnancy and delivery. During pregnancy your nutrition needs increase. Even if you have excellent eating habits, your doctor may recommend a multivitamin to make sure you get enoughiron and folic acid. You may wonder how much weight you should gain. In general, if you were at a healthy weight before you became pregnant, then you should gain between 25 and 35 pounds. If you were overweight before pregnancy, then you'll likely be advised to gain 15 to 25 pounds. If you were underweight before pregnancy, then you'll probably be advised to gain 28 to 40 pounds. Your doctor will work with you to set a weight goal that is right for you. Gaining a healthy amount ofweight helps you have a healthy baby. Follow-up care is a key part of your treatment and safety. Be sure to make and go to all appointments, and call your doctor if you are having problems. It's also a good idea to know your test results and keep alist of the medicines you take. How can you care for yourself at home?  Eat plenty of fruits and vegetables. Include a variety of orange, yellow, and leafy dark-green vegetables every day.  Choose whole-grain bread, cereal, and pasta. Good choices include whole wheat bread, whole wheat pasta, brown rice, and oatmeal.   Get 4 or more servings of milk and milk products each day. Good choices include nonfat or low-fat milk, yogurt, and cheese. If you cannot eat milk products, you can get calcium from calcium-fortified products such as orange juice, soy milk, and tofu. Other non-milk sources of calcium include leafy green vegetables, such as broccoli, kale, mustard greens, turnip greens, bok osman, and brussels sprouts.  If you eat meat, pick lower-fat types.  Good choices include lean cuts of meat and chicken or turkey without the skin.  Do not eat shark, swordfish, shabana mackerel, or tilefish. They have high levels of mercury, which is dangerous to your baby. You can eat up to 12 ounces a week of fish or shellfish that have low mercury levels. Good choices include shrimp, wild salmon, pollock, and catfish. Limit some other types of fish, such as white (albacore) tuna, to 4 oz (0.1 kg) a week.  Heat lunch meats (such as turkey, ham, or bologna) to 165°F before you eat them. This reduces your risk of getting sick from a kind of bacteria that can be found in lunch meats.  Do not eat unpasteurized soft cheeses, such as brie, feta, fresh mozzarella, and blue cheese. They have a bacteria that could harm your baby.  Limit caffeine. If you drink coffee or tea, have no more than 1 cup a day. Caffeine is also found in stacy.  Do not drink any alcohol. No amount of alcohol has been found to be safe during pregnancy.  Do not diet or try to lose weight. For example, do not follow a low-carbohydrate diet. If you are overweight at the start of your pregnancy, your doctor will work with you to manage your weight gain.  Tell your doctor about all vitamins and supplements you take. When should you call for help? Watch closely for changes in your health, and be sure to contact your doctor if you have any problems. Where can you learn more? Go to https://chmichaelewtony.healthWibbitz. org and sign in to your [employer] account. Enter Y785 in the [facility] box to learn more about \"Nutrition During Pregnancy: Care Instructions. \"     If you do not have an account, please click on the \"Sign Up Now\" link. Current as of: September 8, 2021               Content Version: 13.3  © 6922-3601 Healthwise, [employer]. Care instructions adapted under license by [facility] ([facility]).  If you have questions about a medical condition or this instruction, always ask your healthcare professional. [address] any warranty or liability for your use of this information. Patient Education        Pregnancy Precautions: Care Instructions  Your Care Instructions     There is no sure way to prevent labor before your due date ( labor) or to prevent most other pregnancy problems. But there are things you can do to increase your chances of a healthy pregnancy. Go to your appointments, follow your doctor's advice, and take good care of yourself. Eat well, and exercise(if your doctor agrees). And make sure to drink plenty of water. Follow-up care is a key part of your treatment and safety. Be sure to make and go to all appointments, and call your doctor if you are having problems. It's also a good idea to know your test results and keep alist of the medicines you take. How can you care for yourself at home?  Make sure you go to your prenatal appointments. At each visit, your doctor will check your blood pressure. Your doctor will also check to see if you have protein in your urine. High blood pressure and protein in urine are signs of preeclampsia. This condition can be dangerous for you and your baby.  Drink plenty of fluids. Dehydration can cause contractions. If you have kidney, heart, or liver disease and have to limit fluids, talk with your doctor before you increase the amount of fluids you drink.  Tell your doctor right away if you notice any symptoms of an infection, such as:  ? Burning when you urinate. ? A foul-smelling discharge from your vagina. ? Vaginal itching. ? Unexplained fever. ? Unusual pain or soreness in your uterus or lower belly.  Eat a balanced diet. Include plenty of foods that are high in calcium and iron. ? Foods high in calcium include milk, cheese, yogurt, almonds, and broccoli. ? Foods high in iron include red meat, shellfish, poultry, eggs, beans, raisins, whole-grain bread, and leafy green vegetables.  Do not smoke.  If you need help quitting, talk to your doctor about stop-smoking programs and medicines. These can increase your chances of quitting for good.  Do not drink alcohol or use marijuana or illegal drugs.  Follow your doctor's directions about activity. Your doctor will let you know how much, if any, exercise you can do.  Ask your doctor if you can have sex. If you are at risk for early labor, your doctor may ask you to not have sex.  Take care to prevent falls. During pregnancy, your joints are loose, and your balance is off. Sports such as bicycling, skiing, or in-line skating can increase your risk of falling. And don't ride horses or motorcycles, dive, water ski, scuba dive, or parachute jump while you are pregnant.  Avoid things that can make your body too hot and may be harmful to your baby, such as a hot tub or sauna. Or talk with your doctor before doing anything that raises your body temperature. Your doctor can tell you if it's safe.  Do not take any over-the-counter or herbal medicines or supplements without talking to your doctor or pharmacist first.  When should you call for help? Call 911  anytime you think you may need emergency care. For example, call if:     You passed out (lost consciousness).      You have a seizure.      You have severe vaginal bleeding.      You have severe pain in your belly or pelvis.      You have had fluid gushing or leaking from your vagina and you know or think the umbilical cord is bulging into your vagina. If this happens, immediately get down on your knees so your rear end (buttocks) is higher than your head. This will decrease the pressure on the cord until help arrives. Call your doctor now or seek immediate medical care if:     You have signs of preeclampsia, such as:  ? Sudden swelling of your face, hands, or feet. ? New vision problems (such as dimness, blurring, or seeing spots).   ? A severe headache.      You have any vaginal bleeding.      You have belly pain or cramping.      You have a fever.      You have had regular contractions (with or without pain) for an hour. This means that you have 8 or more within 1 hour or 4 or more in 20 minutes after you change your position and drink fluids.      You have a sudden release of fluid from your vagina.      You have low back pain or pelvic pressure that does not go away.      You notice that your baby has stopped moving or is moving much less than normal.   Watch closely for changes in your health, and be sure to contact your doctor ifyou have any problems. Where can you learn more? Go to [email or web address]://[email or web address].[email or web address] [email or web address] org and sign in to your [employer] account. Enter [id number] in the [facility] box to learn more about \"Pregnancy Precautions: Care Instructions. \"     If you do not have an account, please click on the \"Sign Up Now\" link. Current as of: February 23, 2022               Content Version: 13.3  © 2006-2022 [employer]. Care instructions adapted under license by [facility] ([facility]). If you have questions about a medical condition or this instruction, always ask your healthcare professional. [place] [address] any warranty or liability for your use of this information. Patient Education        During Pregnancy: Exercises  Introduction  Here are some examples of exercises to do during your pregnancy. Start eachexercise slowly. Ease off the exercise if you start to have pain. Your doctor or physical therapist will tell you when you can start theseexercises and which ones will work best for you. How to do the exercises  Neck rotation    1. Sit in a firm chair, or stand up straight. 2. Keeping your chin level, turn your head to the right, and hold for 15 to 30 seconds. 3. Turn your head to the left and hold for 15 to 30 seconds. 4. Repeat 2 to 4 times to each side. Forward neck flexion    1. Sit in a firm chair, or stand up straight. 2. Bend your head forward.   3. Hold for 15 to 30 seconds. 4. Repeat 2 to 4 times. Back press    1. Place your feet 10 to 12 inches from the wall. 2. Rest your back flat against the wall and slide down the wall until your knees are slightly bent. 3. Press your lower back against the wall by pulling in your stomach muscles. 4. Hold for 6 seconds, and then relax your stomach muscles and slide back up the wall. 5. Repeat 8 to 12 times. Full body twist    1. Sit with your legs crossed. 2. Reach your left hand toward your right foot, and place your right hand at your side for support. 3. Slowly twist your torso to your right. 4. Switch your hands and twist to your left. 5. Repeat 2 to 4 times. Pelvic rocking    1. Kneeling on hands and knees, place your hands directly under your shoulders and your knees under your hips. 2. Breathe in deeply. Tuck your head downward and round your back up, making a curve with your back in the shape of the letter C. Hold this position for a count of 6.  3. Breathe out slowly and bring your head back up. Relax, keeping your back straight (don't allow it to curve toward the floor). Hold this for a count of 6.  4. Do this exercise 8 times or to your comfort level. Pelvic tilt    This exercise strengthens your lower back and pelvis. It is for use during the first 4 months of pregnancy. After this point, lying on your back is notrecommended, because it can cause blood flow problems for you and your baby. 1. Lie on your back. 2. Keep your knees relaxed. 3. Tighten your belly and buttocks muscles. 4. At the same time, gently shift your pelvis upward. This should flatten the curve in your back. 5. Hold for 6 seconds and then relax. 6. Gradually increase the number of tilts you do each day, to your comfort level. Backward stretch    1. Kneel on hands and knees with your knees 8 to 10 inches apart, hands directly under your shoulders, and arms and back straight.   2. Keeping your arms straight, slowly lower your buttocks toward your heels and tuck your head toward your knees. Hold for 15 to 30 seconds. 3. Slowly return to the kneeling position. 4. Repeat 2 to 4 times. Forward bend    1. Sit comfortably in a chair, with your arms relaxed. 2. Slowly bend forward, allowing your arms to hang down in front of you. Lean only as far as you can without feeling discomfort or pressure on your belly. 3. Hold for 15 to 30 seconds and then slowly sit up straight. 4. Repeat 2 to 4 times or to your comfort level. Leg lift crawl    1. Kneeling on hands and knees, place your hands directly under your shoulders and straighten your arms. 2. Tighten your belly muscles by pulling in your belly button toward your spine. Be sure you continue to breathe normally and do not hold your breath. 3. Lift your left knee and bring it toward your elbow. 4. Slowly extend your leg behind you without completely straightening it. Be careful not to let your hip drop down. Avoid arching your back. 5. Hold your leg behind you for about 6 seconds. 6. Return to your starting position. 7. Do the same exercise with your other leg. 8. Repeat 8 to 12 times for each leg. Tailor sitting    1. Sit on the floor. 2. Bring your feet close to your body while crossing your ankles. 3. Hold this position for as long as you are comfortable. Tailor stretching    1. Sit on the floor with your back straight, legs about 12 inches apart, and feet relaxed outward. 2. Stretch your hands forward toward your left foot, then sit up. 3. Stretch your hands straight forward, then sit up. 4. Stretch your hands forward toward your right foot, then sit up. 5. Hold each stretch for 15 to 30 seconds. 6. Repeat 2 to 4 times. Follow-up care is a key part of your treatment and safety. Be sure to make and go to all appointments, and call your doctor if you are having problems. It's also a good idea to know your test results and keep alist of the medicines you take.   Where can you learn more?  Go to [email or web address] and sign in to your "[employer] account. Enter B636 in the [employer] box to learn more about \"During Pregnancy: Exercises. \"     If you do not have an account, please click on the \"Sign Up Now\" link. Current as of: February 23, 2022               Content Version: 13.3  © 2006-2022 [employer]. Care instructions adapted under license by [facility] ([facility]). If you have questions about a medical condition or this instruction, always ask your healthcare professional. [place] [address] any warranty or liability for your use of this information. Patient Education        Weeks 22 to 26 of Your Pregnancy: Care Instructions  Overview     As you enter your 7th month of pregnancy at week 26, your baby's lungs are growing stronger and getting ready to breathe. You may notice that your baby responds to the sound of your voice. You may also notice that your baby does less turning and twisting and more squirming, kicking, or jerking. Jerking often means that your baby has hiccups. Hiccups are normal and are onlytemporary. You may want to think about attending a childbirth preparation class. This is also a good time to start thinking about whether you want to have pain medicineduring labor. You may be tested for gestational diabetes between weeks 25 and 28. Gestational diabetes occurs when your blood sugar level gets too high when you're pregnant. The test is important, because you can have gestational diabetes and not knowit. But the condition can cause problems for your baby. Follow-up care is a key part of your treatment and safety. Be sure to make and go to all appointments, and call your doctor if you are having problems. It's also a good idea to know your test results and keep alist of the medicines you take. How can you care for yourself at home? Ease discomfort from your baby's kicking   Change your position.  Sometimes this will cause your baby to change position too.  Take a deep breath while you raise your arm over your head. Then breathe out while you drop your arm. Do Kegel exercises to prevent urine from leaking   You can do Kegel exercises while you stand or sit. ? Squeeze the same muscles you would use to stop your urine. Your belly and thighs should not move. ? Hold the squeeze for 3 seconds, and then relax for 3 seconds. ? Start with 3 seconds. Then add 1 second each week until you are able to squeeze for 10 seconds. ? Repeat the exercise 10 to 15 times for each session. Do three or more sessions each day. Ease or reduce swelling in your feet, ankles, hands, and fingers   If your fingers are puffy, take off your rings.  Do not eat high-salt foods, such as potato chips.  Prop up your feet on a stool or couch as much as possible. Sleep with pillows under your feet.  Do not stand for long periods of time or wear tight shoes.  Wear support stockings. Where can you learn more? Go to https://[employer].NovoED. org and sign in to your [employer] account. Enter G264 in the [facility] box to learn more about \"Weeks 22 to 26 of Your Pregnancy: Care Instructions. \"     If you do not have an account, please click on the \"Sign Up Now\" link. Current as of: February 23, 2022               Content Version: 13.3  © 2006-2022 [employer]. Care instructions adapted under license by [facility] ([facility]). If you have questions about a medical condition or this instruction, always ask your healthcare professional. [place] [address] any warranty or liability for your use of this information. Patient Education        Meri Santos Contractions: Care Instructions  Your Care Instructions     Zach White contractions prepare your uterus for labor. Think of them as a \"warm-up\" exercise that your body does.  You may begin to feel them between [id number] and 30th weeks of your pregnancy. But they start as early as the 20th week. Zach White contractions usually occur more often during the ninth month. They may go away when you are active and return when you rest. These contractions are like mild contractions of true labor, but they occur lessoften. (You feel fewer than 8 in an hour.) They don't cause your cervix to open. It may be hard for you to tell the difference between Zcah Hickscontractions and true labor, especially in your first pregnancy. Follow-up care is a key part of your treatment and safety. Be sure to make and go to all appointments, and call your doctor if you are having problems. It's also a good idea to know your test results and keep alist of the medicines you take. How can you care for yourself at home?  Try a warm bath to help relieve muscle tension and reduce pain.  Change positions every 30 minutes. Take breaks if you must sit for a long time. Get up and walk around.  Drink plenty of water.  Taking short walks may help you feel better. Your doctor needs to check any contractions that are getting stronger or closertogether. Where can you learn more? Go to https://sevenload.Xoinka. org and sign in to your [employer]flip account. Enter R037 in the [facility] box to learn more about \"Redford White Contractions: Care Instructions. \"     If you do not have an account, please click on the \"Sign Up Now\" link. Current as of: February 23, 2022               Content Version: 13.3  © 2006-2022 [employer]. Care instructions adapted under license by [facility] ([facility]). If you have questions about a medical condition or this instruction, always ask your healthcare professional. [place] [address] any warranty or liability for your use of this information. Patient Education        Learning About Screening for Gestational Diabetes  What is gestational diabetes screening?      Screening for gestational diabetes is a way to look for high blood sugar during pregnancy. You drink some very sweet liquid. Then you have a blood test to seehow your body uses sugar (glucose). How is gestational diabetes screening done? Screening for gestational diabetes may be done in a couple of ways. Two-part screening.  Part one (glucose challenge test): A blood sample is taken after you drink a liquid that contains sugar (glucose). You don't need to stop eating or drinking before this test. If the test shows that you don't have a lot of sugar in your blood, you don't have gestational diabetes.  Part two (oral glucose tolerance test, or OGTT): If the first test shows a lot of sugar in your blood, then you may have an OGTT. You can't eat or drink for at least 8 hours before this test. A blood sample is taken, then you drink a sweet liquid. You have more blood tests after 1 to 3 hours. If the OGTT shows that you have a lot of sugar in your blood, you may have gestational diabetes. One-part screening. Sometimes doctors use the OGTT on its own. If the test shows that you don't have a lot of sugar in your blood, you don't have gestational diabetes. If youdo have a lot of sugar in your blood, you may have the condition. What are the risks of screening? Your blood glucose level may drop very low toward the end of the test. If this happens, you may feel weak, hungry, and restless. Tell your doctor if you havethese symptoms. The test usually will be stopped. You may vomit after drinking the sweet liquid. If this happens, you may need totake the test at a later time. Your doctor may do more glucose tests at other times during your pregnancy. Follow-up care is a key part of your treatment and safety. Be sure to make and go to all appointments, and call your doctor if you are having problems. It's also a good idea to know your test results and keep alist of the medicines you take. Where can you learn more?   Go to [email or web address] and sign in to your [employer] account. Enter H555 in the [facility] box to learn more about \"Learning About Screening for Gestational Diabetes. \"     If you do not have an account, please click on the \"Sign Up Now\" link. Current as of: July 28, 2021               Content Version: 13.3  © 7702-1455 [employer], [facility]. Care instructions adapted under license by [facility] ([facility]). If you have questions about a medical condition or this instruction, always ask your healthcare professional. [place] [address] any warranty or liability for your use of this information.

## 2022-08-03 NOTE — PATIENT INSTRUCTIONS
Patient Education        Counting Your Baby's Kicks: Care Instructions  Overview     Counting your baby's kicks is one way your doctor can tell that your baby is healthy. Most women--especially in a first pregnancy--feel their baby move for the first time between 16 and 22 weeks. The movement may feel like flutters rather than kicks. Your baby may move more at certain times of the day. When you are active, you may notice less kicking than when you are resting. At yourprenatal visits, your doctor will ask whether the baby is active. In your last trimester, your doctor may ask you to count the number of timesyou feel your baby move. Follow-up care is a key part of your treatment and safety. Be sure to make and go to all appointments, and call your doctor if you are having problems. It's also a good idea to know your test results and keep alist of the medicines you take. How do you count fetal kicks? A common method of checking your baby's movement is to note the length of time it takes to count ten movements (such as kicks, flutters, or rolls). Pick your baby's most active time of day to count. This may be any time from morning to evening. If you don't feel 10 movements in an hour, have something to eat or drink and count for another hour. If you don't feel at least 10 movements in the 2-hour period, call your doctor. When should you call for help? Call your doctor now or seek immediate medical care if:    You noticed that your baby has stopped moving or is moving much less than normal.   Watch closely for changes in your health, and be sure to contact your doctor ifyou have any problems. Where can you learn more? Go to https://Whispermichaelewtony.Kinesio Capture. org and sign in to your [employer] account. Enter F553 in the [employer] box to learn more about \"Counting Your Baby's Kicks: Care Instructions. \"     If you do not have an account, please click on the \"Sign Up Now\" link.   Current as of: February 23, 2022               Content Version: 13.3  © 2006-2022 [employer]. Care instructions adapted under license by [address] If you have questions about a medical condition or this instruction, always ask your healthcare professional. Audrey Franklin any warranty or liability for your use of this information. [facility]   One Hour Glucose Test Instructions    The 1 Hour Glucose test is designed to screen for gestational diabetes. This screening test is usually performed between 26-29 weeks of gestation. Gestational diabetes results in higher than normal blood sugar levels and can lead to pregnancy complications if not diagnosed and treated. For this reason, we recommend that all women undergo screening.  - You may eat or drink a normal breakfast or lunch prior to the test, but please avoid anything that contains excessive sugar. For example, do not eat sugary cereals, candy or drink soda or fruit juice.  - You will be given this drink at the Outpatient Lab (#[id number]) located on the 1st floor of [address] [place] [address] the entire bottle of the glucose drink, within 10 minutes and note the time that you finish the drink. Also, inform the  of the time that you finish. After drinking the glucose, you may only have water until your blood is drawn.    - Your blood needs to be drawn precisely 1 hour after you finished the glucose drink. If you have a prenatal appointment as well, when you arrive at the office please let the  know that you are doing the glucose test. Let her know the time you need to return to the lab area to have your blood drawn for the testing.  - You will also have a CBC drawn at this time to check your blood count and check your iron.      Please do not hesitate to call the office at [phone], option 2, if you have any additional questions   Patient Education        Weeks 26 to 30 of Your Pregnancy: Care Instructions  Overview     You are now entering your last trimester of pregnancy. Your baby is growing quickly. Nikki Lyonenedelia probably feel your baby moving around more often. Your doctormay ask you to count your baby's kicks. Your back may ache as your body gets used to your baby's size and length. If you haven't already had the Tdap shot during this pregnancy, talk to your doctor about getting it. It will help protect your  against pertussisinfection. During this time, it's important to take care of yourself and pay attention to what your body needs. If you feel sexual, you can explore ways to be close withyour partner that match your comfort and desire. Follow-up care is a key part of your treatment and safety. Be sure to make and go to all appointments, and call your doctor if you are having problems. It's also a good idea to know your test results and keep alist of the medicines you take. How can you care for yourself at home? Take it easy at work  Take frequent breaks. If possible, stop working when you are tired, and rest during your lunch hour. Take bathroom breaks every 2 hours. Change positions often. If you sit for long periods, stand up and walk around. When you stand for a long time, keep one foot on a low stool with your knee bent. After standing a lot, sit with your feet up. Avoid fumes, chemicals, and tobacco smoke. Be sexual in your own way  Having sex during pregnancy is okay, unless your doctor tells you not to. You may be very interested in sex, or you may have no interest at all. Your growing belly can make it hard to find a good position during intercourse. [place] and explore. You may get cramps in your uterus when your partner touches your breasts. A back rub may relieve the backache or cramps that sometimes follow orgasm. Learn about  labor  Watch for signs of  labor.  You may be going into labor if:  You have menstrual-like cramps, with or without nausea. You have about 6 or more contractions in 1 hour, even after you have had a glass of water and are resting. You have a low, dull backache that does not go away when you change your position. You have pain or pressure in your pelvis that comes and goes in a pattern. You have intestinal cramping or flu-like symptoms, with or without diarrhea. You notice an increase or change in your vaginal discharge. Discharge may be heavy, mucus-like, watery, or streaked with blood. Your water breaks. If you think you have  labor:  Drink 2 or 3 glasses of water or juice. Not drinking enough fluids can cause contractions. Stop what you are doing, and empty your bladder. Then lie down on your left side for at least 1 hour. While lying on your side, find your breast bone. Put your fingers in the soft spot just below it. Move your fingers down toward your belly button to find the top of your uterus. Check to see if it is tight. Contractions can be weak or strong. Record your contractions for an hour. Time a contraction from the start of one contraction to the start of the next one. Single or several strong contractions without a pattern are called Harvey-White contractions. They are practice contractions but not the start of labor. They often stop if you change what you are doing. Call your doctor if you have regular contractions. Where can you learn more? Go to https://chmelissa.healthEayun. org and sign in to your [employer] account. Enter A219 in the [facility] box to learn more about \"Weeks 26 to 30 of Your Pregnancy: Care Instructions. \"     If you do not have an account, please click on the \"Sign Up Now\" link. Current as of: 2022               Content Version: 13.3  © 5069-0262 Healthwise, [employer]. Care instructions adapted under license by [facility] ([facility]).  If you have questions about a medical condition or this instruction, always ask your healthcare professional. [address] any warranty or liability for your use of this information. Patient Education        Learning About Pregnancy  Your Care Instructions     Your health in the early weeks of your pregnancy is particularly important for your baby's health. Take good care of yourself. Anything you do that harms yourbody can also harm your baby. Make sure to go to all of your doctor appointments. Regular checkups will helpkeep you and your baby healthy. How can you care for yourself at home? Diet    Eat a balanced diet. Make sure your diet includes plenty of beans, peas, and leafy green vegetables. Do not skip meals or go for many hours without eating. If you are nauseated, try to eat a small, healthy snack every 2 to 3 hours. Do not eat fish that has a high level of mercury, such as shark, swordfish, or mackerel. Do not eat more than one can of tuna each week. Drink plenty of fluids. If you have kidney, heart, or liver disease and have to limit fluids, talk with your doctor before you increase the amount of fluids you drink. Cut down on caffeine, such as coffee, tea, and cola. Do not drink alcohol, such as beer, wine, or hard liquor. Take a multivitamin that contains at least 400 micrograms (mcg) of folic acid to help prevent birth defects. Fortified cereal and whole wheat bread are good additional sources of folic acid. Increase the calcium in your diet. Try to drink a quart of skim milk each day. You may also take calcium supplements and choose foods such as cheese and yogurt. Lifestyle    Make sure you go to your follow-up appointments. Get plenty of rest. You may be unusually tired while you are pregnant. Get at least 30 minutes of exercise on most days of the week. Walking is a good choice. If you have not exercised in the past, start out slowly. Take several short walks each day. Do not smoke.  If you need help quitting, talk to your doctor about stop-smoking programs. These can increase your chances of quitting for good. Do not touch cat feces or litter boxes. Also, wash your hands after you handle raw meat, and fully cook all meat before you eat it. Wear gloves when you work in the yard or garden, and wash your hands well when you are done. Cat feces, raw or undercooked meat, and contaminated dirt can cause an infection that may harm your baby or lead to a miscarriage. Avoid things that can make your body too hot and may be harmful to your baby, such as a hot tub or sauna. Or talk with your doctor before doing anything that raises your body temperature. Your doctor can tell you if it's safe. Avoid chemical fumes, paint fumes, or poisons. Do not use illegal drugs, marijuana, or alcohol. Medicines    Review all of your medicines with your doctor. Some of your routine medicines may need to be changed to protect your baby. Use acetaminophen (Tylenol) to relieve minor problems, such as a mild headache or backache or a mild fever with cold symptoms. Do not use nonsteroidal anti-inflammatory drugs (NSAIDs), such as ibuprofen (Advil, Motrin) or naproxen (Aleve), unless your doctor says it is okay. Do not take two or more pain medicines at the same time unless the doctor told you to. Many pain medicines have acetaminophen, which is Tylenol. Too much acetaminophen (Tylenol) can be harmful. Take your medicines exactly as prescribed. Call your doctor if you think you are having a problem with your medicine. To manage morning sickness    If you feel sick when you first wake up, try eating a small snack (such as crackers) before you get out of bed. Allow some time to digest the snack, and then get out of bed slowly. Do not skip meals or go for long periods without eating. An empty stomach can make nausea worse. Eat small, frequent meals instead of three large meals each day. Drink plenty of fluids.      Eat foods that are high in protein but low in fat. If you are taking iron supplements, ask your doctor if they are necessary. Iron can make nausea worse. Avoid any smells, such as coffee, that make you feel sick. Get lots of rest. Morning sickness may be worse when you are tired. Follow-up care is a key part of your treatment and safety. Be sure to make and go to all appointments, and call your doctor if you are having problems. It's also a good idea to know your test results and keep alist of the medicines you take. Where can you learn more? Go to https://EGG EnergypeNerdieseb.CoLucid Pharmaceuticals. org and sign in to your [employer] account. Enter M469 in the [employer] box to learn more about \"Learning About Pregnancy. \"     If you do not have an account, please click on the \"Sign Up Now\" link. Current as of: February 23, 2022               Content Version: 13.3  © 2006-2022 Healthwise, Incorporated. Care instructions adapted under license by [facility] ([facility]). If you have questions about a medical condition or this instruction, always ask your healthcare professional. [place] [address] any warranty or liability for your use of this information.

## 2022-08-04 ENCOUNTER — ROUTINE PRENATAL (OUTPATIENT)
Dept: OBGYN CLINIC | Age: 37
End: 2022-08-04

## 2022-08-04 VITALS — WEIGHT: 151 LBS | SYSTOLIC BLOOD PRESSURE: 100 MMHG | BODY MASS INDEX: 25.92 KG/M2 | DIASTOLIC BLOOD PRESSURE: 60 MMHG

## 2022-08-04 DIAGNOSIS — Z3A.27 27 WEEKS GESTATION OF PREGNANCY: Primary | ICD-10-CM

## 2022-08-04 DIAGNOSIS — Z36.9 ANTENATAL SCREENING ENCOUNTER: ICD-10-CM

## 2022-08-04 DIAGNOSIS — Z34.83 ENCOUNTER FOR SUPERVISION OF OTHER NORMAL PREGNANCY, THIRD TRIMESTER: ICD-10-CM

## 2022-08-04 DIAGNOSIS — Z13.32 ENCOUNTER FOR SCREENING FOR MATERNAL DEPRESSION: ICD-10-CM

## 2022-08-04 DIAGNOSIS — O34.211 MATERNAL CARE DUE TO LOW TRANSVERSE UTERINE SCAR FROM PREVIOUS CESAREAN DELIVERY: ICD-10-CM

## 2022-08-04 LAB
BASOPHILS ABSOLUTE: 0 K/UL (ref 0–0.2)
BASOPHILS RELATIVE PERCENT: 0.5 % (ref 0–1)
EOSINOPHILS ABSOLUTE: 0.1 K/UL (ref 0–0.6)
EOSINOPHILS RELATIVE PERCENT: 1.6 % (ref 0–5)
GLUCOSE, 1HR PP: 130 MG/DL (ref 75–140)
HCT VFR BLD CALC: 42.8 % (ref 37–47)
HEMOGLOBIN: 14.2 G/DL (ref 12–16)
IMMATURE GRANULOCYTES #: 0.1 K/UL
LYMPHOCYTES ABSOLUTE: 1.8 K/UL (ref 1.1–4.5)
LYMPHOCYTES RELATIVE PERCENT: 20.6 % (ref 20–40)
MCH RBC QN AUTO: 30.5 PG (ref 27–31)
MCHC RBC AUTO-ENTMCNC: 33.2 G/DL (ref 33–37)
MCV RBC AUTO: 91.8 FL (ref 81–99)
MONOCYTES ABSOLUTE: 0.5 K/UL (ref 0–0.9)
MONOCYTES RELATIVE PERCENT: 5.4 % (ref 0–10)
NEUTROPHILS ABSOLUTE: 6.1 K/UL (ref 1.5–7.5)
NEUTROPHILS RELATIVE PERCENT: 71.3 % (ref 50–65)
PDW BLD-RTO: 14.3 % (ref 11.5–14.5)
PLATELET # BLD: 160 K/UL (ref 130–400)
PMV BLD AUTO: 9.4 FL (ref 9.4–12.3)
RBC # BLD: 4.66 M/UL (ref 4.2–5.4)
WBC # BLD: 8.5 K/UL (ref 4.8–10.8)

## 2022-08-04 PROCEDURE — S3005 EVAL SELF-ASSESS DEPRESSION: HCPCS | Performed by: ADVANCED PRACTICE MIDWIFE

## 2022-08-04 PROCEDURE — 0502F SUBSEQUENT PRENATAL CARE: CPT | Performed by: ADVANCED PRACTICE MIDWIFE

## 2022-08-04 NOTE — PROGRESS NOTES
Patient presents today for routine prenatal care. Pt denies any vaginal leaking bleeding or contractions. + Fetal movement. Pt scored 11 on depression screening.

## 2022-08-04 NOTE — PROGRESS NOTES
CNM Prenatal Office Note  Subjective:  Monique Samuel is here for a return obstetrical visit. Today she is 27w6d weeks EGA. She is doing well, taking her PNV as directed and has no unusual complaints. She does not have vaginal bleeding, n/v, syncope, or headaches. Pt does feel fetal movement regularly. 1 hour GCT today. Rhogam n/a  Objective: Mother's Prenatal Vitals  BP: 100/60  Weight: 151 lb (68.5 kg)  Patient Position: Sitting  Prenatal Fetal Information  Fetal HR:   Movement: Present  Pt is A&Ox3, in no acute distress. Normocephalic, atraumatic. PERRL. Resp even and non-labored. Skin pink, warm & dry. Gravid abdomen. VELEZ's well. Gait steady. EPDS Screenin/30  Assessment:    IUP at 27w6d wks      Diagnosis Orders   1. 27 weeks gestation of pregnancy        2. Encounter for supervision of other normal pregnancy, third trimester        3. Encounter for screening for maternal depression  LA EVAL SELF-ASSESS DEPRESSION        Plan: Third trimester teaching completed including warning signs for pre-eclampsia (blurred vision, seeing spots/sparkles, sudden increased weight gain or profound edema, epigastric pain), FKC (decreased fetal movments),  labor ( contractions or watery discharge, vaginal bleeding, cramping), n/v, chills, and or fever. Instructed pt to come to office or go to LDR if these symptoms presented. Pt voiced understanding. Pt counseled on balanced nutrition, adequate fluid intake, taking PNV daily, and exercise along with  28 week labs today. Continue with routine prenatal care. RTC in 2 wks for prenatal visit      MEDICATIONS:  No orders of the defined types were placed in this encounter. ORDERS:  Orders Placed This Encounter   Procedures    LA EVAL SELF-ASSESS DEPRESSION       More than 50% of this 20 min visit was education and counseling.

## 2022-08-17 NOTE — PATIENT INSTRUCTIONS
Patient Education        Counting Your Baby's Kicks: Care Instructions  Overview     Counting your baby's kicks is one way your doctor can tell that your baby is healthy. Most women--especially in a first pregnancy--feel their baby move for the first time between 16 and 22 weeks. The movement may feel like flutters rather than kicks. Your baby may move more at certain times of the day. When you are active, you may notice less kicking than when you are resting. At yourprenatal visits, your doctor will ask whether the baby is active. In your last trimester, your doctor may ask you to count the number of timesyou feel your baby move. Follow-up care is a key part of your treatment and safety. Be sure to make and go to all appointments, and call your doctor if you are having problems. It's also a good idea to know your test results and keep alist of the medicines you take. How do you count fetal kicks? A common method of checking your baby's movement is to note the length of time it takes to count ten movements (such as kicks, flutters, or rolls). Pick your baby's most active time of day to count. This may be any time from morning to evening. If you don't feel 10 movements in an hour, have something to eat or drink and count for another hour. If you don't feel at least 10 movements in the 2-hour period, call your doctor. When should you call for help? Call your doctor now or seek immediate medical care if:    You noticed that your baby has stopped moving or is moving much less than normal.   Watch closely for changes in your health, and be sure to contact your doctor ifyou have any problems. Where can you learn more? Go to https://Storyvinepejose de jesusewtony.Access Scientific. org and sign in to your "[employer]" account. Enter L857 in the [employer] box to learn more about \"Counting Your Baby's Kicks: Care Instructions. \"     If you do not have an account, please click on the \"Sign Up Now\" link.   Current as of: 2022               Content Version: 13.3   Leap.it. Care instructions adapted under license by [facility] ([facility]). If you have questions about a medical condition or this instruction, always ask your healthcare professional. Audrey Franklin any warranty or liability for your use of this information. Patient Education        Weeks 30 to 28 of Your Pregnancy: Care Instructions  Overview     You've made it to the final months of your pregnancy! By now your baby isreally starting to look like a baby, with hair and plump skin. As you enter the final weeks of pregnancy, the reality of having a baby may start to set in. This is a good time to set up a safe nursery and find quality  if needed. Doing this stuff ahead of time will allow you to focus on caring for and enjoying your new baby. You may also want to take a tour of your hospital's labor and delivery unit. This will help you get a better idea ofwhat to expect while you're in the hospital.  During these last months, be sure to take good care of yourself. Pay attention to what your body needs. If your doctor says it's okay for you to work, don'tpush yourself too hard. If you haven't already had the Tdap shot during this pregnancy, talk to your doctor about getting it. It will help protect your  against pertussisinfection. Follow-up care is a key part of your treatment and safety. Be sure to make and go to all appointments, and call your doctor if you are having problems. It's also a good idea to know your test results and keep alist of the medicines you take. How can you care for yourself at home? Pay attention to your baby's movements  You should feel your baby move several times every day. Your baby now turns less, and kicks and jabs more. Your baby sleeps 20 to 45 minutes at a time and is more active at certain times of day.   If your doctor wants you to count your baby's kicks:  Empty your bladder, and lie on your side or relax in a comfortable chair. Write down your start time. Pay attention only to your baby's movements. Count any movement except hiccups. After you have counted 10 movements, write down your stop time. Write down how many minutes it took for your baby to move 10 times. If an hour goes by and you have not recorded 10 movements, have something to eat or drink and then count for another hour. If you don't record at least 10 movements in the 2-hour period, call your doctor. Ease heartburn  Eat small, frequent meals. Do not eat chocolate, peppermint, or very spicy foods. Avoid drinks with caffeine, such as coffee, tea, and sodas. Avoid bending over or lying down after meals. Take a short walk after you eat. If heartburn is a problem at night, do not eat for 2 hours before bedtime. Take antacids like Mylanta, Maalox, Rolaids, or Tums. Do not take antacids that have sodium bicarbonate. Care for varicose veins  Varicose veins are blood vessels that stretch out with the extra blood during pregnancy. Your legs may ache or throb. Most varicose veins will go away after the birth. Avoid standing for long periods of time. Sit with your legs crossed at the ankles, not the knees. Sit with your feet propped up. Avoid tight clothing or stockings. Wear support hose. Exercise regularly. Try walking for at least 30 minutes a day. Where can you learn more? Go to https://PacketFrontmichaelChunyu.healthCloudant. org and sign in to your [employer] account. Enter R457 in the [facility] box to learn more about \"Weeks 30 to 32 of Your Pregnancy: Care Instructions. \"     If you do not have an account, please click on the \"Sign Up Now\" link. Current as of: February 23, 2022               Content Version: 13.3  © 2006-2022 Healthwise, Incorporated. Care instructions adapted under license by [facility] ([facility]).  If you have questions about a medical condition or this instruction, always ask your healthcare professional. [place] [address] any warranty or liability for your use of this information.

## 2022-08-18 ENCOUNTER — ROUTINE PRENATAL (OUTPATIENT)
Dept: OBGYN CLINIC | Age: 37
End: 2022-08-18

## 2022-08-18 VITALS
HEART RATE: 89 BPM | DIASTOLIC BLOOD PRESSURE: 68 MMHG | BODY MASS INDEX: 26.61 KG/M2 | WEIGHT: 155 LBS | SYSTOLIC BLOOD PRESSURE: 102 MMHG

## 2022-08-18 DIAGNOSIS — Z34.83 ENCOUNTER FOR SUPERVISION OF OTHER NORMAL PREGNANCY, THIRD TRIMESTER: ICD-10-CM

## 2022-08-18 DIAGNOSIS — O34.211 MATERNAL CARE DUE TO LOW TRANSVERSE UTERINE SCAR FROM PREVIOUS CESAREAN DELIVERY: ICD-10-CM

## 2022-08-18 DIAGNOSIS — Z3A.29 29 WEEKS GESTATION OF PREGNANCY: Primary | ICD-10-CM

## 2022-08-18 PROCEDURE — 0502F SUBSEQUENT PRENATAL CARE: CPT | Performed by: ADVANCED PRACTICE MIDWIFE

## 2022-08-18 NOTE — PROGRESS NOTES
CNM Prenatal Office Note  Subjective:  Krystle Oneil is here for a return obstetrical visit. Today she is 29w6d weeks EGA. She is doing well, taking her PNV as directed and has no unusual complaints. She does not have  vaginal bleeding, n/v, syncope, or headaches. Pt does feel fetal movement regularly. Objective: Mother's Prenatal Vitals  BP: 102/68  Weight: 155 lb (70.3 kg)  Heart Rate: 89  Patient Position: Sitting  Prenatal Fetal Information  Fundal Height (cm): 30 cm  Fetal HR: 149  Movement: Present  Pt is A&Ox3, in no acute distress. Normocephalic, atraumatic. PERRL. Resp even and non-labored. Skin pink, warm & dry. Gravid abdomen. VELEZ's well. Gait steady. Assessment:    IUP at 29w6d wks      Diagnosis Orders   1. 29 weeks gestation of pregnancy        2. Encounter for supervision of other normal pregnancy, third trimester        3. Maternal care due to low transverse uterine scar from previous  delivery          Plan:  Pt counseled on balanced nutrition, adequate fluid intake, taking PNV daily, and exercise along with GHTN precautions, Kick count, and  labor  Continue with routine prenatal care.  surveillance not indicated  RTC in 2 wks for prenatal visit    MEDICATIONS:  No orders of the defined types were placed in this encounter. ORDERS:  No orders of the defined types were placed in this encounter. More than 50% of this 20 min visit was education and counseling.

## 2022-09-03 NOTE — PATIENT INSTRUCTIONS
Patient Education        Nutrition During Pregnancy: Care Instructions  Overview     Healthy eating when you are pregnant is important for you and your baby. It can help you feel well and have a successful pregnancy and delivery. During pregnancy your nutrition needs increase. Even if you have excellent eating habits, your doctor may recommend a multivitamin to make sure you get enoughiron and folic acid. You may wonder how much weight you should gain. In general, if you were at a healthy weight before you became pregnant, then you should gain between 25 and 35 pounds. If you were overweight before pregnancy, then you'll likely be advised to gain 15 to 25 pounds. If you were underweight before pregnancy, then you'll probably be advised to gain 28 to 40 pounds. Your doctor will work with you to set a weight goal that is right for you. Gaining a healthy amount ofweight helps you have a healthy baby. Follow-up care is a key part of your treatment and safety. Be sure to make and go to all appointments, and call your doctor if you are having problems. It's also a good idea to know your test results and keep alist of the medicines you take. How can you care for yourself at home? Eat plenty of fruits and vegetables. Include a variety of orange, yellow, and leafy dark-green vegetables every day. Choose whole-grain bread, cereal, and pasta. Good choices include whole wheat bread, whole wheat pasta, brown rice, and oatmeal.  Get 4 or more servings of milk and milk products each day. Good choices include nonfat or low-fat milk, yogurt, and cheese. If you cannot eat milk products, you can get calcium from calcium-fortified products such as orange juice, soy milk, and tofu. Other non-milk sources of calcium include leafy green vegetables, such as broccoli, kale, mustard greens, turnip greens, bok osman, and brussels sprouts. If you eat meat, pick lower-fat types.  Good choices include lean cuts of meat and chicken or turkey without the skin. Do not eat shark, swordfish, shabana mackerel, or tilefish. They have high levels of mercury, which is dangerous to your baby. You can eat up to 12 ounces a week of fish or shellfish that have low mercury levels. Good choices include shrimp, wild salmon, pollock, and catfish. Limit some other types of fish, such as white (albacore) tuna, to 4 oz (0.1 kg) a week. Heat lunch meats (such as turkey, ham, or bologna) to 165°F before you eat them. This reduces your risk of getting sick from a kind of bacteria that can be found in lunch meats. Do not eat unpasteurized soft cheeses, such as brie, feta, fresh mozzarella, and blue cheese. They have a bacteria that could harm your baby. Limit caffeine. If you drink coffee or tea, have no more than 1 cup a day. Caffeine is also found in stacy. Do not drink any alcohol. No amount of alcohol has been found to be safe during pregnancy. Do not diet or try to lose weight. For example, do not follow a low-carbohydrate diet. If you are overweight at the start of your pregnancy, your doctor will work with you to manage your weight gain. Tell your doctor about all vitamins and supplements you take. When should you call for help? Watch closely for changes in your health, and be sure to contact your doctor if you have any problems. Where can you learn more? Go to https://chmelissa.healthAC [email or web address] org and sign in to your [employer] account. Enter Y785 in the Screen box to learn more about \"Nutrition During Pregnancy: Care Instructions. \"     If you do not have an account, please click on the \"Sign Up Now\" link. Current as of: September 8, 2021               Content Version: 13.3  © 2006-2022 Healthwise, Incorporated. Care instructions adapted under license by [facility].  If you have questions about a medical condition or this instruction, always ask your healthcare professional. Audrey [place] any warranty or liability for your use of this information. Patient Education        Pregnancy Precautions: Care Instructions  Your Care Instructions     There is no sure way to prevent labor before your due date ( labor) or to prevent most other pregnancy problems. But there are things you can do to increase your chances of a healthy pregnancy. Go to your appointments, follow your doctor's advice, and take good care of yourself. Eat well, and exercise(if your doctor agrees). And make sure to drink plenty of water. Follow-up care is a key part of your treatment and safety. Be sure to make and go to all appointments, and call your doctor if you are having problems. It's also a good idea to know your test results and keep alist of the medicines you take. How can you care for yourself at home? Make sure you go to your prenatal appointments. At each visit, your doctor will check your blood pressure. Your doctor will also check to see if you have protein in your urine. High blood pressure and protein in urine are signs of preeclampsia. This condition can be dangerous for you and your baby. Drink plenty of fluids. Dehydration can cause contractions. If you have kidney, heart, or liver disease and have to limit fluids, talk with your doctor before you increase the amount of fluids you drink. Tell your doctor right away if you notice any symptoms of an infection, such as:  Burning when you urinate. A foul-smelling discharge from your vagina. Vaginal itching. Unexplained fever. Unusual pain or soreness in your uterus or lower belly. Eat a balanced diet. Include plenty of foods that are high in calcium and iron. Foods high in calcium include milk, cheese, yogurt, almonds, and broccoli. Foods high in iron include red meat, shellfish, poultry, eggs, beans, raisins, whole-grain bread, and leafy green vegetables. Do not smoke. If you need help quitting, talk to your doctor about stop-smoking programs and medicines.  These can increase your chances of quitting for good. Do not drink alcohol or use marijuana or illegal drugs. Follow your doctor's directions about activity. Your doctor will let you know how much, if any, exercise you can do. Ask your doctor if you can have sex. If you are at risk for early labor, your doctor may ask you to not have sex. Take care to prevent falls. During pregnancy, your joints are loose, and your balance is off. Sports such as bicycling, skiing, or in-line skating can increase your risk of falling. And don't ride horses or motorcycles, dive, water ski, scuba dive, or parachute jump while you are pregnant. Avoid things that can make your body too hot and may be harmful to your baby, such as a hot tub or sauna. Or talk with your doctor before doing anything that raises your body temperature. Your doctor can tell you if it's safe. Do not take any over-the-counter or herbal medicines or supplements without talking to your doctor or pharmacist first.  When should you call for help? Call 911  anytime you think you may need emergency care. For example, call if:    You passed out (lost consciousness). You have a seizure. You have severe vaginal bleeding. You have severe pain in your belly or pelvis. You have had fluid gushing or leaking from your vagina and you know or think the umbilical cord is bulging into your vagina. If this happens, immediately get down on your knees so your rear end (buttocks) is higher than your head. This will decrease the pressure on the cord until help arrives. Call your doctor now or seek immediate medical care if:    You have signs of preeclampsia, such as:  Sudden swelling of your face, hands, or feet. New vision problems (such as dimness, blurring, or seeing spots). A severe headache. You have any vaginal bleeding. You have belly pain or cramping. You have a fever. You have had regular contractions (with or without pain) for an hour.  This means that you have 8 or more within 1 hour or 4 or more in 20 minutes after you change your position and drink fluids. You have a sudden release of fluid from your vagina. You have low back pain or pelvic pressure that does not go away. You notice that your baby has stopped moving or is moving much less than normal.   Watch closely for changes in your health, and be sure to contact your doctor ifyou have any problems. Where can you learn more? Go to https://Discoursepepiceweb.Cirqle.nl. org and sign in to your [employer] account. Enter [id number] in the CoworkingON box to learn more about \"Pregnancy Precautions: Care Instructions. \"     If you do not have an account, please click on the \"Sign Up Now\" link. Current as of: February 23, 2022               Content Version: 13.3  © 2006-2022 [employer]. Care instructions adapted under license by [facility] ([facility]). If you have questions about a medical condition or this instruction, always ask your healthcare professional. [place] [address] any warranty or liability for your use of this information. Patient Education        Counting Your Baby's Kicks: Care Instructions  Overview     Counting your baby's kicks is one way your doctor can tell that your baby is healthy. Most women--especially in a first pregnancy--feel their baby move for the first time between 16 and 22 weeks. The movement may feel like flutters rather than kicks. Your baby may move more at certain times of the day. When you are active, you may notice less kicking than when you are resting. At yourprenatal visits, your doctor will ask whether the baby is active. In your last trimester, your doctor may ask you to count the number of timesyou feel your baby move. Follow-up care is a key part of your treatment and safety. Be sure to make and go to all appointments, and call your doctor if you are having problems.  It's also a good idea to know your test results and keep alist of the medicines you take. How do you count fetal kicks? A common method of checking your baby's movement is to note the length of time it takes to count ten movements (such as kicks, flutters, or rolls). Pick your baby's most active time of day to count. This may be any time from morning to evening. If you don't feel 10 movements in an hour, have something to eat or drink and count for another hour. If you don't feel at least 10 movements in the 2-hour period, call your doctor. When should you call for help? Call your doctor now or seek immediate medical care if:    You noticed that your baby has stopped moving or is moving much less than normal.   Watch closely for changes in your health, and be sure to contact your doctor ifyou have any problems. Where can you learn more? Go to https://[employer] (PGD)pepiceweb.Keoghs. org and sign in to your [employer] account. Enter L944 in the HighGround box to learn more about \"Counting Your Baby's Kicks: Care Instructions. \"     If you do not have an account, please click on the \"Sign Up Now\" link. Current as of: February 23, 2022               Content Version: 13.3  © 2006-2022 Lingt. Care instructions adapted under license by [facility] ([facility]). If you have questions about a medical condition or this instruction, always ask your healthcare professional. [place] [address] any warranty or liability for your use of this information. Patient Education        Preparing for Childbirth: Care Instructions  Overview     You are getting close to the birth of your child. For months, you've been taking care of yourself and the baby. Now you can still take steps that will help you have a healthy labor and birth. You can take classes to help prepare for the birth. You also can talk with your doctor about what you would like tohappen during your labor. Changes happen in the last 2 months of your pregnancy.  Your baby becomes too big to move around easily inside the uterus and may seem to move less. At the end of your pregnancy, your baby probably will settle into a head-down position. You will likely feel some pressure in your pelvis as you get close tothe birth. You may notice times when your belly tightens and becomes firm to the touch, then relaxes. These are called [place] contractions. They sometimes occur as often as every 10 to 20 minutes. These contractions usually stop when youare active. (True labor pains continue or increase if you move around.)  Rupture of your membranes (\"breaking of the water\") often is a sign that labor has started or is about to start. This happens when a hole or tear develops in the fluid-filled bag (amniotic sac) that surrounds and protects your baby. You may feel a huge gush of water or a steady trickle of fluid. Call your doctor or go to the hospital if you think this has happened. Contractions may start, orif you are already having contractions, they may get stronger. Follow-up care is a key part of your treatment and safety. Be sure to make and go to all appointments, and call your doctor if you are having problems. It's also a good idea to know your test results and keep alist of the medicines you take. How can you care for yourself at home? Get plenty of rest.  Take childbirth classes with your partner or . You will learn relaxation exercises that are helpful during labor. You also will learn what you can do to manage labor pain. If you have other children, take a class to learn how to help them adjust to the new baby. Develop a written birth plan, if you choose to, keeping in mind that labor is hard to predict and your plan may change after labor begins. Some of what a birth plan may address includes:  Where you would like to have your baby. This includes the building and the room. It could be the hospital's birthing room, a separate birthing center, or your own home.   Who you would like to assist with delivery of your baby. You may want your doctor, an obstetrician, or a certified nurse-midwife. Some women prefer a lay midwife or a  to provide support before and after delivery. Whether you want a , nurse, midwife, or  to give you support from early labor until after childbirth. What comfort measures you want. You may have to choose between walking around during labor or having the security of a heart monitor for your baby. You may want to listen to music during labor. You may know what position you want to be in (such as sitting, squatting, or reclining) for pushing. What pain relief you would like. There are several choices, so be sure to talk with your doctor about them. How you want you and your baby to spend the first few hours after birth. You may want to keep your baby with you for at least 1 hour after birth for bonding and early breastfeeding. You may want the hospital to delay some infant care steps, such as a vitamin K injection, so that you have calming time with your baby. You may not want visitors, or you may want other family members there. Know the early signs of labor, such as a steady ache low in your back. If you are going to a hospital or clinic to have your baby, have your bag ready to go. Pack a nightgown, robe, panties, socks, and slippers. You may want to bring your own soap, shampoo, brush, toothbrush, and toothpaste. Bring your own self-adhesive sanitary pads. In your baby's bag, bring an outfit, small blanket, and diapers. Have your car seat ready to go. When should you call for help? Watch closely for changes in your health, and be sure to contact your doctor if you have any questions about preparing for childbirth. Where can you learn more? Go to https://chjennifereb.healthMercator MedSystems. org and sign in to your [employer]ur account.  Enter O225 in the [employer] Meat box to learn more about \"Preparing for Childbirth: Care Instructions. \"     If you do not have an account, please click on the \"Sign Up Now\" link. Current as of: 2022               Content Version: 13.3   Healthwise, Polyera. Care instructions adapted under license by [facility] ([facility]). If you have questions about a medical condition or this instruction, always ask your healthcare professional. [address] any warranty or liability for your use of this information. Patient Education        Weeks 32 to 29 of Your Pregnancy: Care Instructions  Overview     During the last few weeks of your pregnancy, you may have more aches and pains. It's important to rest when you can. Your growing baby is putting more pressure on your bladder. So you may need to urinate more often. Hemorrhoids are also common. These are painful, itchy veinsin the rectal area. You may want to talk with your doctor about banking your baby's umbilical cord blood. This is the blood left in the cord after birth. If you want to save thisblood, you must arrange it ahead of time. You can't decide at the last minute. If you haven't already had the Tdap shot during this pregnancy, talk to your doctor about getting it. It will help protect your  against pertussisinfection. Follow-up care is a key part of your treatment and safety. Be sure to make and go to all appointments, and call your doctor if you are having problems. It's also a good idea to know your test results and keep alist of the medicines you take. How can you care for yourself at home? Ease hemorrhoids  Get more liquids, fruits, vegetables, and fiber in your diet. This will help keep your stools soft. Avoid sitting for too long. Lie on your left side several times a day. Clean yourself with soft, moist toilet paper. Or you can use witch hazel pads or personal hygiene pads. If you are uncomfortable, try ice packs. Or you can sit in a warm sitz bath.  Do these for 20 minutes at a time, as needed. Use hydrocortisone cream for pain and itching. Two examples are Anusol and Preparation H Hydrocortisone. Ask your doctor about taking an over-the-counter stool softener. Consider breastfeeding  Experts recommend breastfeeding for 1 year or longer. Breast milk may help protect your child from some health problems.  babies are less likely than formula-fed babies to:  Get ear infections, colds, diarrhea, and pneumonia. Be obese or get diabetes later in life. Breastfeeding causes the release of a hormone called oxytocin. This hormone may help your uterus shrink back faster. Breastfeeding may help you lose weight faster. Making milk burns calories. Breastfeeding can lower your risk of breast cancer, ovarian cancer, and osteoporosis. Decide about circumcision for your baby  As you make this decision, it may help to think about your personal, Spiritism, and family traditions. You get to decide if you will keep your baby's penis natural or if your baby will be circumcised. If you decide that you would like to have your baby circumcised, talk with your doctor. You can share your concerns about pain. And you can discuss your preferences for anesthesia. Where can you learn more? Go to https://KOEZY.Knock Knock. org and sign in to your Meme account. Enter Z368 in the [facility] box to learn more about \"Weeks 32 to 34 of Your Pregnancy: Care Instructions. \"     If you do not have an account, please click on the \"Sign Up Now\" link. Current as of: February 23, 2022               Content Version: 13.3  © 2006-2022 [email or web address]. Care instructions adapted under license by [facility] ([facility]). If you have questions about a medical condition or this instruction, always ask your healthcare professional. [place] [address] any warranty or liability for your use of this information.        Patient Education        Getting Ready for Baby: Care Instructions  Your Care Instructions     Congratulations! You are heading into an exciting adventure. You can make your entry into parenthood a little less hectic by planning now and gathering some of the items you will need. You will be too busy (and probably too tired) to [employer] shopping after the baby arrives. These tips will help you get started. Some items you may need to buy, but do not be shy about taking donations of clothes and other items from family and friends. Getting an early start can allow you to stock up over time, whichmight be easier on your wallet. Follow-up care is a key part of your treatment and safety. Be sure to make and go to all appointments, and call your doctor if you arehaving problems. What do you need to have at home? Freeze meals ahead  Try to cook and freeze meals in the weeks before the baby comes. Family and friends may be able to help cook meals. You may not have the time or the energy to cook in the first weeks after the baby arrives. Baby furniture and car seat  Make sure all the baby gear you buy meets safety standards set by the U.[place]. Consumer Product Safety Commission. Although most new items will likely meet these standards, older and used items may not. Equipment that has been used before may not be safe. Cribs should have less than 2.4 in. (6.1 cm) of space between the slats. Lower the mattress as your baby grows. Your baby may try to climb out of the crib if the mattress is not lowered. Baby walkers should not be used, according to recommendations from the [employer] of Pediatrics. Playpens should have spaces in the mesh material that are no greater than 0.25 in. (0.64 cm) across. Wood slats should be less than 2.4 in. (6.1 cm) apart. Look for a playpen or travel crib that has top rails that lock into position. This may prevent the rail from collapsing. Stroller wheels should be in a locked position before you put your baby in the stroller.  Use the straps to secure your baby so that your baby cannot lean out as he or she gets more mobile. Fasten any toys or bumpers so that they do not fall on your baby. Remove these items as soon as your infant can sit or get up on all fours. Make sure releases and hinges are out of your baby's reach, especially if the stroller can fold. High chairs should have a wide, stable base. Do not use booster seats that attach to the table. Always make sure the high chair is locked in the upright position before use. Use the safety straps, and stay with your baby at all times while he or she is in the high chair. Changing tables should have a railing on all sides that is 2 in. (5 cm) high. Try to use a slightly indented changing pad. Always use the safety strap, and keep one hand on your baby. Have diapers and other items handy, but keep them out of your baby's reach. (If you do not have a changing table, you can change your baby on a towel on the floor.)  Get a new car seat and put your baby in it every time you drive with him or her. Getting a new seat is the best way to make sure that a seat meets safety standards and has not already been used in an accident. Make sure the car seat is properly installed. See the maker's instructions. If you are not sure how to put in the car seat, have your car seat checked at a police station. Make sure the car seat is the right fit for your child's current age, weight, or height. For safety, it is very important to have a car seat that fits your child. And it is safest for the seat to face the rear until your child outgrows the height and weight requirement by the car seat . Put the car seat in a rear seat, not in the front passenger seat. This will keep your child from getting hurt by the air bag in an accident. If you have rear side air bags, put your child's car seat in the middle seat.   For more information about car seats, call the [employer] at 8-159-797-943-196-2233. Baby care items  Start stocking up on disposable diapers (unless you plan to use cloth diapers) and wipes. If you want, you can buy a few packages of  diapers, which come with a cutout in the front so the diaper does not touch the baby's umbilical cord stump. You also can buy regular infant diapers and roll down the front. You may get some baby clothes from family and friends at baby showers and after the baby arrives. Ask for (or buy) a few basics to get you started. Get several sleep sacks or nightgowns, T-shirts that snap at the crotch (called \"onesies\"), small blankets to wrap the baby in (called receiving blankets), and one-piece outfits that snap or zip down one leg. This is so that you do not have to take off all the baby's clothes to change a diaper. Socks or booties are also a good idea to keep your baby's feet warm. Get a cotton cap or two. Newborns also need their heads covered to stay warm. Put together a kit of baby care items. Include diaper rash cream, baby nail clippers, a nasal bulb syringe (to help clear a stuffy nose), and baby wash, which also can be used as shampoo. If you plan to breastfeed, buy a couple of nursing bras. You can wear one while the other one is in the wash. Also, you may want get a nipple cream that contains lanolin. Some women also like to put nursing pads in their bras to prevent breast milk from leaking onto their clothes. If you plan to bottle-feed, buy several cans of formula and several bottles to get you started. Where can you learn more? Go to https://optionsXpresspepiceweb.[employer]. org and sign in to your TransNet account. Enter D980 in the Intio box to learn more about \"Getting Ready for Baby: Care Instructions. \"     If you do not have an account, please click on the \"Sign Up Now\" link. Current as of: 2021               Content Version: 13.3  © 7874-5783 Healthwise, Incorporated.    Care instructions adapted shellfish, poultry, eggs, beans, raisins, whole-grain bread, and leafy green vegetables. Do not smoke. If you need help quitting, talk to your doctor about stop-smoking programs and medicines. These can increase your chances of quitting for good. Do not drink alcohol or use marijuana or illegal drugs. Follow your doctor's directions about activity. Your doctor will let you know how much, if any, exercise you can do. Ask your doctor if you can have sex. If you are at risk for early labor, your doctor may ask you to not have sex. Take care to prevent falls. During pregnancy, your joints are loose, and your balance is off. Sports such as bicycling, skiing, or in-line skating can increase your risk of falling. And don't ride horses or motorcycles, dive, water ski, scuba dive, or parachute jump while you are pregnant. Avoid things that can make your body too hot and may be harmful to your baby, such as a hot tub or sauna. Or talk with your doctor before doing anything that raises your body temperature. Your doctor can tell you if it's safe. Do not take any over-the-counter or herbal medicines or supplements without talking to your doctor or pharmacist first.  When should you call for help? Call 911  anytime you think you may need emergency care. For example, call if:    You passed out (lost consciousness). You have a seizure. You have severe vaginal bleeding. You have severe pain in your belly or pelvis. You have had fluid gushing or leaking from your vagina and you know or think the umbilical cord is bulging into your vagina. If this happens, immediately get down on your knees so your rear end (buttocks) is higher than your head. This will decrease the pressure on the cord until help arrives. Call your doctor now or seek immediate medical care if:    You have signs of preeclampsia, such as:  Sudden swelling of your face, hands, or feet.   New vision problems (such as dimness, blurring, or seeing spots). A severe headache. You have any vaginal bleeding. You have belly pain or cramping. You have a fever. You have had regular contractions (with or without pain) for an hour. This means that you have 8 or more within 1 hour or 4 or more in 20 minutes after you change your position and drink fluids. You have a sudden release of fluid from your vagina. You have low back pain or pelvic pressure that does not go away. You notice that your baby has stopped moving or is moving much less than normal.   Watch closely for changes in your health, and be sure to contact your doctor ifyou have any problems. Where can you learn more? Go to https://[place] World Trade Centerpepiceweb.healthDiurnal. org and sign in to your Needcheck account. Enter 6544-6498344 in the Matternet box to learn more about \"Pregnancy Precautions: Care Instructions. \"     If you do not have an account, please click on the \"Sign Up Now\" link. Current as of: February 23, 2022               Content Version: 13.3  © 2006-2022 Healthwise, Incorporated. Care instructions adapted under license by [facility] ([facility]). If you have questions about a medical condition or this instruction, always ask your healthcare professional. [address] any warranty or liability for your use of this information.

## 2022-09-06 ENCOUNTER — ROUTINE PRENATAL (OUTPATIENT)
Dept: OBGYN CLINIC | Age: 37
End: 2022-09-06

## 2022-09-06 VITALS — DIASTOLIC BLOOD PRESSURE: 60 MMHG | SYSTOLIC BLOOD PRESSURE: 100 MMHG | WEIGHT: 157 LBS | BODY MASS INDEX: 26.95 KG/M2

## 2022-09-06 DIAGNOSIS — Z3A.32 32 WEEKS GESTATION OF PREGNANCY: Primary | ICD-10-CM

## 2022-09-06 DIAGNOSIS — Z34.83 ENCOUNTER FOR SUPERVISION OF OTHER NORMAL PREGNANCY IN THIRD TRIMESTER: ICD-10-CM

## 2022-09-06 DIAGNOSIS — O34.219 PREVIOUS CESAREAN SECTION COMPLICATING PREGNANCY: ICD-10-CM

## 2022-09-06 PROCEDURE — 0502F SUBSEQUENT PRENATAL CARE: CPT | Performed by: ADVANCED PRACTICE MIDWIFE

## 2022-09-06 NOTE — PROGRESS NOTES
CNM Prenatal Office Note  Subjective:  Elvia Murrell is here for a return obstetrical visit. Today she is 32w4d weeks EGA. She is doing well, taking her PNV as directed and has no unusual complaints. She does not have  vaginal bleeding, n/v, syncope, or headaches. Pt does feel fetal movement regularly. Objective: Mother's Prenatal Vitals  BP: 100/60  Weight: 157 lb (71.2 kg)  Patient Position: Sitting  Prenatal Fetal Information  Fundal Height (cm): 32 cm  Fetal HR: 140  Movement: Present  Pt is A&Ox3, in no acute distress. Normocephalic, atraumatic. PERRL. Resp even and non-labored. Skin pink, warm & dry. Gravid abdomen. VELEZ's well. Gait steady. Assessment:    IUP at 32w4d wks      Diagnosis Orders   1. 32 weeks gestation of pregnancy        2. Encounter for supervision of other normal pregnancy in third trimester        3. Previous  section complicating pregnancy          Plan:  Pt counseled on balanced nutrition, adequate fluid intake, taking PNV daily, and exercise along with GHTN precautions, Kick count, and  labor  Continue with routine prenatal care.  surveillance not indicated  RTC in 2 wks for prenatal visit    MEDICATIONS:  No orders of the defined types were placed in this encounter. ORDERS:  No orders of the defined types were placed in this encounter. More than 50% of this 20 min visit was education and counseling.

## 2022-09-20 ENCOUNTER — ROUTINE PRENATAL (OUTPATIENT)
Dept: OBGYN CLINIC | Age: 37
End: 2022-09-20

## 2022-09-20 VITALS
BODY MASS INDEX: 27.12 KG/M2 | WEIGHT: 158 LBS | SYSTOLIC BLOOD PRESSURE: 107 MMHG | DIASTOLIC BLOOD PRESSURE: 71 MMHG | HEART RATE: 93 BPM

## 2022-09-20 DIAGNOSIS — Z3A.34 34 WEEKS GESTATION OF PREGNANCY: Primary | ICD-10-CM

## 2022-09-20 DIAGNOSIS — Z34.83 ENCOUNTER FOR SUPERVISION OF OTHER NORMAL PREGNANCY IN THIRD TRIMESTER: ICD-10-CM

## 2022-09-20 DIAGNOSIS — O34.219 PREVIOUS CESAREAN SECTION COMPLICATING PREGNANCY: ICD-10-CM

## 2022-09-20 PROCEDURE — 0502F SUBSEQUENT PRENATAL CARE: CPT | Performed by: ADVANCED PRACTICE MIDWIFE

## 2022-09-20 NOTE — PATIENT INSTRUCTIONS
Patient Education        Nutrition During Pregnancy: Care Instructions  Overview     Healthy eating when you are pregnant is important for you and your baby. It can help you feel well and have a successful pregnancy and delivery. During pregnancy your nutrition needs increase. Even if you have excellent eating habits, your doctor may recommend a multivitamin to make sure you get enough iron and folic acid. You may wonder how much weight you should gain. In general, if you were at a healthy weight before you became pregnant, then you should gain between 25 and 35 pounds. If you were overweight before pregnancy, then you'll likely be advised to gain 15 to 25 pounds. If you were underweight before pregnancy, then you'll probably be advised to gain 28 to 40 pounds. Your doctor will work with you to set a weight goal that is right for you. Gaining a healthy amount of weight helps you have a healthy baby. Follow-up care is a key part of your treatment and safety. Be sure to make and go to all appointments, and call your doctor if you are having problems. It's also a good idea to know your test results and keep a list of the medicines you take. How can you care for yourself at home? Eat plenty of fruits and vegetables. Include a variety of orange, yellow, and leafy dark-green vegetables every day. Choose whole-grain bread, cereal, and pasta. Good choices include whole wheat bread, whole wheat pasta, brown rice, and oatmeal.  Get 4 or more servings of milk and milk products each day. Good choices include nonfat or low-fat milk, yogurt, and cheese. If you cannot eat milk products, you can get calcium from calcium-fortified products such as orange juice, soy milk, and tofu. Other non-milk sources of calcium include leafy green vegetables, such as broccoli, kale, mustard greens, turnip greens, bok osman, and brussels sprouts. If you eat meat, pick lower-fat types.  Good choices include lean cuts of meat and chicken or turkey without the skin. Do not eat shark, swordfish, shabana mackerel, or tilefish. They have high levels of mercury, which is dangerous to your baby. You can eat up to 12 ounces a week of fish or shellfish that have low mercury levels. Good choices include shrimp, wild salmon, pollock, and catfish. Limit some other types of fish, such as white (albacore) tuna, to 4 oz (0.1 kg) a week. Heat lunch meats (such as turkey, ham, or bologna) to 165°F before you eat them. This reduces your risk of getting sick from a kind of bacteria that can be found in lunch meats. Do not eat unpasteurized soft cheeses, such as brie, feta, fresh mozzarella, and blue cheese. They have a bacteria that could harm your baby. Limit caffeine. If you drink coffee or tea, have no more than 1 cup a day. Caffeine is also found in stacy. Do not drink any alcohol. No amount of alcohol has been found to be safe during pregnancy. Do not diet or try to lose weight. For example, do not follow a low-carbohydrate diet. If you are overweight at the start of your pregnancy, your doctor will work with you to manage your weight gain. Tell your doctor about all vitamins and supplements you take. When should you call for help? Watch closely for changes in your health, and be sure to contact your doctor if you have any problems. Where can you learn more? Go to https://chpejose de jesusewtony.healthwhoplusyou. org and sign in to your Wibbitz account. Enter Y785 in the [facility] box to learn more about \"Nutrition During Pregnancy: Care Instructions. \"     If you do not have an account, please click on the \"Sign Up Now\" link. Current as of: May 9, 2022               Content Version: 13.4  © 4592-2245 Healthwise, Incorporated. Care instructions adapted under license by [facility]note 3] [address]  If you have questions about a medical condition or this instruction, always ask your healthcare professional. Ann Cuenca disclaims any warranty or liability for your use of this information. Patient Education        During Pregnancy: Exercises  Introduction  Here are some examples of exercises to do during your pregnancy. Start each exercise slowly. Ease off the exercise if you start to have pain. Your doctor or physical therapist will tell you when you can start these exercises and which ones will work best for you. How to do the exercises  Neck rotation  Sit in a firm chair, or stand up straight. Keeping your chin level, turn your head to the right, and hold for 15 to 30 seconds. Turn your head to the left and hold for 15 to 30 seconds. Repeat 2 to 4 times to each side. Forward neck flexion  Sit in a firm chair, or stand up straight. Bend your head forward. Hold for 15 to 30 seconds. Repeat 2 to 4 times. Back press  Place your feet 10 to 12 inches from the wall. Rest your back flat against the wall and slide down the wall until your knees are slightly bent. Press your lower back against the wall by pulling in your stomach muscles. Hold for 6 seconds, and then relax your stomach muscles and slide back up the wall. Repeat 8 to 12 times. Full body twist  Sit with your legs crossed. Reach your left hand toward your right foot, and place your right hand at your side for support. Slowly twist your torso to your right. Switch your hands and twist to your left. Repeat 2 to 4 times. Pelvic rocking  Kneeling on hands and knees, place your hands directly under your shoulders and your knees under your hips. Breathe in deeply. Tuck your head downward and round your back up, making a curve with your back in the shape of the letter C. Hold this position for a count of 6. Breathe out slowly and bring your head back up. Relax, keeping your back straight (don't allow it to curve toward the floor). Hold this for a count of 6. Do this exercise 8 times or to your comfort level. Pelvic tilt  This exercise strengthens your lower back and pelvis.  It is for use during the first 4 months of pregnancy. After this point, lying on your back is not recommended, because it can cause blood flow problems for you and your baby. Lie on your back. Keep your knees relaxed. Tighten your belly and buttocks muscles. At the same time, gently shift your pelvis upward. This should flatten the curve in your back. Hold for 6 seconds and then relax. Gradually increase the number of tilts you do each day, to your comfort level. Backward stretch  Kneel on hands and knees with your knees 8 to 10 inches apart, hands directly under your shoulders, and arms and back straight. Keeping your arms straight, slowly lower your buttocks toward your heels and tuck your head toward your knees. Hold for 15 to 30 seconds. Slowly return to the kneeling position. Repeat 2 to 4 times. Forward bend  Sit comfortably in a chair, with your arms relaxed. Slowly bend forward, allowing your arms to hang down in front of you. Lean only as far as you can without feeling discomfort or pressure on your belly. Hold for 15 to 30 seconds and then slowly sit up straight. Repeat 2 to 4 times or to your comfort level. Leg lift crawl  Kneeling on hands and knees, place your hands directly under your shoulders and straighten your arms. Tighten your belly muscles by pulling in your belly button toward your spine. Be sure you continue to breathe normally and do not hold your breath. Lift your left knee and bring it toward your elbow. Slowly extend your leg behind you without completely straightening it. Be careful not to let your hip drop down. Avoid arching your back. Hold your leg behind you for about 6 seconds. Return to your starting position. Do the same exercise with your other leg. Repeat 8 to 12 times for each leg. Tailor sitting  Sit on the floor. Bring your feet close to your body while crossing your ankles. Hold this position for as long as you are comfortable.   Tailor stretching  Sit on the floor with your back straight, legs about 12 inches apart, and feet relaxed outward. Stretch your hands forward toward your left foot, then sit up. Stretch your hands straight forward, then sit up. Stretch your hands forward toward your right foot, then sit up. Hold each stretch for 15 to 30 seconds. Repeat 2 to 4 times. Follow-up care is a key part of your treatment and safety. Be sure to make and go to all appointments, and call your doctor if you are having problems. It's also a good idea to know your test results and keep a list of the medicines you take. Current as of: February 23, 2022               Content Version: 13.4  © 2006-2022 U-NOTE. Care instructions adapted under license by [facility] ([facility]). If you have questions about a medical condition or this instruction, always ask your healthcare professional. [address] any warranty or liability for your use of this information. Patient Education        Learning About Dental Care During Pregnancy  Why is dental care important during pregnancy? It's important to take care of your body when you are pregnant. This includes your teeth and gums. A healthy mouth--and good dental habits--will help you and your baby. Taking care of your teeth while you are pregnant helps prevent cavities and other dental problems. Brush, floss, and try to limit sugary foods and drinks. Getting the right vitamins and nutrients is good for your baby's teeth, which begin to form before birth. And remember that it's safe--and a good idea--to visit the dentist during your pregnancy. What changes in your mouth during pregnancy? Some changes in your mouth during pregnancy are normal and should go away after your baby is born. You have more blood flow to the mucous membranes of the mouth and gums when you are pregnant. This may cause bleeding in your gums, especially when you brush your teeth.   Your gums may be more swollen and tender than usual. Try using a toothbrush with soft bristles. Your teeth might feel a little loose. This usually does not cause any problems and goes away after pregnancy. If you have morning sickness or digestive problems like reflux, stomach acid in your mouth can weaken your teeth. This may make them feel more sensitive and makes cavities more likely. Regular brushing can help. Keep brushing gently, even if your teeth feel more sensitive or your gums bleed. How can you care for your teeth during pregnancy? Keep brushing your teeth twice a day and try to floss once a day. It's fine to use toothpaste with fluoride in it while you are pregnant. Fluoride helps prevent tooth decay. If you gag when brushing, try using a toothbrush with a smaller head or toothpaste that doesn't foam when you brush. Eat a balanced, nutritious diet and get the right amount of vitamins and minerals. You may have cravings for sugary snacks and drinks, but too much sugar can lead to cavities. If you have reflux or morning sickness, rinse out your mouth. Try rinsing with a mixture of one teaspoon of baking soda in a cup of water. Wait at least 20 minutes before brushing. You might also ask your doctor if you can take over-the-counter medicine for reflux. Be safe with medicines. Read and follow all instructions on the label. Do not smoke when you are pregnant or allow others to smoke around you. If you need help quitting, talk to your doctor about stop-smoking programs and medicines. These can increase your chances of quitting for good. Regular visits to your dentist during pregnancy are important to prevent problems. Tell your dentist that you are pregnant. Dental X-rays and local anesthesia are generally safe during pregnancy. Most dental work can be done while you are pregnant. If you go to the dentist during the second or third trimesters, you may be more comfortable sitting in a different position in the dental chair.   Delaying dental care can make a problem worse. Follow-up care is a key part of your treatment and safety. Be sure to make and go to all appointments, and call your doctor if you are having problems. It's also a good idea to know your test results and keep a list of the medicines you take. Where can you learn more? Go to https://chpepiceweb.SentinelOne. org and sign in to your FetchDog account. Enter G168 in the [employer] box to learn more about \"Learning About Dental Care During Pregnancy. \"     If you do not have an account, please click on the \"Sign Up Now\" link. Current as of: February 23, 2022               Content Version: 13.4  © 2006-2022 Milestone Software. Care instructions adapted under license by [facility] ([facility]). If you have questions about a medical condition or this instruction, always ask your healthcare professional. [place] [address] any warranty or liability for your use of this information. Patient Education        Counting Your Baby's Kicks: Care Instructions  Overview     Counting your baby's kicks is one way your doctor can tell that your baby is healthy. Most women--especially in a first pregnancy--feel their baby move for the first time between 16 and 22 weeks. The movement may feel like flutters rather than kicks. Your baby may move more at certain times of the day. When you are active, you may notice less kicking than when you are resting. At your prenatal visits, your doctor will ask whether the baby is active. In your last trimester, your doctor may ask you to count the number of times you feel your baby move. Follow-up care is a key part of your treatment and safety. Be sure to make and go to all appointments, and call your doctor if you are having problems. It's also a good idea to know your test results and keep a list of the medicines you take. How do you count fetal kicks?   A common method of checking your baby's movement is to note the length of time it takes to count ten movements (such as kicks, flutters, or rolls). Pick your baby's most active time of day to count. This may be any time from morning to evening. If you don't feel 10 movements in an hour, have something to eat or drink and count for another hour. If you don't feel at least 10 movements in the 2-hour period, call your doctor. When should you call for help? Call your doctor now or seek immediate medical care if:    You noticed that your baby has stopped moving or is moving much less than normal.   Watch closely for changes in your health, and be sure to contact your doctor if you have any problems. Where can you learn more? Go to https://RevolverpeParLevel Systemseb.Media Lantern. org and sign in to your SlideMail account. Enter E088 in the ePark Systems box to learn more about \"Counting Your Baby's Kicks: Care Instructions. \"     If you do not have an account, please click on the \"Sign Up Now\" link. Current as of: February 23, 2022               Content Version: 13.4  © 9667-3827 Sprinklr. Care instructions adapted under license by [facility] ([facility]). If you have questions about a medical condition or this instruction, always ask your healthcare professional. [place] [address] any warranty or liability for your use of this information. Patient Education        Weeks 34 to 39 of Your Pregnancy: Care Instructions  Your belly has grown quite large. It's almost time to give birth! Your baby's lungs are almost ready to breathe air. The skull bones are firm enough to protect your baby's head. But they're soft enough to move down through the birth canal.  You might be wondering what to expect during labor. Because each birth is different, there's no way to know exactly what childbirth will be like for you. Talk to your doctor or midwife about any concerns you have. Nona Love probably have a test for group B streptococcus (GBS).  GBS is bacteria that can live in the vagina and rectum. GBS can make your baby sick after birth. If you test positive, you'll get antibiotics during labor. Choose what type of pain relief you would like during labor. You can choose from a few types, including medicine and non-medicine options. You may want to use several types of pain relief. Know how medicines can help with pain during labor. Some medicines lower anxiety and help with some of the pain. Others make your lower body numb so that you will feel less pain. Tell your doctor about your pain medicine choice. Do this before you start labor or very early in your labor. You may be able to change your mind during labor. Learn about the stages of labor. The first stage includes early labor, active labor, and transition. Contractions start in early labor. During active labor, contractions get stronger, last longer, and happen more often. In transition, your cervix stretches and contractions are very strong. The second stage starts when you're ready to push. During this stage, your baby is born. During the third stage, you'll have a few more contractions to push out the placenta. Follow-up care is a key part of your treatment and safety. Be sure to make and go to all appointments, and call your doctor if you are having problems. It's also a good idea to know your test results and keep a list of the medicines you take. Where can you learn more? Go to https://PlastiPurejenniferSolarWinds.Clash Media Advertising. org and sign in to your Massage Envy account. Enter E142 in the [facility] box to learn more about \"Weeks 34 to 36 of Your Pregnancy: Care Instructions. \"     If you do not have an account, please click on the \"Sign Up Now\" link. Current as of: February 23, 2022               Content Version: 13.4  © 7895-1568 Healthwise, Incorporated. Care instructions adapted under license by [facility] ([facility]).  If you have questions about a medical condition or this instruction, always ask your healthcare professional. [place] [address] any warranty or liability for your use of this information. Patient Education        Pain Relief During Labor: Care Instructions  Overview     You can choose from a few types of pain relief for childbirth. These include:  Medicine. Your doctor may offer different types of pain medicine while you are in labor. Comfort measures. This can include things like breathing techniques and massage. You also can use a combination of these choices. You can write down your choices for pain relief in a birth plan. A birth plan is a list of what you want during labor. Your personal needs are important when you make this choice. The right choice is the one that feels right to you. Every labor is different. Some women go into labor planning to use comfort measures only and later find that they need pain medicine. Plan for what you want. But be aware that you may change your mind during labor. Follow-up care is a key part of your treatment and safety. Be sure to make and go to all appointments, and call your doctor if you are having problems. It's also a good idea to know your test results and keep a list of the medicines you take. What medicines can you use for pain relief? If you decide to take medicine to help your pain during labor, here are some medicines that may be used. Local anesthesia. You get a shot of medicine to numb the area and relieve pain around the vaginal opening. Regional anesthesia. A doctor injects medicine into a space around the spinal cord. This is called an epidural. It's used during labor to numb your lower body. A spinal block is an injection of pain medicine into the spinal fluid. It quickly and fully numbs the pelvic area. In some cases, a doctor combines a spinal block with an epidural.  Opioids. These are pain relievers given through a vein or as a shot in the muscle. They can help ease anxiety and pain.  But they don't stop pain completely. Nitrous oxide. You can give yourself nitrous oxide through a mask when you need pain relief. You can move around and decide when to use it. What comfort measures can you use for pain relief? Breathing techniques: Breathing in a rhythm can distract you from pain. Childbirth classes can teach you how to do focused breathing. Distraction: You can walk, play cards, listen to music, watch TV, take a shower, or read. These can help take your mind off your contractions. Massage: Your birth partner can massage your shoulders and lower back during contractions. Strong massage of the back muscles during contractions may reduce back labor pain. Imagery: You can imagine a peaceful place. For instance, you can think of contractions as waves rolling over you. Laboring in water: Soaking in warm water may help with the pain and stress of labor. Acupuncture: Acupuncture treatment during labor may help you manage the pain. When should you call for help? Watch closely for changes in your health, and be sure to contact your doctor if:    You want to learn more about pain relief. Where can you learn more? Go to https://Siteskin Web Solutionjennifereb.Celsus Therapeutics. org and sign in to your Rachio account. Enter 91 Wireless Range in the [facility] box to learn more about \"Pain Relief During Labor: Care Instructions. \"     If you do not have an account, please click on the \"Sign Up Now\" link. Current as of: 2022               Content Version: 13.4   Rapid Pathogen Screening. Care instructions adapted under license by [facility] ([facility]). If you have questions about a medical condition or this instruction, always ask your healthcare professional. [place] [address] any warranty or liability for your use of this information.        Patient Education        Deciding About Vaginal Birth After   Overview  Years ago, if you had a  birth, all of your future babies had to be born this way. Today, even if you had a  in the past, you may be able to try to have a vaginal birth for the next baby. This is called vaginal birth after , or  (say \"VEE-back\").  isn't possible for some people. There may be concerns about their health and the baby's health. Ask your doctor or midwife if trying labor is right for you. You could still need a  even if you go into labor. Follow-up care is a key part of your treatment and safety. Be sure to make and go to all appointments, and call your doctor if you are having problems. It's also a good idea to know your test results and keep a list of the medicines you take. Why would you have a ? You want to experience a vaginal birth. The scar on your uterus is the kind that would allow a safe . You feel that the benefits of vaginal birth outweigh the small risk of the scar on your uterus breaking open during labor. If a scar breaks open, it can be very dangerous for both you and your baby. Why would you not be able to have a ? Your hospital does not offer . You are at greater risk of your uterus tearing because: The scar on your uterus is vertical. This kind of scar does not usually allow a safe . You have had more than two cesareans. You are carrying triplets or more. You have a placenta problem or another medical issue that could make a vaginal birth risky. Something happens during your pregnancy or labor that requires a . For example: You develop a problem with your blood pressure. Your baby is not head-down or is breech (bottom-down) or sideways. Your labor is not progressing well. Your baby is having problems. The scar on your uterus is bleeding or getting weak. Where can you learn more? Go to https://alba.Envoy Therapeutics. org and sign in to your CallMD account. Enter Y930 in the Marketecture box to learn more about \"Deciding About Vaginal Birth After . \" If you do not have an account, please click on the \"Sign Up Now\" link. Current as of: February 23, 2022               Content Version: 13.4  © 2006-2022 Healthwise, Incorporated. Care instructions adapted under license by [facility] ([facility]). If you have questions about a medical condition or this instruction, always ask your healthcare professional. [address] any warranty or liability for your use of this information.

## 2022-09-20 NOTE — PROGRESS NOTES
CNM Prenatal Office Note  Subjective:  Bella Real is here for a return obstetrical visit. Today she is 34w4d weeks EGA. She is doing well, taking her PNV as directed and has no unusual complaints. She does not have  vaginal bleeding, n/v, syncope, or headaches. Pt does feel fetal movement regularly. Objective: Mother's Prenatal Vitals  BP: 107/71  Weight: 158 lb (71.7 kg)  Heart Rate: 93  Patient Position: Sitting  Prenatal Fetal Information  Fetal HR: 144  Movement: Present  Pt is A&Ox3, in no acute distress. Normocephalic, atraumatic. PERRL. Resp even and non-labored. Skin pink, warm & dry. Gravid abdomen. VELEZ's well. Gait steady. Assessment:    IUP at 34w4d wks      Diagnosis Orders   1. 34 weeks gestation of pregnancy        2. Encounter for supervision of other normal pregnancy in third trimester        3. Previous  section complicating pregnancy          Plan:  Pt counseled on balanced nutrition, adequate fluid intake, taking PNV daily, and exercise along with GHTN precautions, Kick count, and  labor  Continue with routine prenatal care.  surveillance not indicated  RTC in 1 wks for prenatal visit  Plan GBS next visit    MEDICATIONS:  No orders of the defined types were placed in this encounter. ORDERS:  No orders of the defined types were placed in this encounter. More than 50% of this 20 min visit was education and counseling.

## 2022-09-27 NOTE — PATIENT INSTRUCTIONS
Patient Education        Nutrition During Pregnancy: Care Instructions  Overview     Healthy eating when you are pregnant is important for you and your baby. It can help you feel well and have a successful pregnancy and delivery. During pregnancy your nutrition needs increase. Even if you have excellent eating habits, your doctor may recommend a multivitamin to make sure you get enough iron and folic acid. You may wonder how much weight you should gain. In general, if you were at a healthy weight before you became pregnant, then you should gain between 25 and 35 pounds. If you were overweight before pregnancy, then you'll likely be advised to gain 15 to 25 pounds. If you were underweight before pregnancy, then you'll probably be advised to gain 28 to 40 pounds. Your doctor will work with you to set a weight goal that is right for you. Gaining a healthy amount of weight helps you have a healthy baby. Follow-up care is a key part of your treatment and safety. Be sure to make and go to all appointments, and call your doctor if you are having problems. It's also a good idea to know your test results and keep a list of the medicines you take. How can you care for yourself at home? Eat plenty of fruits and vegetables. Include a variety of orange, yellow, and leafy dark-green vegetables every day. Choose whole-grain bread, cereal, and pasta. Good choices include whole wheat bread, whole wheat pasta, brown rice, and oatmeal.  Get 4 or more servings of milk and milk products each day. Good choices include nonfat or low-fat milk, yogurt, and cheese. If you cannot eat milk products, you can get calcium from calcium-fortified products such as orange juice, soy milk, and tofu. Other non-milk sources of calcium include leafy green vegetables, such as broccoli, kale, mustard greens, turnip greens, bok osman, and brussels sprouts. If you eat meat, pick lower-fat types.  Good choices include lean cuts of meat and chicken or turkey without the skin. Do not eat shark, swordfish, shabana mackerel, or tilefish. They have high levels of mercury, which is dangerous to your baby. You can eat up to 12 ounces a week of fish or shellfish that have low mercury levels. Good choices include shrimp, wild salmon, pollock, and catfish. Limit some other types of fish, such as white (albacore) tuna, to 4 oz (0.1 kg) a week. Heat lunch meats (such as turkey, ham, or bologna) to 165°F before you eat them. This reduces your risk of getting sick from a kind of bacteria that can be found in lunch meats. Do not eat unpasteurized soft cheeses, such as brie, feta, fresh mozzarella, and blue cheese. They have a bacteria that could harm your baby. Limit caffeine. If you drink coffee or tea, have no more than 1 cup a day. Caffeine is also found in stacy. Do not drink any alcohol. No amount of alcohol has been found to be safe during pregnancy. Do not diet or try to lose weight. For example, do not follow a low-carbohydrate diet. If you are overweight at the start of your pregnancy, your doctor will work with you to manage your weight gain. Tell your doctor about all vitamins and supplements you take. When should you call for help? Watch closely for changes in your health, and be sure to contact your doctor if you have any problems. Where can you learn more? Go to https://chpejose de jesuseweb.[email or web address] org and sign in to your Azimo account. Enter Y785 in the [facility] box to learn more about \"Nutrition During Pregnancy: Care Instructions. \"     If you do not have an account, please click on the \"Sign Up Now\" link. Current as of: May 9, 2022               Content Version: 13.4  © 9921-1181 Healthwise, Incorporated. Care instructions adapted under license by [facility] ([facility]).  If you have questions about a medical condition or this instruction, always ask your healthcare professional. [employer] disclaims any warranty or liability for your use of this information. Patient Education        Counting Your Baby's Kicks: Care Instructions  Overview     Counting your baby's kicks is one way your doctor can tell that your baby is healthy. Most women--especially in a first pregnancy--feel their baby move for the first time between 16 and 22 weeks. The movement may feel like flutters rather than kicks. Your baby may move more at certain times of the day. When you are active, you may notice less kicking than when you are resting. At your prenatal visits, your doctor will ask whether the baby is active. In your last trimester, your doctor may ask you to count the number of times you feel your baby move. Follow-up care is a key part of your treatment and safety. Be sure to make and go to all appointments, and call your doctor if you are having problems. It's also a good idea to know your test results and keep a list of the medicines you take. How do you count fetal kicks? A common method of checking your baby's movement is to note the length of time it takes to count ten movements (such as kicks, flutters, or rolls). Pick your baby's most active time of day to count. This may be any time from morning to evening. If you don't feel 10 movements in an hour, have something to eat or drink and count for another hour. If you don't feel at least 10 movements in the 2-hour period, call your doctor. When should you call for help? Call your doctor now or seek immediate medical care if:    You noticed that your baby has stopped moving or is moving much less than normal.   Watch closely for changes in your health, and be sure to contact your doctor if you have any problems. Where can you learn more? Go to https://codetagmelissa.Alimera Sciences. org and sign in to your Signostics account. Enter X161 in the Peg Bandwidth box to learn more about \"Counting Your Baby's Kicks: Care Instructions. \"     If you do not have an account, please click on the \"Sign Up Now\" link. Current as of: February 23, 2022               Content Version: 13.4  © 2006-2022 [employer]. Care instructions adapted under license by [facility] ([facility]). If you have questions about a medical condition or this instruction, always ask your healthcare professional. Audrey Franklin any warranty or liability for your use of this information. Patient Education        Weeks 34 to 39 of Your Pregnancy: Care Instructions  Your belly has grown quite large. It's almost time to give birth! Your baby's lungs are almost ready to breathe air. The skull bones are firm enough to protect your baby's head. But they're soft enough to move down through the birth canal.  You might be wondering what to expect during labor. Because each birth is different, there's no way to know exactly what childbirth will be like for you. Talk to your doctor or midwife about any concerns you have. Jorge Pineda probably have a test for group B streptococcus (GBS). GBS is bacteria that can live in the vagina and rectum. GBS can make your baby sick after birth. If you test positive, you'll get antibiotics during labor. Choose what type of pain relief you would like during labor. You can choose from a few types, including medicine and non-medicine options. You may want to use several types of pain relief. Know how medicines can help with pain during labor. Some medicines lower anxiety and help with some of the pain. Others make your lower body numb so that you will feel less pain. Tell your doctor about your pain medicine choice. Do this before you start labor or very early in your labor. You may be able to change your mind during labor. Learn about the stages of labor. The first stage includes early labor, active labor, and transition. Contractions start in early labor. During active labor, contractions get stronger, last longer, and happen more often.  In transition, your cervix stretches and contractions are very strong. The second stage starts when you're ready to push. During this stage, your baby is born. During the third stage, you'll have a few more contractions to push out the placenta. Follow-up care is a key part of your treatment and safety. Be sure to make and go to all appointments, and call your doctor if you are having problems. It's also a good idea to know your test results and keep a list of the medicines you take. Where can you learn more? Go to https://coCommentjennifereb.LensAR. org and sign in to your NearWoo account. Enter M290 in the Span[employer] box to learn more about \"Weeks 34 to 36 of Your Pregnancy: Care Instructions. \"     If you do not have an account, please click on the \"Sign Up Now\" link. Current as of: February 23, 2022               Content Version: 13.4  © 2006-2022 Bragster. Care instructions adapted under license by [facility]. If you have questions about a medical condition or this instruction, always ask your healthcare professional. [place] [address] any warranty or liability for your use of this information. Patient Education        Pain Relief During Labor: Care Instructions  Overview     You can choose from a few types of pain relief for childbirth. These include:  Medicine. Your doctor may offer different types of pain medicine while you are in labor. Comfort measures. This can include things like breathing techniques and massage. You also can use a combination of these choices. You can write down your choices for pain relief in a birth plan. A birth plan is a list of what you want during labor. Your personal needs are important when you make this choice. The right choice is the one that feels right to you. Every labor is different. Some women go into labor planning to use comfort measures only and later find that they need pain medicine. Plan for what you want.  But be aware that you may change your mind during labor. Follow-up care is a key part of your treatment and safety. Be sure to make and go to all appointments, and call your doctor if you are having problems. It's also a good idea to know your test results and keep a list of the medicines you take. What medicines can you use for pain relief? If you decide to take medicine to help your pain during labor, here are some medicines that may be used. Local anesthesia. You get a shot of medicine to numb the area and relieve pain around the vaginal opening. Regional anesthesia. A doctor injects medicine into a space around the spinal cord. This is called an epidural. It's used during labor to numb your lower body. A spinal block is an injection of pain medicine into the spinal fluid. It quickly and fully numbs the pelvic area. In some cases, a doctor combines a spinal block with an epidural.  Opioids. These are pain relievers given through a vein or as a shot in the muscle. They can help ease anxiety and pain. But they don't stop pain completely. Nitrous oxide. You can give yourself nitrous oxide through a mask when you need pain relief. You can move around and decide when to use it. What comfort measures can you use for pain relief? Breathing techniques: Breathing in a rhythm can distract you from pain. Childbirth classes can teach you how to do focused breathing. Distraction: You can walk, play cards, listen to music, watch TV, take a shower, or read. These can help take your mind off your contractions. Massage: Your birth partner can massage your shoulders and lower back during contractions. Strong massage of the back muscles during contractions may reduce back labor pain. Imagery: You can imagine a peaceful place. For instance, you can think of contractions as waves rolling over you. Laboring in water: Soaking in warm water may help with the pain and stress of labor.   Acupuncture: Acupuncture treatment during labor may help you manage the pain. When should you call for help? Watch closely for changes in your health, and be sure to contact your doctor if:    You want to learn more about pain relief. Where can you learn more? Go to https://alba.Asoka. org and sign in to your Big Game Hunters account. Enter Yisel Cerda in the Cloudadmin box to learn more about \"Pain Relief During Labor: Care Instructions. \"     If you do not have an account, please click on the \"Sign Up Now\" link. Current as of: 2022               Content Version: 13.4   Faculte. Care instructions adapted under license by [facility] ([facility]). If you have questions about a medical condition or this instruction, always ask your healthcare professional. [address] any warranty or liability for your use of this information. Patient Education        Deciding About Vaginal Birth After   Overview  Years ago, if you had a  birth, all of your future babies had to be born this way. Today, even if you had a  in the past, you may be able to try to have a vaginal birth for the next baby. This is called vaginal birth after , or  (say \"VEE-back\").  isn't possible for some people. There may be concerns about their health and the baby's health. Ask your doctor or midwife if trying labor is right for you. You could still need a  even if you go into labor. Follow-up care is a key part of your treatment and safety. Be sure to make and go to all appointments, and call your doctor if you are having problems. It's also a good idea to know your test results and keep a list of the medicines you take. Why would you have a ? You want to experience a vaginal birth. The scar on your uterus is the kind that would allow a safe . You feel that the benefits of vaginal birth outweigh the small risk of the scar on your uterus breaking open during labor.  If a scar breaks open, it can be very dangerous for both you and your baby. Why would you not be able to have a ? Your hospital does not offer . You are at greater risk of your uterus tearing because: The scar on your uterus is vertical. This kind of scar does not usually allow a safe . You have had more than two cesareans. You are carrying triplets or more. You have a placenta problem or another medical issue that could make a vaginal birth risky. Something happens during your pregnancy or labor that requires a . For example: You develop a problem with your blood pressure. Your baby is not head-down or is breech (bottom-down) or sideways. Your labor is not progressing well. Your baby is having problems. The scar on your uterus is bleeding or getting weak. Where can you learn more? Go to https://Marathon TechnologiespeOmniataanneCrowdFanatic.Total-trax. org and sign in to your GreenRoad Technologies account. Enter H076 in the Stoke box to learn more about \"Deciding About Vaginal Birth After . \"     If you do not have an account, please click on the \"Sign Up Now\" link. Current as of: 2022               Content Version: 13.4  © 6080-1241 The Loadown. Care instructions adapted under license by [facility] ([facility]). If you have questions about a medical condition or this instruction, always ask your healthcare professional. [place] [address] any warranty or liability for your use of this information. Patient Education        Group B Strep During Pregnancy: Care Instructions  Overview     Group B strep infection is caused by a type of bacteria. It's a different kind of bacteria than the kind that causes strep throat. You may have this kind of bacteria in your body. Sometimes it may cause an infection, but most of the time it doesn't make you sick or cause symptoms.  But if you pass the bacteria to your baby during the birth, it can cause serious health problems for your baby. If you have this bacteria in your body, you will get antibiotics when you are in labor. Antibiotics help prevent problems for a  baby. After birth, doctors will watch and may test your baby. If your baby tests positive for Group B strep, your baby will get antibiotics. If you plan to breastfeed your baby, don't worry. It will be safe to breastfeed. Follow-up care is a key part of your treatment and safety. Be sure to make and go to all appointments, and call your doctor if you are having problems. It's also a good idea to know your test results and keep a list of the medicines you take. How can you care for yourself at home? If your doctor has prescribed antibiotics, take them as directed. Do not stop taking them just because you feel better. You need to take the full course of antibiotics. Tell your doctor if you are allergic to any antibiotic. If you go into labor, or your water breaks, go to the hospital. Your doctor will give you antibiotics to help protect your baby from infection. Tell the doctors and nurses if you have an allergy to penicillin. Tell the doctors and nurses at the hospital that you tested positive for group B strep. When should you call for help? Call your doctor now or seek immediate medical care if:    You have symptoms of a urinary tract infection. These may include:  Pain or burning when you urinate. A frequent need to urinate without being able to pass much urine. Pain in the flank, which is just below the rib cage and above the waist on either side of the back. Blood in your urine. A fever. You think you are in labor or your water has broken. You have pain in your belly or pelvis. Watch closely for changes in your health, and be sure to contact your doctor if you have any problems. Where can you learn more? Go to https://alba.ViewRay. org and sign in to your SnapRetail account.  Enter M001 in the Piccsy box to learn more about \"Group B Strep During Pregnancy: Care Instructions. \"     If you do not have an account, please click on the \"Sign Up Now\" link. Current as of: February 9, 2022               Content Version: 13.4  © 7995-5286 Healthwise, Incorporated. Care instructions adapted under license by [facility] ([facility]). If you have questions about a medical condition or this instruction, always ask your healthcare professional. [address] any warranty or liability for your use of this information.

## 2022-09-28 ENCOUNTER — ROUTINE PRENATAL (OUTPATIENT)
Dept: OBGYN CLINIC | Age: 37
End: 2022-09-28
Payer: COMMERCIAL

## 2022-09-28 VITALS
BODY MASS INDEX: 27.81 KG/M2 | SYSTOLIC BLOOD PRESSURE: 105 MMHG | DIASTOLIC BLOOD PRESSURE: 73 MMHG | HEART RATE: 91 BPM | WEIGHT: 162 LBS

## 2022-09-28 DIAGNOSIS — Z3A.35 35 WEEKS GESTATION OF PREGNANCY: Primary | ICD-10-CM

## 2022-09-28 DIAGNOSIS — O34.219 PREVIOUS CESAREAN SECTION COMPLICATING PREGNANCY: ICD-10-CM

## 2022-09-28 DIAGNOSIS — O09.523 AMA (ADVANCED MATERNAL AGE) MULTIGRAVIDA 35+, THIRD TRIMESTER: ICD-10-CM

## 2022-09-28 DIAGNOSIS — Z34.83 ENCOUNTER FOR SUPERVISION OF OTHER NORMAL PREGNANCY IN THIRD TRIMESTER: ICD-10-CM

## 2022-09-28 DIAGNOSIS — Z36.85 ENCOUNTER FOR ANTENATAL SCREENING FOR STREPTOCOCCUS B: ICD-10-CM

## 2022-09-28 PROCEDURE — 59025 FETAL NON-STRESS TEST: CPT | Performed by: ADVANCED PRACTICE MIDWIFE

## 2022-09-28 PROCEDURE — 0502F SUBSEQUENT PRENATAL CARE: CPT | Performed by: ADVANCED PRACTICE MIDWIFE

## 2022-09-28 NOTE — PROGRESS NOTES
CNM Prenatal Office Note  Subjective:  Zach Sun is here for a return obstetrical visit. Today she is 35w5d weeks EGA. She is doing well, taking her PNV as directed and has no unusual complaints. She does not havevaginal bleeding, n/v, syncope, or headaches. Pt does feel fetal movement regularly. Objective: Mother's Prenatal Vitals  BP: 105/73  Weight: 162 lb (73.5 kg)  Heart Rate: 91  Patient Position: Sitting  Prenatal Fetal Information  Fundal Height (cm): 36 cm  Fetal HR:   Movement: Present  Cervical Exam  Dilation (cm): 1  Effacement: 50  Station: -3  Station (Labor Curve Graph): 8  Presentation: Vertex  Dil/Eff/Sta  Dilation (cm): 1  Effacement: 50  Station: -3  Pt is A&Ox3, in no acute distress. Normocephalic, atraumatic. PERRL. Resp even and non-labored. Skin pink, warm & dry. Gravid abdomen. VELEZ's well. Gait steady. Assessment:    IUP at 35w5d wks      Diagnosis Orders   1. 35 weeks gestation of pregnancy        2. Encounter for supervision of other normal pregnancy in third trimester        3. Encounter for  screening for Streptococcus B  Culture, Strep B Screen, Vaginal/Rectal      4. Previous  section complicating pregnancy        5. AMA (advanced maternal age) multigravida 33+, third trimester  [place] FETAL NON-STRESS TEST        Plan:   GBS collected and labor education completed. Encourage perineal massage. Discussed pain management options during labor and birth plan. Pt counseled on counseled on balanced nutrition, adequate fluid intake, taking PNV daily, and exercise along with GHTN precautions, Kick count, and  labor  Continue with routine prenatal care.  surveillance not indicated  RTC in 1 wks for prenatal visit    MEDICATIONS:  No orders of the defined types were placed in this encounter.     ORDERS:  Orders Placed This Encounter   Procedures    Culture, Strep B Screen, Vaginal/Rectal    [place] FETAL NON-STRESS TEST       More than 50% of this 20 min visit was education and counseling.     ORDERS:  Orders Placed This Encounter   Procedures    Culture, Strep B Screen, Vaginal/Rectal    [place] FETAL NON-STRESS TEST

## 2022-09-28 NOTE — PROGRESS NOTES
Patient presents today for routine prenatal care. Pt denies any vaginal leaking bleeding or contractions. + Fetal movement. GBS done today.

## 2022-09-29 LAB — STREP B DNA AMP: NOT DETECTED

## 2022-10-04 NOTE — PATIENT INSTRUCTIONS
Patient Education        Counting Your Baby's Kicks: Care Instructions  Overview     Counting your baby's kicks is one way your doctor can tell that your baby is healthy. Most women--especially in a first pregnancy--feel their baby move for the first time between 16 and 22 weeks. The movement may feel like flutters rather than kicks. Your baby may move more at certain times of the day. When you are active, you may notice less kicking than when you are resting. At your prenatal visits, your doctor will ask whether the baby is active. In your last trimester, your doctor may ask you to count the number of times you feel your baby move. Follow-up care is a key part of your treatment and safety. Be sure to make and go to all appointments, and call your doctor if you are having problems. It's also a good idea to know your test results and keep a list of the medicines you take. How do you count fetal kicks? A common method of checking your baby's movement is to note the length of time it takes to count ten movements (such as kicks, flutters, or rolls). Pick your baby's most active time of day to count. This may be any time from morning to evening. If you don't feel 10 movements in an hour, have something to eat or drink and count for another hour. If you don't feel at least 10 movements in the 2-hour period, call your doctor. When should you call for help? Call your doctor now or seek immediate medical care if:    You noticed that your baby has stopped moving or is moving much less than normal.   Watch closely for changes in your health, and be sure to contact your doctor if you have any problems. Where can you learn more? Go to https://[employer]melissa.Skoodat. org and sign in to your NoteVault account. Enter R165 in the [place] box to learn more about \"Counting Your Baby's Kicks: Care Instructions. \"     If you do not have an account, please click on the \"Sign Up Now\" link.   Current as of: February 23, 2022               Content Version: 13.4  © 2006-2022 [employer]. Care instructions adapted under license by [facility] ([facility]). If you have questions about a medical condition or this instruction, always ask your healthcare professional. [address] any warranty or liability for your use of this information. Patient Education        Early Stage of Labor at Home: Care Instructions  Overview     If you came to the hospital while in early labor, your doctor may ask you to labor at home until your contractions are stronger. Many women stay at home during early labor. This is often the longest part of the birthing process. It may last up to 2 to 3 days. Contractions are mild to moderate and shorter (about 30 to 45 seconds). You can usually keep talking during them. Contractions may also be irregular, about 5 to 20 minutes apart. They may even stop for a while. It helps to stay as relaxed as you can during this time. You can spend some or all of your early labor at home or anywhere else you may be comfortable. If you live far from the hospital or birthing center, you may want to think about going somewhere nearby so you can get back to the hospital quickly. For some women, there may be benefits to staying home during early labor, such as avoiding medicines or procedures. As labor progresses, you'll shift from early labor to active labor. During this time, contractions get more intense. They occur more often, about every 2 to 3 minutes. They also last longer, about 50 to 70 seconds. You will feel them even when you change positions and walk or move around. It may be hard to tell if you are in active labor. If you aren't sure, call your doctor or midwife. As your labor progresses, check in with your doctor or midwife about when to come back to the hospital or birthing center.  You may have special instructions if your water broke or you tested positive for group B strep. Follow-up care is a key part of your treatment and safety. Be sure to make and go to all appointments, and call your doctor if you are having problems. It's also a good idea to know your test results and keep a list of the medicines you take. How can you care for yourself at home? Get support. Having a support person with you from early labor until after childbirth can have a positive effect on childbirth. Find distractions. During early labor, you can walk, play cards, watch TV, or listen to music to help take your mind off your contractions. Ask your partner, labor , or  for a massage. Shoulder and low back massage during contractions may ease your pain. Strong massage of the back muscles (counterpressure) during contractions may help relieve the pain of back labor. Tell your labor  exactly where to push and how hard to push. Use imagery. This means using your imagination to decrease your pain. For instance, to help manage pain, picture your contractions as waves rolling over you. Picture a peaceful place, such as a beach or mountain stream, to help you relax between contractions. Change positions during labor. Walking, kneeling, or sitting on a big rubber ball (birth ball) are good options. Use focused breathing techniques. Breathing in a rhythm can distract you from pain. Take a warm shower or bath. Warm water may ease pain and stress. When should you call for help? Call 911  anytime you think you may need emergency care. For example, call if:    You passed out (lost consciousness). You have a seizure. You have severe vaginal bleeding. You have severe pain in your belly or pelvis that doesn't get better between contractions. You have had fluid gushing or leaking from your vagina and you know or think the umbilical cord is bulging into your vagina. If this happens, immediately get down on your knees so your rear end (buttocks) is higher than your head.  This will decrease the pressure on the cord until help arrives. Call your doctor now or seek immediate medical care if:    You have new or worse signs of preeclampsia, such as:  Sudden swelling of your face, hands, or feet. New vision problems (such as dimness, blurring, or seeing spots). A severe headache. You have any vaginal bleeding. You have belly pain or cramping. You have a fever. You have had regular contractions (with or without pain) for an hour. This means that you have 8 or more within 1 hour or 4 or more in 20 minutes after you change your position and drink fluids. You have a sudden release of fluid from your vagina. You have low back pain or pelvic pressure that does not go away. You notice that your baby has stopped moving or is moving much less than normal.   Watch closely for changes in your health, and be sure to contact your doctor if you have any problems. Where can you learn more? Go to https://[place] [employer].Renkoo. org and sign in to your OpenRent account. Enter F001 in the [employer] box to learn more about \"Early Stage of Labor at Home: Care Instructions. \"     If you do not have an account, please click on the \"Sign Up Now\" link. Current as of: February 23, 2022               Content Version: 13.4  © 2006-2022 Healthwise, Incorporated. Care instructions adapted under license by [facility] ([facility]). If you have questions about a medical condition or this instruction, always ask your healthcare professional. [place] [address] any warranty or liability for your use of this information.

## 2022-10-05 ENCOUNTER — ROUTINE PRENATAL (OUTPATIENT)
Dept: OBGYN CLINIC | Age: 37
End: 2022-10-05

## 2022-10-05 VITALS
HEART RATE: 83 BPM | WEIGHT: 161 LBS | DIASTOLIC BLOOD PRESSURE: 66 MMHG | BODY MASS INDEX: 27.64 KG/M2 | SYSTOLIC BLOOD PRESSURE: 110 MMHG

## 2022-10-05 DIAGNOSIS — O34.219 PREVIOUS CESAREAN SECTION COMPLICATING PREGNANCY: ICD-10-CM

## 2022-10-05 DIAGNOSIS — Z3A.36 36 WEEKS GESTATION OF PREGNANCY: Primary | ICD-10-CM

## 2022-10-05 DIAGNOSIS — O09.523 AMA (ADVANCED MATERNAL AGE) MULTIGRAVIDA 35+, THIRD TRIMESTER: ICD-10-CM

## 2022-10-05 DIAGNOSIS — Z34.83 ENCOUNTER FOR SUPERVISION OF OTHER NORMAL PREGNANCY IN THIRD TRIMESTER: ICD-10-CM

## 2022-10-05 PROCEDURE — 0502F SUBSEQUENT PRENATAL CARE: CPT | Performed by: ADVANCED PRACTICE MIDWIFE

## 2022-10-05 NOTE — PROGRESS NOTES
CNM Prenatal Office Note  Subjective:  Juan Pablo Ackerman is here for a return obstetrical visit. Today she is 36w5d weeks EGA. She is doing well, taking her PNV as directed and has no unusual complaints. She does not have vaginal bleeding, n/v, syncope, or headaches. Pt does feel fetal movement regularly. Objective: Mother's Prenatal Vitals  BP: 110/66  Weight: 161 lb (73 kg)  Heart Rate: 83  Patient Position: Sitting  Prenatal Fetal Information  Fetal HR:   Movement: Present  Pt is A&Ox3, in no acute distress. Normocephalic, atraumatic. PERRL. Resp even and non-labored. Skin pink, warm & dry. Gravid abdomen. VELEZ's well. Gait steady. Assessment:    IUP at 36w5d wks      Diagnosis Orders   1. 36 weeks gestation of pregnancy        2. Encounter for supervision of other normal pregnancy in third trimester        3. Previous  section complicating pregnancy        4. AMA (advanced maternal age) multigravida 33+, third trimester          Plan:  Pt counseled on balanced nutrition, adequate fluid intake, taking PNV daily, and exercise along with labor precautions, GHTN precautions and Kick count  Continue with routine prenatal care.  surveillance indicated for AMA status - NST reactive   RTC in 1 wks for prenatal visit    MEDICATIONS:  No orders of the defined types were placed in this encounter. ORDERS:  No orders of the defined types were placed in this encounter. More than 50% of this 20 min visit was education and counseling.

## 2022-10-14 ENCOUNTER — ROUTINE PRENATAL (OUTPATIENT)
Dept: OBGYN CLINIC | Age: 37
End: 2022-10-14
Payer: COMMERCIAL

## 2022-10-14 VITALS
WEIGHT: 162 LBS | DIASTOLIC BLOOD PRESSURE: 74 MMHG | HEART RATE: 106 BPM | BODY MASS INDEX: 27.81 KG/M2 | SYSTOLIC BLOOD PRESSURE: 113 MMHG

## 2022-10-14 DIAGNOSIS — O34.219 PREVIOUS CESAREAN SECTION COMPLICATING PREGNANCY: ICD-10-CM

## 2022-10-14 DIAGNOSIS — O09.523 AMA (ADVANCED MATERNAL AGE) MULTIGRAVIDA 35+, THIRD TRIMESTER: Primary | ICD-10-CM

## 2022-10-14 DIAGNOSIS — Z23 NEED FOR TDAP VACCINATION: ICD-10-CM

## 2022-10-14 DIAGNOSIS — Z3A.38 38 WEEKS GESTATION OF PREGNANCY: ICD-10-CM

## 2022-10-14 PROCEDURE — 0502F SUBSEQUENT PRENATAL CARE: CPT | Performed by: NURSE PRACTITIONER

## 2022-10-14 PROCEDURE — 90715 TDAP VACCINE 7 YRS/> IM: CPT | Performed by: NURSE PRACTITIONER

## 2022-10-14 PROCEDURE — 59025 FETAL NON-STRESS TEST: CPT | Performed by: NURSE PRACTITIONER

## 2022-10-14 PROCEDURE — 90471 IMMUNIZATION ADMIN: CPT | Performed by: NURSE PRACTITIONER

## 2022-10-14 NOTE — PATIENT INSTRUCTIONS
Patient Education        Week 45 of Your Pregnancy: Care Instructions  Believe it or not, your baby is almost here. You may notice how your baby responds to sounds, warmth, cold, and light. You may even know what kind of music your baby likes. Even if you want a vaginal birth, it's a good idea to learn about  section ().  is the delivery of a baby through a cut (incision) in your belly. It's a major surgery, so it has more risks than a vaginal birth. During the first 2 weeks after the birth, limit visitors. Don't allow visitors who have colds or infections. Ask visitors to wash their hands before they touch your baby. And never let anyone smoke around your baby. Know about unplanned C-sections. Reasons for an unplanned  include labor that slows or stops, signs of distress in your baby, and high blood pressure or other problems for you. Know about planned C-sections. If your baby isn't in a head-down position for delivery (breech position), your doctor may plan a . Or you may have a planned  if you're having twins or more or if your baby weighs 9 pounds or more. Get as much help as you can while you're in the hospital.  You can learn about feeding, diapering, and bathing your baby. Talk to your doctor or midwife about how to care for the umbilical cord stump. If your baby will be circumcised, also ask about how to care for that. Ask friends or family for help, as you need it. If you can, have another adult in your home for at least 2 or 3 days after the birth. Try to nap when your baby naps. This may be your best chance to get some sleep. Watch for changes in your mental health. For the first 1 to 2 weeks after birth, it's common to cry or feel sad or irritable. If these feelings last for more than 2 weeks, you may have postpartum depression. Ask your doctor for help. Postpartum depression can be treated.    Follow-up care is a key part of your treatment and safety. Be sure to make and go to all appointments, and call your doctor if you are having problems. It's also a good idea to know your test results and keep a list of the medicines you take. Where can you learn more? Go to https://chpejose de jesuseweb.XIFIN. org and sign in to your [employer]t account. Enter B044 in the [facility] box to learn more about \"Week 38 of Your Pregnancy: Care Instructions. \"     If you do not have an account, please click on the \"Sign Up Now\" link. Current as of: February 23, 2022               Content Version: 13.4  © 3112-1794 Healthwise, Incorporated. Care instructions adapted under license by [facility] ([facility]). If you have questions about a medical condition or this instruction, always ask your healthcare professional. [address] any warranty or liability for your use of this information.

## 2022-10-14 NOTE — PROGRESS NOTES
Patient presents today for routine prenatal visit. Pt denies any vaginal leaking bleeding or contractions. + Fetal movement. Sheryl Mckeon is here for a return obstetrical visit. Today she is 38w0d weeks EGA. She is doing well and has no complaints. NST, mod reactivity with + acels. She  does not have vaginal bleeding, leaking of fluid, contractions. She does not have blurred vision, SOB, or increased swelling in legs or face. Pt does feel fetal movement regularly. O:   Vitals:    10/14/22 1502   BP: 113/74   Pulse: (!) 106   Weight: 162 lb (73.5 kg)     Pt is A&Ox3, in no acute distress. Normocephalic, atraumatic. PERRL. Resp even and non-labored. Skin pink, warm & dry. Gravid abdomen. VELEZ's well. Gait steady. See OB flowsheet. A: Normal IUP at 38w0d wks      Diagnosis Orders   1. AMA (advanced maternal age) multigravida 33+, third trimester  0 - [place] FETAL NON-STRESS TEST      2. Previous  section complicating pregnancy        3. 38 weeks gestation of pregnancy            P:   Pt counseled on labor precautions, PIH precautions and Kick count  Continue with routine prenatal care. RTC in 1 wk for prenatal visit and nst    MEDICATIONS:  No orders of the defined types were placed in this encounter.       ORDERS:  Orders Placed This Encounter   Procedures    0 - [place] FETAL NON-STRESS TEST

## 2022-10-14 NOTE — PROGRESS NOTES
After obtaining consent, and per orders of [facility] aprn, injection of tdap given in Right deltoid by Miriam Hayward. Patient instructed to remain in clinic for 20 minutes afterwards, and to report any adverse reaction to me immediately.

## 2022-10-18 ENCOUNTER — ROUTINE PRENATAL (OUTPATIENT)
Dept: OBGYN CLINIC | Age: 37
End: 2022-10-18
Payer: COMMERCIAL

## 2022-10-18 VITALS — DIASTOLIC BLOOD PRESSURE: 60 MMHG | SYSTOLIC BLOOD PRESSURE: 100 MMHG | BODY MASS INDEX: 27.81 KG/M2 | WEIGHT: 162 LBS

## 2022-10-18 DIAGNOSIS — Z3A.38 38 WEEKS GESTATION OF PREGNANCY: Primary | ICD-10-CM

## 2022-10-18 DIAGNOSIS — O34.219 PREVIOUS CESAREAN SECTION COMPLICATING PREGNANCY: ICD-10-CM

## 2022-10-18 DIAGNOSIS — O09.523 AMA (ADVANCED MATERNAL AGE) MULTIGRAVIDA 35+, THIRD TRIMESTER: ICD-10-CM

## 2022-10-18 DIAGNOSIS — Z34.83 ENCOUNTER FOR SUPERVISION OF OTHER NORMAL PREGNANCY IN THIRD TRIMESTER: ICD-10-CM

## 2022-10-18 PROCEDURE — 59025 FETAL NON-STRESS TEST: CPT | Performed by: ADVANCED PRACTICE MIDWIFE

## 2022-10-18 PROCEDURE — 0502F SUBSEQUENT PRENATAL CARE: CPT | Performed by: ADVANCED PRACTICE MIDWIFE

## 2022-10-18 NOTE — PROGRESS NOTES
CNM Prenatal Office Note  Subjective:  Meghan Singh is here for a return obstetrical visit. Today she is 38w4d weeks EGA. She is doing well, taking her PNV as directed and has no unusual complaints. She does not have vaginal bleeding, n/v, syncope, or headaches. Pt does feel fetal movement regularly. Objective: Mother's Prenatal Vitals  BP: 100/60  Weight: 162 lb (73.5 kg)  Patient Position: Sitting  Prenatal Fetal Information  Fundal Height (cm): 38 cm  Fetal HR:   Movement: Present  Cervical Exam  Dilation (cm): 3  Effacement: 80  Station: -1  Station (Labor Curve Graph): 6  Presentation: Vertex  Dil/Eff/Sta  Dilation (cm): 3  Effacement: 80  Station: -1  Pt is A&Ox3, in no acute distress. Normocephalic, atraumatic. PERRL. Resp even and non-labored. Skin pink, warm & dry. Gravid abdomen. VELEZ's well. Gait steady. Assessment:    IUP at 38w4d wks      Diagnosis Orders   1. 38 weeks gestation of pregnancy        2. AMA (advanced maternal age) multigravida 33+, third trimester        3. Previous  section complicating pregnancy        4. Encounter for supervision of other normal pregnancy in third trimester          Plan:  Pt counseled on balanced nutrition, adequate fluid intake, taking PNV daily, and exercise along with labor precautions, GHTN precautions, and Kick count  Continue with routine prenatal care.  surveillance not indicated  RTC in 1 wks for prenatal visit  Desires IOL next week, asked to return on Monday for vaginal exam and to make plan for IOL. MEDICATIONS:  No orders of the defined types were placed in this encounter. ORDERS:  No orders of the defined types were placed in this encounter. More than 50% of this 20 min visit was education and counseling.

## 2022-10-18 NOTE — PATIENT INSTRUCTIONS
Patient Education        Pregnancy Precautions: Care Instructions  Your Care Instructions     There is no sure way to prevent labor before your due date ( labor) or to prevent most other pregnancy problems. But there are things you can do to increase your chances of a healthy pregnancy. Go to your appointments, follow your doctor's advice, and take good care of yourself. Eat well, and exercise (if your doctor agrees). And make sure to drink plenty of water. Follow-up care is a key part of your treatment and safety. Be sure to make and go to all appointments, and call your doctor if you are having problems. It's also a good idea to know your test results and keep a list of the medicines you take. How can you care for yourself at home? Make sure you go to your prenatal appointments. At each visit, your doctor will check your blood pressure. Your doctor will also check to see if you have protein in your urine. High blood pressure and protein in urine are signs of preeclampsia. This condition can be dangerous for you and your baby. Drink plenty of fluids. Dehydration can cause contractions. If you have kidney, heart, or liver disease and have to limit fluids, talk with your doctor before you increase the amount of fluids you drink. Tell your doctor right away if you notice any symptoms of an infection, such as:  Burning when you urinate. A foul-smelling discharge from your vagina. Vaginal itching. Unexplained fever. Unusual pain or soreness in your uterus or lower belly. Eat a balanced diet. Include plenty of foods that are high in calcium and iron. Foods high in calcium include milk, cheese, yogurt, almonds, and broccoli. Foods high in iron include red meat, shellfish, poultry, eggs, beans, raisins, whole-grain bread, and leafy green vegetables. Do not smoke. If you need help quitting, talk to your doctor about stop-smoking programs and medicines.  These can increase your chances of quitting for good.  Do not drink alcohol or use marijuana or illegal drugs. Follow your doctor's directions about activity. Your doctor will let you know how much, if any, exercise you can do. Ask your doctor if you can have sex. If you are at risk for early labor, your doctor may ask you to not have sex. Take care to prevent falls. During pregnancy, your joints are loose, and your balance is off. Sports such as bicycling, skiing, or in-line skating can increase your risk of falling. And don't ride horses or motorcycles, dive, water ski, scuba dive, or parachute jump while you are pregnant. Avoid things that can make your body too hot and may be harmful to your baby, such as a hot tub or sauna. Or talk with your doctor before doing anything that raises your body temperature. Your doctor can tell you if it's safe. Do not take any over-the-counter or herbal medicines or supplements without talking to your doctor or pharmacist first.  When should you call for help? Call 911  anytime you think you may need emergency care. For example, call if:    You passed out (lost consciousness). You have a seizure. You have severe vaginal bleeding. You have severe pain in your belly or pelvis. You have had fluid gushing or leaking from your vagina and you know or think the umbilical cord is bulging into your vagina. If this happens, immediately get down on your knees so your rear end (buttocks) is higher than your head. This will decrease the pressure on the cord until help arrives. Call your doctor now or seek immediate medical care if:    You have signs of preeclampsia, such as:  Sudden swelling of your face, hands, or feet. New vision problems (such as dimness, blurring, or seeing spots). A severe headache. You have any vaginal bleeding. You have belly pain or cramping. You have a fever. You have had regular contractions (with or without pain) for an hour.  This means that you have 8 or more within 1 hour or 4 or more in 20 minutes after you change your position and drink fluids. You have a sudden release of fluid from your vagina. You have low back pain or pelvic pressure that does not go away. You notice that your baby has stopped moving or is moving much less than normal.   Watch closely for changes in your health, and be sure to contact your doctor if you have any problems. Where can you learn more? Go to https://chpepiceweb.healthMedigus. org and sign in to your Converser account. Enter [id number] in the Vitrinepix box to learn more about \"Pregnancy Precautions: Care Instructions. \"     If you do not have an account, please click on the \"Sign Up Now\" link. Current as of: February 23, 2022               Content Version: 13.4  © 2006-2022 [employer]. Care instructions adapted under license by [facility] ([facility]). If you have questions about a medical condition or this instruction, always ask your healthcare professional. [place] [address] any warranty or liability for your use of this information. Patient Education        Counting Your Baby's Kicks: Care Instructions  Overview     Counting your baby's kicks is one way your doctor can tell that your baby is healthy. Most women--especially in a first pregnancy--feel their baby move for the first time between 16 and 22 weeks. The movement may feel like flutters rather than kicks. Your baby may move more at certain times of the day. When you are active, you may notice less kicking than when you are resting. At your prenatal visits, your doctor will ask whether the baby is active. In your last trimester, your doctor may ask you to count the number of times you feel your baby move. Follow-up care is a key part of your treatment and safety. Be sure to make and go to all appointments, and call your doctor if you are having problems.  It's also a good idea to know your test results and keep a list of the medicines you take. How do you count fetal kicks? A common method of checking your baby's movement is to note the length of time it takes to count ten movements (such as kicks, flutters, or rolls). Pick your baby's most active time of day to count. This may be any time from morning to evening. If you don't feel 10 movements in an hour, have something to eat or drink and count for another hour. If you don't feel at least 10 movements in the 2-hour period, call your doctor. When should you call for help? Call your doctor now or seek immediate medical care if:    You noticed that your baby has stopped moving or is moving much less than normal.   Watch closely for changes in your health, and be sure to contact your doctor if you have any problems. Where can you learn more? Go to https://Dynamic Yieldpepiceweb.Offbeat Guides. org and sign in to your P2i account. Enter P200 in the Hairdressr box to learn more about \"Counting Your Baby's Kicks: Care Instructions. \"     If you do not have an account, please click on the \"Sign Up Now\" link. Current as of: 2022               Content Version: 13.4   Healthwise, Incorporated. Care instructions adapted under license by [facility] [employer] [facility] ([facility]). If you have questions about a medical condition or this instruction, always ask your healthcare professional. [place] [address] any warranty or liability for your use of this information. Patient Education        Advanced Maternal Age: Care Instructions  Overview     Advanced maternal age is the medical term for a pregnancy if you're 28 or older on your due date. You're likely to have a healthy baby. But a pregnancy at this age has a greater risk for problems than one at a younger age. These problems include  birth and preeclampsia. They also include gestational diabetes, problems with the placenta, and genetic problems. Most of these problems can't be prevented.  But it's best to catch any problems early. This is why your doctor will want to check you for diabetes. Your doctor will also check your blood pressure and urine at every visit. High blood pressure and protein in urine are signs of preeclampsia. You can decide if you want to have tests to find out if your fetus has certain genetic problems, such as Down syndrome. Fetus is the medical term for a baby before birth. There are many things you can't control about pregnancy. But there is a lot you can do to help you have a healthy pregnancy. Do your best to eat well. And try to get plenty of exercise and rest.  Follow-up care is a key part of your treatment and safety. Be sure to make and go to all appointments, and call your doctor if you are having problems. It's also a good idea to know your test results and keep a list of the medicines you take. How can you care for yourself at home? Talk with your doctor about prenatal screening tests. These can help find Down syndrome and other possible problems. Eat a balanced diet with plenty of protein, calcium, and iron. Be sure to include fruits, vegetables, and whole grains. Talk to your doctor about an exercise plan. You may enjoy walking, swimming, and prenatal yoga. Make sure that you get enough folic acid. Folic acid helps prevent some birth defects, especially if you take it before you get pregnant. Your doctor will tell you how much you need. You can take folic acid pills. Take your prenatal vitamins. Drink plenty of fluids. Dehydration can lead to contractions. If you have kidney, heart, or liver disease and have to limit fluids, talk with your doctor before you increase the amount of fluids you drink. Check with your doctor or pharmacist before you take any over-the-counter medicines, vitamins, herbal supplements, or home remedies. Do not drink alcohol. No amount of alcohol has been found to be safe during pregnancy. Do not smoke.  If you need help quitting, talk to your doctor about stop-smoking programs and medicines. These can increase your chances of quitting for good. When should you call for help? Watch closely for changes in your health, and be sure to contact your doctor if you have any problems. Where can you learn more? Go to https://alba.Eyefreight. org and sign in to your MyFit account. Enter C333 in the Chute box to learn more about \"Advanced Maternal Age: Care Instructions. \"     If you do not have an account, please click on the \"Sign Up Now\" link. Current as of: 2022               Content Version: 13.4   TalkLife. Care instructions adapted under license by [facility] ([facility]). If you have questions about a medical condition or this instruction, always ask your healthcare professional. [address] any warranty or liability for your use of this information. Patient Education        Week 45 of Your Pregnancy: Care Instructions  Believe it or not, your baby is almost here. You may notice how your baby responds to sounds, warmth, cold, and light. You may even know what kind of music your baby likes. Even if you want a vaginal birth, it's a good idea to learn about  section ().  is the delivery of a baby through a cut (incision) in your belly. It's a major surgery, so it has more risks than a vaginal birth. During the first 2 weeks after the birth, limit visitors. Don't allow visitors who have colds or infections. Ask visitors to wash their hands before they touch your baby. And never let anyone smoke around your baby. Know about unplanned C-sections. Reasons for an unplanned  include labor that slows or stops, signs of distress in your baby, and high blood pressure or other problems for you. Know about planned C-sections. If your baby isn't in a head-down position for delivery (breech position), your doctor may plan a .  Or you may have a planned  if you're having twins or more or if your baby weighs 9 pounds or more. Get as much help as you can while you're in the hospital.  You can learn about feeding, diapering, and bathing your baby. Talk to your doctor or midwife about how to care for the umbilical cord stump. If your baby will be circumcised, also ask about how to care for that. Ask friends or family for help, as you need it. If you can, have another adult in your home for at least 2 or 3 days after the birth. Try to nap when your baby naps. This may be your best chance to get some sleep. Watch for changes in your mental health. For the first 1 to 2 weeks after birth, it's common to cry or feel sad or irritable. If these feelings last for more than 2 weeks, you may have postpartum depression. Ask your doctor for help. Postpartum depression can be treated. Follow-up care is a key part of your treatment and safety. Be sure to make and go to all appointments, and call your doctor if you are having problems. It's also a good idea to know your test results and keep a list of the medicines you take. Where can you learn more? Go to https://Studio SBV.Shsunedu.com. org and sign in to your [employer] account. Enter B044 in the [facility] box to learn more about \"Week 38 of Your Pregnancy: Care Instructions. \"     If you do not have an account, please click on the \"Sign Up Now\" link. Current as of: 2022               Content Version: 13.4   Healthwise, Incorporated. Care instructions adapted under license by [employer] [facility] ([facility]). If you have questions about a medical condition or this instruction, always ask your healthcare professional. [place] [address] any warranty or liability for your use of this information.        Patient Education        Week 44 of Your Pregnancy: Care Instructions   babies can look different than what you see in pictures or movies. Their heads can be a strange shape right after birth. And they may have swollen eyes and red marks on their faces. You can still get pregnant even if you are breastfeeding. If you don't want to get pregnant, use birth control. Prepare to breastfeed. Continue to eat healthy foods. Keep taking your prenatal vitamins during breastfeeding if your doctor recommends it. Talk to your doctor before taking any medicines or supplements. Choose the right birth control for you. Intrauterine devices (IUDs) are placed in the uterus. If you plan ahead, the IUD can be placed right after giving birth. They work for years. Hormonal implants are placed under the skin of the arm. They also work for years. Depo-Provera is a shot. You get it every 3 months. Birth control pills can be used. They're taken every day. Tubal ligation (tying your tubes) and vasectomy are surgeries. They're permanent. Diaphragms, spermicide, and condoms must be used each time you have sex. If you used a diaphragm before, you should get refitted after the baby is born. Follow-up care is a key part of your treatment and safety. Be sure to make and go to all appointments, and call your doctor if you are having problems. It's also a good idea to know your test results and keep a list of the medicines you take. Where can you learn more? Go to https://chmelissa.[email or web address] org and sign in to your [email or web address] account. Enter S568 in the Fliqq box to learn more about \"Week 39 of Your Pregnancy: Care Instructions. \"     If you do not have an account, please click on the \"Sign Up Now\" link. Current as of: February 23, 2022               Content Version: 13.4  © 2051-9698 Healthwise, Incorporated. Care instructions adapted under license by [facility] ([facility]).  If you have questions about a medical condition or this instruction, always ask your healthcare professional. Heydi Mckeon disclaims any warranty or liability for your use of this information. Patient Education        Early Stage of Labor at Home: Care Instructions  Overview     If you came to the hospital while in early labor, your doctor may ask you to labor at home until your contractions are stronger. Many women stay at home during early labor. This is often the longest part of the birthing process. It may last up to 2 to 3 days. Contractions are mild to moderate and shorter (about 30 to 45 seconds). You can usually keep talking during them. Contractions may also be irregular, about 5 to 20 minutes apart. They may even stop for a while. It helps to stay as relaxed as you can during this time. You can spend some or all of your early labor at home or anywhere else you may be comfortable. If you live far from the hospital or birthing center, you may want to think about going somewhere nearby so you can get back to the hospital quickly. For some women, there may be benefits to staying home during early labor, such as avoiding medicines or procedures. As labor progresses, you'll shift from early labor to active labor. During this time, contractions get more intense. They occur more often, about every 2 to 3 minutes. They also last longer, about 50 to 70 seconds. You will feel them even when you change positions and walk or move around. It may be hard to tell if you are in active labor. If you aren't sure, call your doctor or midwife. As your labor progresses, check in with your doctor or midwife about when to come back to the hospital or birthing center. You may have special instructions if your water broke or you tested positive for group B strep. Follow-up care is a key part of your treatment and safety. Be sure to make and go to all appointments, and call your doctor if you are having problems. It's also a good idea to know your test results and keep a list of the medicines you take. How can you care for yourself at home? Get support.  Having a support person with you from early labor until after childbirth can have a positive effect on childbirth. Find distractions. During early labor, you can walk, play cards, watch TV, or listen to music to help take your mind off your contractions. Ask your partner, labor , or  for a massage. Shoulder and low back massage during contractions may ease your pain. Strong massage of the back muscles (counterpressure) during contractions may help relieve the pain of back labor. Tell your labor  exactly where to push and how hard to push. Use imagery. This means using your imagination to decrease your pain. For instance, to help manage pain, picture your contractions as waves rolling over you. Picture a peaceful place, such as a beach or mountain stream, to help you relax between contractions. Change positions during labor. Walking, kneeling, or sitting on a big rubber ball (birth ball) are good options. Use focused breathing techniques. Breathing in a rhythm can distract you from pain. Take a warm shower or bath. Warm water may ease pain and stress. When should you call for help? Call 911  anytime you think you may need emergency care. For example, call if:    You passed out (lost consciousness). You have a seizure. You have severe vaginal bleeding. You have severe pain in your belly or pelvis that doesn't get better between contractions. You have had fluid gushing or leaking from your vagina and you know or think the umbilical cord is bulging into your vagina. If this happens, immediately get down on your knees so your rear end (buttocks) is higher than your head. This will decrease the pressure on the cord until help arrives. Call your doctor now or seek immediate medical care if:    You have new or worse signs of preeclampsia, such as:  Sudden swelling of your face, hands, or feet. New vision problems (such as dimness, blurring, or seeing spots). A severe headache.      You have any vaginal bleeding. You have belly pain or cramping. You have a fever. You have had regular contractions (with or without pain) for an hour. This means that you have 8 or more within 1 hour or 4 or more in 20 minutes after you change your position and drink fluids. You have a sudden release of fluid from your vagina. You have low back pain or pelvic pressure that does not go away. You notice that your baby has stopped moving or is moving much less than normal.   Watch closely for changes in your health, and be sure to contact your doctor if you have any problems. Where can you learn more? Go to https://Issio Solutionspepiceweb.healthCRISPR THERAPEUTICS. org and sign in to your Intio account. Enter Q040 in the Oomnitza box to learn more about \"Early Stage of Labor at Home: Care Instructions. \"     If you do not have an account, please click on the \"Sign Up Now\" link. Current as of: February 23, 2022               Content Version: 13.4  © 2006-2022 Healthwise, Incorporated. Care instructions adapted under license by [facility] ([facility]). If you have questions about a medical condition or this instruction, always ask your healthcare professional. [place] [address] any warranty or liability for your use of this information.

## 2022-10-24 ENCOUNTER — ROUTINE PRENATAL (OUTPATIENT)
Dept: OBGYN CLINIC | Age: 37
End: 2022-10-24
Payer: COMMERCIAL

## 2022-10-24 ENCOUNTER — HOSPITAL ENCOUNTER (INPATIENT)
Age: 37
LOS: 3 days | Discharge: HOME | End: 2022-10-27
Attending: ADVANCED PRACTICE MIDWIFE | Admitting: ADVANCED PRACTICE MIDWIFE
Payer: COMMERCIAL

## 2022-10-24 VITALS — BODY MASS INDEX: 28.32 KG/M2 | SYSTOLIC BLOOD PRESSURE: 110 MMHG | WEIGHT: 165 LBS | DIASTOLIC BLOOD PRESSURE: 70 MMHG

## 2022-10-24 DIAGNOSIS — Z34.83 ENCOUNTER FOR SUPERVISION OF OTHER NORMAL PREGNANCY IN THIRD TRIMESTER: ICD-10-CM

## 2022-10-24 DIAGNOSIS — Z3A.39 39 WEEKS GESTATION OF PREGNANCY: Primary | ICD-10-CM

## 2022-10-24 DIAGNOSIS — O34.219 PREVIOUS CESAREAN SECTION COMPLICATING PREGNANCY: ICD-10-CM

## 2022-10-24 DIAGNOSIS — O09.523 AMA (ADVANCED MATERNAL AGE) MULTIGRAVIDA 35+, THIRD TRIMESTER: ICD-10-CM

## 2022-10-24 LAB
ABO/RH: NORMAL
AMPHETAMINE SCREEN, URINE: NEGATIVE
ANTIBODY SCREEN: NORMAL
BARBITURATE SCREEN URINE: NEGATIVE
BASOPHILS ABSOLUTE: 0 K/UL (ref 0–0.2)
BASOPHILS RELATIVE PERCENT: 0.3 % (ref 0–1)
BENZODIAZEPINE SCREEN, URINE: NEGATIVE
BUPRENORPHINE URINE: NEGATIVE
CANNABINOID SCREEN URINE: NEGATIVE
COCAINE METABOLITE SCREEN URINE: NEGATIVE
EOSINOPHILS ABSOLUTE: 0.1 K/UL (ref 0–0.6)
EOSINOPHILS RELATIVE PERCENT: 1.9 % (ref 0–5)
HCT VFR BLD CALC: 40.7 % (ref 37–47)
HEMOGLOBIN: 14.2 G/DL (ref 12–16)
IMMATURE GRANULOCYTES #: 0.1 K/UL
LYMPHOCYTES ABSOLUTE: 1.7 K/UL (ref 1.1–4.5)
LYMPHOCYTES RELATIVE PERCENT: 25.6 % (ref 20–40)
Lab: NORMAL
MCH RBC QN AUTO: 31.8 PG (ref 27–31)
MCHC RBC AUTO-ENTMCNC: 34.9 G/DL (ref 33–37)
MCV RBC AUTO: 91.3 FL (ref 81–99)
METHADONE SCREEN, URINE: NEGATIVE
METHAMPHETAMINE, URINE: NEGATIVE
MONOCYTES ABSOLUTE: 0.5 K/UL (ref 0–0.9)
MONOCYTES RELATIVE PERCENT: 7 % (ref 0–10)
NEUTROPHILS ABSOLUTE: 4.3 K/UL (ref 1.5–7.5)
NEUTROPHILS RELATIVE PERCENT: 64.5 % (ref 50–65)
OPIATE SCREEN URINE: NEGATIVE
OXYCODONE URINE: NEGATIVE
PDW BLD-RTO: 13.8 % (ref 11.5–14.5)
PHENCYCLIDINE SCREEN URINE: NEGATIVE
PLATELET # BLD: 174 K/UL (ref 130–400)
PMV BLD AUTO: 10.1 FL (ref 9.4–12.3)
PROPOXYPHENE SCREEN: NEGATIVE
RBC # BLD: 4.46 M/UL (ref 4.2–5.4)
TRICYCLIC, URINE: NEGATIVE
WBC # BLD: 6.7 K/UL (ref 4.8–10.8)

## 2022-10-24 PROCEDURE — 86592 SYPHILIS TEST NON-TREP QUAL: CPT

## 2022-10-24 PROCEDURE — 59025 FETAL NON-STRESS TEST: CPT | Performed by: ADVANCED PRACTICE MIDWIFE

## 2022-10-24 PROCEDURE — 1220000000 HC SEMI PRIVATE OB R&B

## 2022-10-24 PROCEDURE — 36415 COLL VENOUS BLD VENIPUNCTURE: CPT

## 2022-10-24 PROCEDURE — 0502F SUBSEQUENT PRENATAL CARE: CPT | Performed by: ADVANCED PRACTICE MIDWIFE

## 2022-10-24 PROCEDURE — 86900 BLOOD TYPING SEROLOGIC ABO: CPT

## 2022-10-24 PROCEDURE — 86901 BLOOD TYPING SEROLOGIC RH(D): CPT

## 2022-10-24 PROCEDURE — 85025 COMPLETE CBC W/AUTO DIFF WBC: CPT

## 2022-10-24 PROCEDURE — 99223 1ST HOSP IP/OBS HIGH 75: CPT | Performed by: ADVANCED PRACTICE MIDWIFE

## 2022-10-24 PROCEDURE — 80306 DRUG TEST PRSMV INSTRMNT: CPT

## 2022-10-24 PROCEDURE — 86850 RBC ANTIBODY SCREEN: CPT

## 2022-10-24 RX ORDER — SODIUM CHLORIDE 9 MG/ML
25 INJECTION, SOLUTION INTRAVENOUS PRN
Status: DISCONTINUED | OUTPATIENT
Start: 2022-10-24 | End: 2022-10-25 | Stop reason: CLARIF

## 2022-10-24 RX ORDER — SODIUM CHLORIDE, SODIUM LACTATE, POTASSIUM CHLORIDE, AND CALCIUM CHLORIDE .6; .31; .03; .02 G/100ML; G/100ML; G/100ML; G/100ML
500 INJECTION, SOLUTION INTRAVENOUS PRN
Status: DISCONTINUED | OUTPATIENT
Start: 2022-10-24 | End: 2022-10-26

## 2022-10-24 RX ORDER — SODIUM CHLORIDE 0.9 % (FLUSH) 0.9 %
5-40 SYRINGE (ML) INJECTION EVERY 12 HOURS SCHEDULED
Status: DISCONTINUED | OUTPATIENT
Start: 2022-10-24 | End: 2022-10-25 | Stop reason: CLARIF

## 2022-10-24 RX ORDER — SODIUM CHLORIDE, SODIUM LACTATE, POTASSIUM CHLORIDE, AND CALCIUM CHLORIDE .6; .31; .03; .02 G/100ML; G/100ML; G/100ML; G/100ML
1000 INJECTION, SOLUTION INTRAVENOUS PRN
Status: DISCONTINUED | OUTPATIENT
Start: 2022-10-24 | End: 2022-10-26

## 2022-10-24 RX ORDER — SODIUM CHLORIDE, SODIUM LACTATE, POTASSIUM CHLORIDE, CALCIUM CHLORIDE 600; 310; 30; 20 MG/100ML; MG/100ML; MG/100ML; MG/100ML
INJECTION, SOLUTION INTRAVENOUS CONTINUOUS
Status: DISCONTINUED | OUTPATIENT
Start: 2022-10-24 | End: 2022-10-26

## 2022-10-24 RX ORDER — CARBOPROST TROMETHAMINE 250 UG/ML
250 INJECTION, SOLUTION INTRAMUSCULAR PRN
Status: DISCONTINUED | OUTPATIENT
Start: 2022-10-24 | End: 2022-10-27 | Stop reason: HOSPADM

## 2022-10-24 RX ORDER — ONDANSETRON 2 MG/ML
4 INJECTION INTRAMUSCULAR; INTRAVENOUS EVERY 6 HOURS PRN
Status: DISCONTINUED | OUTPATIENT
Start: 2022-10-24 | End: 2022-10-27 | Stop reason: HOSPADM

## 2022-10-24 RX ORDER — MISOPROSTOL 200 UG/1
800 TABLET ORAL PRN
Status: DISCONTINUED | OUTPATIENT
Start: 2022-10-24 | End: 2022-10-27 | Stop reason: HOSPADM

## 2022-10-24 RX ORDER — METHYLERGONOVINE MALEATE 0.2 MG/ML
200 INJECTION INTRAVENOUS PRN
Status: DISCONTINUED | OUTPATIENT
Start: 2022-10-24 | End: 2022-10-27 | Stop reason: HOSPADM

## 2022-10-24 RX ORDER — SODIUM CHLORIDE 0.9 % (FLUSH) 0.9 %
5-40 SYRINGE (ML) INJECTION PRN
Status: DISCONTINUED | OUTPATIENT
Start: 2022-10-24 | End: 2022-10-25 | Stop reason: CLARIF

## 2022-10-24 RX ORDER — ACETAMINOPHEN 325 MG/1
650 TABLET ORAL EVERY 4 HOURS PRN
Status: DISCONTINUED | OUTPATIENT
Start: 2022-10-24 | End: 2022-10-26

## 2022-10-24 NOTE — PATIENT INSTRUCTIONS
Occupational Therapy   Progress Note     Luis Goldstein   MRN: [id number]     OT Date of Treatment: 19   OT Start Time: 1501  OT Stop Time: 1540  OT Total Time (min): 39 min    Billable Minutes:  Self Care/Home Management 16, Therapeutic Activity 15 and Therapeutic Exercise 9    Precautions: standard    Subjective   RN reports that patient is appropriate for OT. RN present intermittently for most of session. States that patient has not been appropriate for nippling secondary to increased RR at prior feeding times. RN and neurosurgery PA aware of increased swelling to subgaleal shunt.    Objective   Patient found with: pulse ox (continuous), telemetry, NG tube(Vapotherm); supine in isolette, swaddled    Pain Assessment:  Crying: brief x2  HR: WDL; HR decel x1 to 100s with possible reflux noted during diaper change  O2 Sats: brief desats to 80s with diaper change  RR: 67 bpm upon arrival with patient asleep; elevated to 80s-130s when awake and during cares  Expression: neutral, brow furrow    No apparent pain noted throughout session    Eye openin%  States of alertness: light sleep, quiet alert  Stress signs: brow furrow, finger splay, extension of LEs, grimace, tongue thrust    Treatment: Assessed RR manually while asleep x1 minute. Provided static touch and containment for positive sensory input and facilitation of flexion. Provided temperature assessment and diaper change, maintaining static touch/containment for positive sensory input/calming. Provided rest break. Feeding deferred due to significant sustained tachypnea once awake. Provided boundaries for B LE to provide proprioceptive input and return to flexion. Provided gentle stretches including hip/knee flexion, hip adduction and internal rotation to neutral, elbow extension x3-5 each. Offered pacifier and gloved finger for positive non-nutritive oral stim with no interest, facial grimace and averting. Repositioned pt L sidelying, swaddled for  Patient Education        Nutrition During Pregnancy: Care Instructions  Overview     Healthy eating when you are pregnant is important for you and your baby. It can help you feel well and have a successful pregnancy and delivery. During pregnancy your nutrition needs increase. Even if you have excellent eating habits, your doctor may recommend a multivitamin to make sure you get enough iron and folic acid. You may wonder how much weight you should gain. In general, if you were at a healthy weight before you became pregnant, then you should gain between 25 and 35 pounds. If you were overweight before pregnancy, then you'll likely be advised to gain 15 to 25 pounds. If you were underweight before pregnancy, then you'll probably be advised to gain 28 to 40 pounds. Your doctor will work with you to set a weight goal that is right for you. Gaining a healthy amount of weight helps you have a healthy baby. Follow-up care is a key part of your treatment and safety. Be sure to make and go to all appointments, and call your doctor if you are having problems. It's also a good idea to know your test results and keep a list of the medicines you take. How can you care for yourself at home? Eat plenty of fruits and vegetables. Include a variety of orange, yellow, and leafy dark-green vegetables every day. Choose whole-grain bread, cereal, and pasta. Good choices include whole wheat bread, whole wheat pasta, brown rice, and oatmeal.  Get 4 or more servings of milk and milk products each day. Good choices include nonfat or low-fat milk, yogurt, and cheese. If you cannot eat milk products, you can get calcium from calcium-fortified products such as orange juice, soy milk, and tofu. Other non-milk sources of calcium include leafy green vegetables, such as broccoli, kale, mustard greens, turnip greens, bok osman, and brussels sprouts. If you eat meat, pick lower-fat types.  Good choices include lean cuts of meat and chicken or containment; on head z-gabby to promote midline cervical positioning (noting L rotation preference and asymmetry with L posterolateral cranial flattening; increased swelling to R sided subgaleal shunt).    No family present for education.     Assessment   Summary/Analysis of evaluation: Pt with fairly poor tolerance to handling with increased tachypnea, facial and motor stress cues throughout session. Oral feeding attempt not appropriate or safe at this time secondary to significant increase in RR with minimal handling and disinterest/stress with non-nutritive stim. Continue to note range of motion limitations in extremities (LE>UE), and cranial molding asymmetry.   Progress toward previous goals: Continue goals; progressing  Multidisciplinary Problems     Occupational Therapy Goals        Problem: Occupational Therapy Goal    Goal Priority Disciplines Outcome Interventions   Occupational Therapy Goal     OT, PT/OT Ongoing (interventions implemented as appropriate)    Description:  Goals assessed 3/6/19. Goals to be met by: 2019    Pt to be properly positioned 100% of time by family & staff  Pt will remain in quiet organized state for 25% of session  Pt will tolerate tactile stimulation with <50% signs of stress during 3 consecutive sessions  Pt eyes will remain open for 25% of session  Parents will demonstrate dev handling caregiving techniques while pt is calm & organized  Pt will tolerate prom to all 4 extremities with no tightness noted  Family will be independent with hep for development stimulation   Pt will demonstrate fair suck and latch on pacifier in prep for oral feeding   Pt will bring hands to mouth & midline 2-3 times per session                           Patient would benefit from continued OT for oral/developmental stimulation, positioning, ROM, and family training.    Plan   Continue OT a minimum of 2 x/week to address oral/dev stimulation, positioning, family training, PROM.    Plan of Care  turkey without the skin. Do not eat shark, swordfish, shabana mackerel, or tilefish. They have high levels of mercury, which is dangerous to your baby. You can eat up to 12 ounces a week of fish or shellfish that have low mercury levels. Good choices include shrimp, wild salmon, pollock, and catfish. Limit some other types of fish, such as white (albacore) tuna, to 4 oz (0.1 kg) a week. Heat lunch meats (such as turkey, ham, or bologna) to 165°F before you eat them. This reduces your risk of getting sick from a kind of bacteria that can be found in lunch meats. Do not eat unpasteurized soft cheeses, such as brie, feta, fresh mozzarella, and blue cheese. They have a bacteria that could harm your baby. Limit caffeine. If you drink coffee or tea, have no more than 1 cup a day. Caffeine is also found in stacy. Do not drink any alcohol. No amount of alcohol has been found to be safe during pregnancy. Do not diet or try to lose weight. For example, do not follow a low-carbohydrate diet. If you are overweight at the start of your pregnancy, your doctor will work with you to manage your weight gain. Tell your doctor about all vitamins and supplements you take. When should you call for help? Watch closely for changes in your health, and be sure to contact your doctor if you have any problems. Where can you learn more? Go to https://chpejose de jesusewtony.healthSmartAngels.fr. org and sign in to your Solairedirect account. Enter Y785 in the [employer] box to learn more about \"Nutrition During Pregnancy: Care Instructions. \"     If you do not have an account, please click on the \"Sign Up Now\" link. Current as of: May 9, 2022               Content Version: 13.4  © 2006-2022 [employer]. Care instructions adapted under license by [employer].  If you have questions about a medical condition or this instruction, always ask your healthcare professional. Ishmael Marie disclaims any warranty or liability Expires: 06/04/19    KAYKAY Benito,[facility] 2019     for your use of this information. Patient Education        Counting Your Baby's Kicks: Care Instructions  Overview     Counting your baby's kicks is one way your doctor can tell that your baby is healthy. Most women--especially in a first pregnancy--feel their baby move for the first time between 16 and 22 weeks. The movement may feel like flutters rather than kicks. Your baby may move more at certain times of the day. When you are active, you may notice less kicking than when you are resting. At your prenatal visits, your doctor will ask whether the baby is active. In your last trimester, your doctor may ask you to count the number of times you feel your baby move. Follow-up care is a key part of your treatment and safety. Be sure to make and go to all appointments, and call your doctor if you are having problems. It's also a good idea to know your test results and keep a list of the medicines you take. How do you count fetal kicks? A common method of checking your baby's movement is to note the length of time it takes to count ten movements (such as kicks, flutters, or rolls). Pick your baby's most active time of day to count. This may be any time from morning to evening. If you don't feel 10 movements in an hour, have something to eat or drink and count for another hour. If you don't feel at least 10 movements in the 2-hour period, call your doctor. When should you call for help? Call your doctor now or seek immediate medical care if:    You noticed that your baby has stopped moving or is moving much less than normal.   Watch closely for changes in your health, and be sure to contact your doctor if you have any problems. Where can you learn more? Go to https://EyeScribesmelissa.GoPath Global. org and sign in to your e27 account. Enter S572 in the Knoda box to learn more about \"Counting Your Baby's Kicks: Care Instructions. \"     If you do not have an account, please click on the \"Sign Up Now\" link. Current as of: 2022               Content Version: 13.4   [employer]. Care instructions adapted under license by [facility] ([facility]). If you have questions about a medical condition or this instruction, always ask your healthcare professional. [address] any warranty or liability for your use of this information. Patient Education        Pregnancy Precautions: Care Instructions  Your Care Instructions     There is no sure way to prevent labor before your due date ( labor) or to prevent most other pregnancy problems. But there are things you can do to increase your chances of a healthy pregnancy. Go to your appointments, follow your doctor's advice, and take good care of yourself. Eat well, and exercise (if your doctor agrees). And make sure to drink plenty of water. Follow-up care is a key part of your treatment and safety. Be sure to make and go to all appointments, and call your doctor if you are having problems. It's also a good idea to know your test results and keep a list of the medicines you take. How can you care for yourself at home? Make sure you go to your prenatal appointments. At each visit, your doctor will check your blood pressure. Your doctor will also check to see if you have protein in your urine. High blood pressure and protein in urine are signs of preeclampsia. This condition can be dangerous for you and your baby. Drink plenty of fluids. Dehydration can cause contractions. If you have kidney, heart, or liver disease and have to limit fluids, talk with your doctor before you increase the amount of fluids you drink. Tell your doctor right away if you notice any symptoms of an infection, such as:  Burning when you urinate. A foul-smelling discharge from your vagina. Vaginal itching. Unexplained fever. Unusual pain or soreness in your uterus or lower belly. Eat a balanced diet.  Include plenty of foods that are high in calcium and iron. Foods high in calcium include milk, cheese, yogurt, almonds, and broccoli. Foods high in iron include red meat, shellfish, poultry, eggs, beans, raisins, whole-grain bread, and leafy green vegetables. Do not smoke. If you need help quitting, talk to your doctor about stop-smoking programs and medicines. These can increase your chances of quitting for good. Do not drink alcohol or use marijuana or illegal drugs. Follow your doctor's directions about activity. Your doctor will let you know how much, if any, exercise you can do. Ask your doctor if you can have sex. If you are at risk for early labor, your doctor may ask you to not have sex. Take care to prevent falls. During pregnancy, your joints are loose, and your balance is off. Sports such as bicycling, skiing, or in-line skating can increase your risk of falling. And don't ride horses or motorcycles, dive, water ski, scuba dive, or parachute jump while you are pregnant. Avoid things that can make your body too hot and may be harmful to your baby, such as a hot tub or sauna. Or talk with your doctor before doing anything that raises your body temperature. Your doctor can tell you if it's safe. Do not take any over-the-counter or herbal medicines or supplements without talking to your doctor or pharmacist first.  When should you call for help? Call 911  anytime you think you may need emergency care. For example, call if:    You passed out (lost consciousness). You have a seizure. You have severe vaginal bleeding. You have severe pain in your belly or pelvis. You have had fluid gushing or leaking from your vagina and you know or think the umbilical cord is bulging into your vagina. If this happens, immediately get down on your knees so your rear end (buttocks) is higher than your head. This will decrease the pressure on the cord until help arrives.    Call your doctor now or seek immediate medical care if:    You have signs of preeclampsia, such as:  Sudden swelling of your face, hands, or feet. New vision problems (such as dimness, blurring, or seeing spots). A severe headache. You have any vaginal bleeding. You have belly pain or cramping. You have a fever. You have had regular contractions (with or without pain) for an hour. This means that you have 8 or more within 1 hour or 4 or more in 20 minutes after you change your position and drink fluids. You have a sudden release of fluid from your vagina. You have low back pain or pelvic pressure that does not go away. You notice that your baby has stopped moving or is moving much less than normal.   Watch closely for changes in your health, and be sure to contact your doctor if you have any problems. Where can you learn more? Go to https://chmelissa.Respirics. org and sign in to your Pley account. Enter [id number] in the Everloop box to learn more about \"Pregnancy Precautions: Care Instructions. \"     If you do not have an account, please click on the \"Sign Up Now\" link. Current as of: 2022               Content Version: 13.4  © 5373-7125 [employer]. Care instructions adapted under license by [employer]. If you have questions about a medical condition or this instruction, always ask your healthcare professional. [place] [address] any warranty or liability for your use of this information. Patient Education        Week 44 of Your Pregnancy: Care Instructions  Shepherd babies can look different than what you see in pictures or movies. Their heads can be a strange shape right after birth. And they may have swollen eyes and red marks on their faces. You can still get pregnant even if you are breastfeeding. If you don't want to get pregnant, use birth control. Prepare to breastfeed. Continue to eat healthy foods.   Keep taking your prenatal vitamins during breastfeeding if your doctor recommends it. Talk to your doctor before taking any medicines or supplements. Choose the right birth control for you. Intrauterine devices (IUDs) are placed in the uterus. If you plan ahead, the IUD can be placed right after giving birth. They work for years. Hormonal implants are placed under the skin of the arm. They also work for years. Depo-Provera is a shot. You get it every 3 months. Birth control pills can be used. They're taken every day. Tubal ligation (tying your tubes) and vasectomy are surgeries. They're permanent. Diaphragms, spermicide, and condoms must be used each time you have sex. If you used a diaphragm before, you should get refitted after the baby is born. Follow-up care is a key part of your treatment and safety. Be sure to make and go to all appointments, and call your doctor if you are having problems. It's also a good idea to know your test results and keep a list of the medicines you take. Where can you learn more? Go to https://Favoe.Wiren Board. org and sign in to your Given Goods account. Enter E482 in the [facility] box to learn more about \"Week 39 of Your Pregnancy: Care Instructions. \"     If you do not have an account, please click on the \"Sign Up Now\" link. Current as of: February 23, 2022               Content Version: 13.4  © 2006-2022 Healthwise, Incorporated. Care instructions adapted under license by [facility] ([facility]). If you have questions about a medical condition or this instruction, always ask your healthcare professional. [place] [address] any warranty or liability for your use of this information. Patient Education        Early Stage of Labor at Home: Care Instructions  Overview     If you came to the hospital while in early labor, your doctor may ask you to labor at home until your contractions are stronger. Many women stay at home during early labor.  This is often the longest part of the birthing process. It may last up to 2 to 3 days. Contractions are mild to moderate and shorter (about 30 to 45 seconds). You can usually keep talking during them. Contractions may also be irregular, about 5 to 20 minutes apart. They may even stop for a while. It helps to stay as relaxed as you can during this time. You can spend some or all of your early labor at home or anywhere else you may be comfortable. If you live far from the hospital or birthing center, you may want to think about going somewhere nearby so you can get back to the hospital quickly. For some women, there may be benefits to staying home during early labor, such as avoiding medicines or procedures. As labor progresses, you'll shift from early labor to active labor. During this time, contractions get more intense. They occur more often, about every 2 to 3 minutes. They also last longer, about 50 to 70 seconds. You will feel them even when you change positions and walk or move around. It may be hard to tell if you are in active labor. If you aren't sure, call your doctor or midwife. As your labor progresses, check in with your doctor or midwife about when to come back to the hospital or birthing center. You may have special instructions if your water broke or you tested positive for group B strep. Follow-up care is a key part of your treatment and safety. Be sure to make and go to all appointments, and call your doctor if you are having problems. It's also a good idea to know your test results and keep a list of the medicines you take. How can you care for yourself at home? Get support. Having a support person with you from early labor until after childbirth can have a positive effect on childbirth. Find distractions. During early labor, you can walk, play cards, watch TV, or listen to music to help take your mind off your contractions. Ask your partner, labor , or  for a massage.  Shoulder and low back massage during contractions may ease your pain. Strong massage of the back muscles (counterpressure) during contractions may help relieve the pain of back labor. Tell your labor  exactly where to push and how hard to push. Use imagery. This means using your imagination to decrease your pain. For instance, to help manage pain, picture your contractions as waves rolling over you. Picture a peaceful place, such as a beach or mountain stream, to help you relax between contractions. Change positions during labor. Walking, kneeling, or sitting on a big rubber ball (birth ball) are good options. Use focused breathing techniques. Breathing in a rhythm can distract you from pain. Take a warm shower or bath. Warm water may ease pain and stress. When should you call for help? Call 911  anytime you think you may need emergency care. For example, call if:    You passed out (lost consciousness). You have a seizure. You have severe vaginal bleeding. You have severe pain in your belly or pelvis that doesn't get better between contractions. You have had fluid gushing or leaking from your vagina and you know or think the umbilical cord is bulging into your vagina. If this happens, immediately get down on your knees so your rear end (buttocks) is higher than your head. This will decrease the pressure on the cord until help arrives. Call your doctor now or seek immediate medical care if:    You have new or worse signs of preeclampsia, such as:  Sudden swelling of your face, hands, or feet. New vision problems (such as dimness, blurring, or seeing spots). A severe headache. You have any vaginal bleeding. You have belly pain or cramping. You have a fever. You have had regular contractions (with or without pain) for an hour. This means that you have 8 or more within 1 hour or 4 or more in 20 minutes after you change your position and drink fluids. You have a sudden release of fluid from your vagina. You have low back pain or pelvic pressure that does not go away. You notice that your baby has stopped moving or is moving much less than normal.   Watch closely for changes in your health, and be sure to contact your doctor if you have any problems. Where can you learn more? Go to https://chmelissa.healthCureatr. org and sign in to your Fangdd account. Enter N499 in the [employer] box to learn more about \"Early Stage of Labor at Home: Care Instructions. \"     If you do not have an account, please click on the \"Sign Up Now\" link. Current as of: 2022               Content Version: 13.4   [employer]. Care instructions adapted under license by [facility] ([facility]). If you have questions about a medical condition or this instruction, always ask your healthcare professional. [place] [address] any warranty or liability for your use of this information. Patient Education        Weeks 40 to 39 of Your Pregnancy: Care Instructions  By week 36, you've reached your due date. Your baby could be coming any day. Most babies born after their due dates are healthy. But you may have tests to make sure everything is okay. If you feel stressed, you could try a meditation exercise, such as guided imagery. It may help you relax. If you are past 41 weeks, your doctor may measure the amount of fluid that surrounds your baby. They may also test your baby's movement and heart rate. If you don't start labor on your own by 41 or 42 weeks, your doctor may want to start (induce) labor. If there are other concerns, your doctor may talk to you about a . To start (induce) your labor, your doctor may do any of these things. Place a narrow tube with a small balloon on the end (balloon catheter) into your cervix. The doctor inflates the balloon. This helps your cervix open (dilate).      Sweep the membranes (separate the lining of the amniotic sac from the uterus). This helps the uterus make a chemical that can start contractions. \"Break your water\" (rupture the amniotic sac). This may be done if your cervix has started to open. Use medicines. They may be used to soften the cervix or start contractions. How to do guided imagery  Close your eyes, and take a few deep breaths. Picture a peaceful setting, such as a beach or a meadow. Add a winding path. Follow the path until you feel more and more relaxed. Take a few minutes to breathe slowly and feel the calm. When you are ready, slowly take yourself back to the present. Count to 3, and open your eyes. Follow-up care is a key part of your treatment and safety. Be sure to make and go to all appointments, and call your doctor if you are having problems. It's also a good idea to know your test results and keep a list of the medicines you take. Where can you learn more? Go to [email or web address] [email or web address] org and sign in to your [employer] account. Enter J863 in the World[employer] box to learn more about \"Weeks 40 to 41 of Your Pregnancy: Care Instructions. \"     If you do not have an account, please click on the \"Sign Up Now\" link. Current as of: February 23, 2022               Content Version: 13.4  © 7568-3160 Healthwise, Incorporated. Care instructions adapted under license by [facility] ([facility]). If you have questions about a medical condition or this instruction, always ask your healthcare professional. [place] [address] any warranty or liability for your use of this information.

## 2022-10-24 NOTE — PROGRESS NOTES
CNM Prenatal Office Note  Subjective:  Sita Armstrong is here for a return obstetrical visit. Today she is 39w3d weeks EGA. She is doing well, taking her PNV as directed and has no unusual complaints. She does not have vaginal bleeding, n/v, syncope, or headaches. Pt does feel fetal movement regularly. Objective: Mother's Prenatal Vitals  BP: 110/70  Weight: 165 lb (74.8 kg)  Patient Position: Sitting  Prenatal Fetal Information  Fundal Height (cm): 39 cm  Fetal HR:   Movement: Present  Cervical Exam  Dilation (cm): 3  Effacement: 80  Station: -2  Station (Labor Curve Graph): 7  Presentation: Vertex  Dil/Eff/Sta  Dilation (cm): 3  Effacement: 80  Station: -2  Pt is A&Ox3, in no acute distress. Normocephalic, atraumatic. PERRL. Resp even and non-labored. Skin pink, warm & dry. Gravid abdomen. VELEZ's well. Gait steady. Assessment:    IUP at 39w3d wks      Diagnosis Orders   1. 39 weeks gestation of pregnancy        2. AMA (advanced maternal age) multigravida 33+, third trimester        3. Previous  section complicating pregnancy        4. Encounter for supervision of other normal pregnancy in third trimester          Plan:   Pt counseled on balanced nutrition, adequate fluid intake, taking PNV daily, and exercise along with labor precautions, GHTN precautions and Kick count   Continue with routine prenatal care.   surveillance indicated for AMA status, NST reactive, 1 deceleration noted - sent to LDR for extended monitoring and IOL @ term   F/u in 2 weeks for PP visit    MEDICATIONS:  No orders of the defined types were placed in this encounter. ORDERS:  No orders of the defined types were placed in this encounter. More than 50% of this 20 min visit was education and counseling.

## 2022-10-24 NOTE — FLOWSHEET NOTE
Pt sent from office after having a decel for monitoring and IOL. Pt reports positive fetal movement and denies LOF, vaginal bleeding or regular uc's. Abd soft and nontender.

## 2022-10-25 ENCOUNTER — ANESTHESIA (OUTPATIENT)
Dept: LABOR AND DELIVERY | Age: 37
End: 2022-10-25
Payer: COMMERCIAL

## 2022-10-25 ENCOUNTER — ANESTHESIA EVENT (OUTPATIENT)
Dept: LABOR AND DELIVERY | Age: 37
End: 2022-10-25
Payer: COMMERCIAL

## 2022-10-25 PROCEDURE — 6360000002 HC RX W HCPCS: Performed by: ADVANCED PRACTICE MIDWIFE

## 2022-10-25 PROCEDURE — 6360000002 HC RX W HCPCS

## 2022-10-25 PROCEDURE — 7200000001 HC VAGINAL DELIVERY

## 2022-10-25 PROCEDURE — 3700000025 EPIDURAL BLOCK

## 2022-10-25 PROCEDURE — 2580000003 HC RX 258: Performed by: ADVANCED PRACTICE MIDWIFE

## 2022-10-25 PROCEDURE — 1220000000 HC SEMI PRIVATE OB R&B

## 2022-10-25 PROCEDURE — 2500000003 HC RX 250 WO HCPCS

## 2022-10-25 RX ORDER — LIDOCAINE HYDROCHLORIDE 10 MG/ML
INJECTION, SOLUTION EPIDURAL; INFILTRATION; INTRACAUDAL; PERINEURAL PRN
Status: DISCONTINUED | OUTPATIENT
Start: 2022-10-25 | End: 2022-10-25 | Stop reason: SDUPTHER

## 2022-10-25 RX ORDER — LIDOCAINE HYDROCHLORIDE AND EPINEPHRINE 15; 5 MG/ML; UG/ML
INJECTION, SOLUTION EPIDURAL PRN
Status: DISCONTINUED | OUTPATIENT
Start: 2022-10-25 | End: 2022-10-25 | Stop reason: SDUPTHER

## 2022-10-25 RX ORDER — EPHEDRINE SULFATE 50 MG/ML
INJECTION, SOLUTION INTRAVENOUS PRN
Status: DISCONTINUED | OUTPATIENT
Start: 2022-10-25 | End: 2022-10-25 | Stop reason: SDUPTHER

## 2022-10-25 RX ORDER — ROPIVACAINE HYDROCHLORIDE 2 MG/ML
INJECTION, SOLUTION EPIDURAL; INFILTRATION; PERINEURAL CONTINUOUS PRN
Status: DISCONTINUED | OUTPATIENT
Start: 2022-10-25 | End: 2022-10-25 | Stop reason: SDUPTHER

## 2022-10-25 RX ORDER — SODIUM CHLORIDE, SODIUM LACTATE, POTASSIUM CHLORIDE, AND CALCIUM CHLORIDE .6; .31; .03; .02 G/100ML; G/100ML; G/100ML; G/100ML
1000 INJECTION, SOLUTION INTRAVENOUS PRN
Status: DISCONTINUED | OUTPATIENT
Start: 2022-10-25 | End: 2022-10-26

## 2022-10-25 RX ORDER — SODIUM CHLORIDE 9 MG/ML
25 INJECTION, SOLUTION INTRAVENOUS PRN
Status: DISCONTINUED | OUTPATIENT
Start: 2022-10-25 | End: 2022-10-26

## 2022-10-25 RX ORDER — ROPIVACAINE HYDROCHLORIDE 2 MG/ML
INJECTION, SOLUTION EPIDURAL; INFILTRATION; PERINEURAL PRN
Status: DISCONTINUED | OUTPATIENT
Start: 2022-10-25 | End: 2022-10-25 | Stop reason: SDUPTHER

## 2022-10-25 RX ADMIN — EPHEDRINE SULFATE 10 MG: 50 INJECTION INTRAMUSCULAR; INTRAVENOUS; SUBCUTANEOUS at 13:29

## 2022-10-25 RX ADMIN — ROPIVACAINE HYDROCHLORIDE 4 ML: 2 INJECTION, SOLUTION EPIDURAL; INFILTRATION; PERINEURAL at 12:50

## 2022-10-25 RX ADMIN — Medication 87.3 MILLI-UNITS/MIN: at 19:43

## 2022-10-25 RX ADMIN — SODIUM CHLORIDE, SODIUM LACTATE, POTASSIUM CHLORIDE, AND CALCIUM CHLORIDE: 600; 310; 30; 20 INJECTION, SOLUTION INTRAVENOUS at 03:30

## 2022-10-25 RX ADMIN — PHENYLEPHRINE HYDROCHLORIDE 25 MCG: 10 INJECTION INTRAVENOUS at 13:31

## 2022-10-25 RX ADMIN — LIDOCAINE HYDROCHLORIDE AND EPINEPHRINE 3 ML: 15; 5 INJECTION, SOLUTION EPIDURAL at 12:45

## 2022-10-25 RX ADMIN — SODIUM CHLORIDE, SODIUM LACTATE, POTASSIUM CHLORIDE, AND CALCIUM CHLORIDE: 600; 310; 30; 20 INJECTION, SOLUTION INTRAVENOUS at 17:30

## 2022-10-25 RX ADMIN — SODIUM CHLORIDE, SODIUM LACTATE, POTASSIUM CHLORIDE, AND CALCIUM CHLORIDE: 600; 310; 30; 20 INJECTION, SOLUTION INTRAVENOUS at 11:37

## 2022-10-25 RX ADMIN — Medication 1 MILLI-UNITS/MIN: at 04:12

## 2022-10-25 RX ADMIN — LIDOCAINE HYDROCHLORIDE 3 ML: 10 INJECTION, SOLUTION EPIDURAL; INFILTRATION; INTRACAUDAL; PERINEURAL at 12:42

## 2022-10-25 RX ADMIN — ROPIVACAINE HYDROCHLORIDE 6 ML: 2 INJECTION, SOLUTION EPIDURAL; INFILTRATION; PERINEURAL at 12:54

## 2022-10-25 RX ADMIN — ROPIVACAINE HYDROCHLORIDE 10 ML/HR: 2 INJECTION, SOLUTION EPIDURAL; INFILTRATION; PERINEURAL at 12:54

## 2022-10-25 RX ADMIN — SODIUM CHLORIDE, POTASSIUM CHLORIDE, SODIUM LACTATE AND CALCIUM CHLORIDE 500 ML: 600; 310; 30; 20 INJECTION, SOLUTION INTRAVENOUS at 13:06

## 2022-10-25 RX ADMIN — PHENYLEPHRINE HYDROCHLORIDE 25 MCG: 10 INJECTION INTRAVENOUS at 13:26

## 2022-10-25 RX ADMIN — SODIUM CHLORIDE, SODIUM LACTATE, POTASSIUM CHLORIDE, AND CALCIUM CHLORIDE 1000 ML: 600; 310; 30; 20 INJECTION, SOLUTION INTRAVENOUS at 12:14

## 2022-10-25 RX ADMIN — PHENYLEPHRINE HYDROCHLORIDE 100 MCG: 10 INJECTION INTRAVENOUS at 13:22

## 2022-10-25 NOTE — ANESTHESIA PRE PROCEDURE
Department of Anesthesiology  Preprocedure Note       Name:  Bethanie Ramirez   Age:  30 y.o.  :  1985                                          MRN:  [id number]         Date:  10/25/2022      Surgeon: * No surgeons listed *    Procedure: * No procedures listed *    Medications prior to admission:   Prior to Admission medications    Medication Sig Start Date End Date Taking? Authorizing Provider   fluticasone (FLONASE) 50 MCG/ACT nasal spray 1 spray by Each Nostril route daily PRN  Patient not taking: No sig reported    Historical Provider, MD   loratadine (CLARITIN) 10 MG capsule Take 10 mg by mouth daily    Historical Provider, MD   CALCIUM PO Take by mouth    Historical Provider, MD   Prenatal MV-Min-Fe Fum-FA-DHA (PRENATAL 1 PO) Take by mouth    Historical Provider, MD       Current medications:    No current facility-administered medications for this visit. No current outpatient medications on file.      Facility-Administered Medications Ordered in Other Visits   Medication Dose Route Frequency Provider Last Rate Last Admin    oxytocin (PITOCIN) 30 units in 500 mL infusion  87.3 doreen-units/min IntraVENous Continuous PRN Jerrlyn Marine, APRN - CNM        And    oxytocin (PITOCIN) 10 unit bolus from the bag  10 Units IntraVENous PRN Jerrlyn Marine, APRN - CNM        oxytocin (PITOCIN) 30 units in 500 mL infusion  1-20 doreen-units/min IntraVENous Continuous Jerrlyn Marine, APRN - CNM 20 mL/hr at 10/25/22 1130 20 doreen-units/min at 10/25/22 1130    lactated ringers bolus  1,000 mL IntraVENous PRN Jerrlyn Marine, APRN - CNM   Stopped at 10/25/22 1305    oxytocin (PITOCIN) 10 unit bolus from the bag  10 Units IntraVENous PRN Jerrlyn Marine, APRN - CNM        butorphanol (STADOL) injection 1 mg  1 mg IntraVENous Q3H PRN Jerrlyn Marine, APRN - CNM        0.9 % sodium chloride infusion  25 mL IntraVENous PRN Jerrlyn Marine, APRN - CNM        lidocaine PF 1 % injection   IntraVENous PRN [facility] APRN - CRNA   3 mL at 10/25/22 1242    Lidocaine-EPINEPHrine 1.5 %-1:[id number]   Epidural PRN Berto Doyne, APRN - CRNA   3 mL at 10/25/22 1245    ropivacaine (NAROPIN) 0.2% injection 0.2%   Epidural PRN Berto Doyne, APRN - CRNA   6 mL at 10/25/22 1254    ropivacaine (NAROPIN) 0.2% injection 0.2%   Epidural Continuous PRN Berto Doyne, APRN - CRNA 8 mL/hr at 10/25/22 1322 8 mL/hr at 10/25/22 1322    ePHEDrine injection   IntraVENous PRN Berto Doyne, APRN - CRNA   10 mg at 10/25/22 1329    phenylephrine (KANDY-SYNEPHRINE) injection   IntraVENous PRN Berto Doyne, APRN - CRNA   25 mcg at 10/25/22 1331    lactated ringers infusion   IntraVENous Continuous Elba Pluck, APRN -  mL/hr at 10/25/22 1137 New Bag at 10/25/22 1137    lactated ringers bolus  500 mL IntraVENous PRN Elba Pluck, APRN - CNM   Stopped at 10/25/22 1407    Or    lactated ringers bolus  1,000 mL IntraVENous PRN Elba Pluck, APRN - CNM        ondansetron [facility]) injection 4 mg  4 mg IntraVENous Q6H PRN Elba Pluck, APRN - CNM        methylergonovine (METHERGINE) injection 200 mcg  200 mcg IntraMUSCular PRN Elba Pluck, APRN - CNM        carboprost (HEMABATE) injection 250 mcg  250 mcg IntraMUSCular PRN Elba Pluck, APRN - CNM        miSOPROStol (CYTOTEC) tablet 800 mcg  800 mcg Rectal PRN Elba Pluck, APRN - CNM        tranexamic acid (CYKLOKAPRON) 1,000 mg in sodium chloride 0.9 % 100 mL IVPB  1,000 mg IntraVENous Once PRN Elba Pluck, APRN - CNM        acetaminophen (TYLENOL) tablet 650 mg  650 mg Oral Q4H PRN Elba Pluck, APRN - CNM        benzocaine-menthol (DERMOPLAST) 20-0.5 % spray   Topical PRN Elba Pluck, APRN - CNM           Allergies:  No Known Allergies    Problem List:    Patient Active Problem List   Diagnosis Code    Biliary dyskinesia K82.8    39 weeks gestation of pregnancy Z3A.39       Past Medical History:        Diagnosis Date    Esophageal reflux 10/10/2017    Gallbladder disease     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     Patient tested positive about two weeks ago from today, 16    Postpartum depression     undiagnosed    Prolactin deficiency [facility])        Past Surgical History:        Procedure Laterality Date     SECTION N/A 2021     SECTION performed by J[facility]reras MD at [facility]    CHOLECYSTECTOMY, LAPAROSCOPIC N/A 10/13/2016    CHOLECYSTECTOMY LAPAROSCOPIC performed by Carlitos Hernandez MD at [facility] 69 TYMPANOSTOMY TUBE PLACEMENT         Social History:    Social History     Tobacco Use    Smoking status: Never    Smokeless tobacco: Never   Substance Use Topics    Alcohol use: No                                Counseling given: Not Answered      Vital Signs (Current): There were no vitals filed for this visit.                                            BP Readings from Last 3 Encounters:   10/25/22 (!) 99/55   10/24/22 110/70   10/18/22 100/60       NPO Status:                                                                                 BMI:   Wt Readings from Last 3 Encounters:   10/24/22 165 lb (74.8 kg)   10/18/22 162 lb (73.5 kg)   10/14/22 162 lb (73.5 kg)     There is no height or weight on file to calculate BMI.    CBC:   Lab Results   Component Value Date/Time    WBC 6.7 10/24/2022 03:32 PM    RBC 4.46 10/24/2022 03:32 PM    HGB 14.2 10/24/2022 03:32 PM    HCT 40.7 10/24/2022 03:32 PM    MCV 91.3 10/24/2022 03:32 PM    RDW 13.8 10/24/2022 03:32 PM     10/24/2022 03:32 PM       CMP:   Lab Results   Component Value Date/Time     2022 10:31 AM    K 3.8 2022 10:31 AM    K 4.1 2022 07:00 AM     2022 10:31 AM    CO2 23 2022 10:31 AM    BUN 7 2022 10:31 AM    CREATININE 0.5 2022 10:31 AM    GFRAA >59 2022 10:31 AM    LABGLOM >60 2022 10:31 AM    GLUCOSE 83 2022 10:31 AM    PROT 6.8 2022 10:31 AM    CALCIUM 9.1 2022 10:31 AM    BILITOT 0.4 06/13/2022 10:31 AM    ALKPHOS 56 06/13/2022 10:31 AM    AST 13 06/13/2022 10:31 AM    ALT 7 06/13/2022 10:31 AM       POC Tests: No results for input(s): POCGLU, POCNA, POCK, POCCL, POCBUN, POCHEMO, POCHCT in the last 72 hours. Coags: No results found for: PROTIME, INR, APTT    HCG (If Applicable):   Lab Results   Component Value Date    PREGTESTUR positive 02/22/2022        ABGs:   Lab Results   Component Value Date/Time    PHART 7.230 05/04/2021 08:29 AM    PO2ART 21.0 05/04/2021 08:29 AM    CYM8XLD 59.0 05/04/2021 08:29 AM    RQI0JTU 24.7 05/04/2021 08:29 AM    BEART -3.6 05/04/2021 08:29 AM    R1OJCERS 41.0 05/04/2021 08:29 AM        Type & Screen (If Applicable):  No results found for: LABABO, LABRH    Drug/Infectious Status (If Applicable):  No results found for: HIV, HEPCAB    COVID-19 Screening (If Applicable):   Lab Results   Component Value Date/Time    COVID19 Not Detected 05/19/2022 07:16 AM    COVID19 DETECTED 02/04/2022 09:19 AM           Anesthesia Evaluation  Patient summary reviewed and Nursing notes reviewed no history of anesthetic complications:   Airway: Mallampati: III  TM distance: >3 FB   Neck ROM: full  Mouth opening: > = 3 FB   Dental: normal exam         Pulmonary:Negative Pulmonary ROS and normal exam                               Cardiovascular:Negative CV ROS  Exercise tolerance: good (>4 METS),            Beta Blocker:  Not on Beta Blocker         Neuro/Psych:               GI/Hepatic/Renal:   (+) GERD:,           Endo/Other: Negative Endo/Other ROS             Pt had no PAT visit       Abdominal:             Vascular: negative vascular ROS. Other Findings:             Anesthesia Plan      epidural     ASA 2             Anesthetic plan and risks discussed with patient.                         DIANA Buenrostro - TREVOR   10/25/2022

## 2022-10-25 NOTE — ANESTHESIA PROCEDURE NOTES
Epidural Block    Patient location during procedure: OB  Start time: 10/25/2022 12:24 PM  End time: 10/25/2022 12:54 PM  Reason for block: labor epidural  Staffing  Resident/CRNA: DIANA Hernandez - CRNA  Epidural  Patient position: sitting  Prep: ChloraPrep  Patient monitoring: continuous pulse ox and frequent blood pressure checks  Approach: midline  Location: L1-2  Injection technique: DORON saline  Provider prep: mask and sterile gloves  Needle  Needle type: Tuohy   Needle gauge: 17 G  Epidural needle length (in): 4 inch.   Needle insertion depth: 4 cm  Catheter type: end hole  Catheter size: 19 G  Catheter at skin depth: 9 cm  Test dose: negativeCatheter Secured: tegaderm and tape  Assessment  Sensory level: T10  Hemodynamics: stable  Attempts: 1  Outcomes: uncomplicated and patient tolerated procedure well  Preanesthetic Checklist  Completed: patient identified, IV checked, site marked, risks and benefits discussed, surgical/procedural consents, equipment checked, pre-op evaluation, timeout performed, anesthesia consent given, oxygen available, monitors applied/VS acknowledged, fire risk safety assessment completed and verbalized and blood product R/B/A discussed and consented

## 2022-10-25 NOTE — DISCHARGE INSTRUCTIONS
NIPPLE CARE, NURSING MOMS:  Cleanse nipples with water only. Soap has a drying effect. 4. DO SELF-BREAST EXAMS MONTHLY. Urine Patterns:  Report inability to urinate, frequency, burning, or urgency to the doctor. Bowel Patterns:  1. You should have a normal stool by 2nd or 3rd day after birth. 2. Drinking fluids, walking, and eating roughage help promote regular stools. Hemorrhoid Care:  1. Take 20-minute sitz bath 3-4 times daily. 2. Use TUCKS. 3. Maintain adequate fluid intake. 4. Avoid prolonged sitting. Hygiene:  1. Apply regla pads from front to back. 2. Always clean from front to back. 3. Shower daily. Nutrition/Fluids:  1. A weight loss of 10-12 pounds after birth is normal.  2. It may take from 4 weeks to several months to reach your pre-pregnant state. 3. After weight loss stabilizes, weight loss should not exceed a pound per week. Rest/Sleep:  1. Rest as much as possible. 2. Your first week at home should consist of taking care of yourself and the baby only. Activity:  1. Limit the following activities for 6 weeks. Heavy lifting ,nothing heavier than the baby for first few weeks  No douching, tampons or intercourse for 6 weeks. Wearing constrictive garments around abdomen or legs. 2. May drive in 1-2 weeks or as directed by doctor. 3. Start past-partum exercises. 4. Increased discharge or pain may mean you need to decrease activities. Emotions: You may become depressed, let down or have mood swings and feel weepy for a couple of weeks. This is due to hormonal changes, fatigue, discomfort and over-stimulation, which is normal. Many people refer to this as the baby blues. This should resolve in a few days. If symptoms worsen contact your doctor. Sexuality:  1. You many have vaginal dryness for first few weeks. May use KY Jelly. 2. You may have difficulty feeling excitement because of fatigue due to recovery and demands of  care.   3. There may be a leakage of breast milk during sexual activity. (You may need to wear a bra.)  4. Vaginal tenderness-use the female superior or [id number] position  5. Resume intercourse-abstain from intercourse until episiotomy is healed and discharge has stopped (or as directed by your doctor). Contraception:  1. You can become pregnant while breast-feeding. 2. Use contraception as desired and as discussed with your doctor. Depression After Childbirth: Care Instructions  Overview     Many women get the \"baby blues\" during the first few days after childbirth. You may lose sleep, feel irritable, and cry easily. You may feel happy one minute and sad the next. Hormone changes are one cause of these emotional changes. Also, the demands of a new baby, along with visits from relatives or other family needs, can add to the stress. The \"baby blues\" often peak around thefourth day. Then they ease up in less than 2 weeks. If your moodiness or anxiety lasts for more than 2 weeks, or if you feel like life is not worth living, you may have postpartum depression. This is different for each person. Some mothers with serious depression may worry intensely about their infant's well-being. Others may feel distant from their child. Some mothers may even feel that they might harm their baby. Some may have signs ofparanoia, wondering if someone is watching them. Depression is not a sign of weakness. It's a medical condition that requires treatment. Medicine and counseling often work well to reduce depression. Talkto your doctor about taking antidepressant medicine while breastfeeding. Follow-up care is a key part of your treatment and safety. Be sure to make and go to all appointments, and call your doctor if you are having problems. It's also a good idea to know your test results and keep alist of the medicines you take. How do you know if you are depressed?   With all the changes in your life, you may not know if you are depressed. Pregnancy sometimes causes changes in how you feel that are similar to thesymptoms of depression. Symptoms of depression include:  Feeling sad or hopeless and losing interest in daily activities. These are the most common symptoms of depression. Sleeping too much or not enough. Feeling tired. You may feel as if you have no energy. Eating too much or too little. Writing or talking about death, such as writing suicide notes or talking about guns, knives, or pills. If you or someone you know talks about suicide, self-harm, or feeling hopeless, get help right away. Call the [facility] at 1-800-273-talk (5-214.981.4641) or text HOME to 497989 to access the [facility]. Consider saving these numbers in your phone. How can you care for yourself at home? Be safe with medicines. Take your medicines exactly as prescribed. Call your doctor if you think you are having a problem with your medicine. Eat a healthy diet so that you can keep up your energy. Get regular daily exercise, such as walks, to help improve your mood. Get as much sunlight as possible. Keep your shades and curtains open. Get outside as much as you can. Avoid using alcohol or other substances to feel better. Get as much rest and sleep as possible. Avoid doing too much. Being too tired can increase depression. Play stimulating music throughout your day and soothing music at night. Schedule outings and visits with friends and family. Ask them to call you regularly, so that you don't feel alone. Ask for help with preparing food and other daily tasks. Family and friends are often happy to help with a . Be honest with yourself and those who care about you. Tell them about your struggle. Join a support group of new parents. No one can better understand the challenges of caring for a  than other new parents.

## 2022-10-25 NOTE — H&P
[facility]    [facility] History and Physical    Provider: DIANA Dowell CNM  Date: 10/25/2022            8:36 AM    Patient Name: Kyle Chou  Patient : 1985  MRN: [id number]   Room/Bed: [facility]8/0218-    SUBJECTIVE:    CHIEF COMPLAINT:  contractions, fetal deceleration on NST  No chief complaint on file. HISTORY OF PRESENT ILLNESS:      Kyle Chou a 30 y.o. female at 43w3d presents with a chief complaint as above and is being admitted for induction. She was seen today in office and sent for extended monitoring following isolated deceleration on NST. She desires IOL. Will plan pitocin IOL and TOLAC tomorrow. Her pregnancy has been complicated by prior c/s (LTUI for prolapsed cord) and AMA status. Contractions: yes  Rupture of membranes: no  Vaginal bleeding: no    REVIEW OF SYSTEMS:  A comprehensive review of systems was negative except for what was noted in the HPI. OBJECTIVE:     Estimated Due Date:   Estimated Date of Delivery: 10/28/22   Patient's last menstrual period was 2022.       PREGNANCY RISK FACTORS:       Patient Active Problem List   Diagnosis    Biliary dyskinesia    39 weeks gestation of pregnancy        Steroids:  no    PAST OB HISTORY:  OB History    Para Term  AB Living   3 2 2 0 0 2   SAB IAB Ectopic Molar Multiple Live Births   0 0 0 0 0 2      # Outcome Date GA Lbr Roverto/2nd Weight Sex Delivery Anes PTL Lv   3 Current            2 Term 21 41w6d  8 lb 1.1 oz (3.66 kg) F CS-LTranv   JESSICA      Name: Earleen Apgar: 9  Apgar5: 9   1 Term 18 40w2d 07:12 / 00:50 8 lb 13.5 oz (4.01 kg) F Vag-Spont EPI N JESSICA      Name: Earleen Apgar: 9  Apgar5: 10          Past Medical History:        Diagnosis Date    Esophageal reflux 10/10/2017    Gallbladder disease     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     Patient tested positive about two weeks ago from today, 16    Postpartum depression     undiagnosed Prolactin deficiency [facility])        Past Surgical History:        Procedure Laterality Date     SECTION N/A 2021     SECTION performed by Malinda Daugherty MD at [facility] L&D OR    CHOLECYSTECTOMY, LAPAROSCOPIC N/A 10/13/2016    CHOLECYSTECTOMY LAPAROSCOPIC performed by Adam Dupont MD at [address] [place] [address]         Allergies:    Patient has no known allergies.     Social History:    Social History     Socioeconomic History    Marital status:      Spouse name: Not on file    Number of children: Not on file    Years of education: Not on file    Highest education level: Not on file   Occupational History    Not on file   Tobacco Use    Smoking status: Never    Smokeless tobacco: Never   Vaping Use    Vaping Use: Never used   Substance and Sexual Activity    Alcohol use: No    Drug use: No    Sexual activity: Yes     Partners: Male   Other Topics Concern    Not on file   Social History Narrative    Not on file     Social Determinants of Health     Financial Resource Strain: Low Risk     Difficulty of Paying Living Expenses: Not hard at all   Food Insecurity: No Food Insecurity    Worried About Running Out of Food in the Last Year: Never true    Ran Out of Food in the Last Year: Never true   Transportation Needs: Not on file   Physical Activity: Not on file   Stress: Not on file   Social Connections: Not on file   Intimate Partner Violence: Not on file   Housing Stability: Not on file       Family History:       Problem Relation Age of Onset    Hypertension Mother     Diabetes Father     Hypertension Father     Alzheimer's Disease Maternal Grandmother     Diabetes Maternal Grandmother     Heart Disease Maternal Grandfather        Medications Prior to Admission:  Medications Prior to Admission: fluticasone (FLONASE) 50 MCG/ACT nasal spray, 1 spray by Each Nostril route daily PRN (Patient not taking: No sig reported)  loratadine (CLARITIN) 10 MG capsule, Take 10 mg by mouth daily  CALCIUM PO, Take by mouth  Prenatal MV-Min-Fe Fum-FA-DHA (PRENATAL 1 PO), Take by mouth    PHYSICAL EXAM:     Vitals:    10/25/22 0800   BP: 96/67   Pulse: 78   Resp:    Temp:        General appearance:  awake, alert, cooperative, no apparent distress, and appears stated age  Skin:  Warm, dry, no rashes or erythema  Neurologic:  Awake, alert, oriented to name, place and time. Cranial nerves II-XII are grossly intact. Motor is 5 out of 5 bilaterally.   Lungs:  No increased work of breathing, good air exchange, clear to auscultation bilaterally, no crackles or wheezing  Heart:  Normal apical impulse, regular rate and rhythm, normal S1 and S2, no S3 or S4, and no murmur noted  Breast:  Deferred   Abdomen: Gravid, Non-Tender  Extremities:  no calf tenderness, non edematous, DTRs: normal    Pelvic Exam:  FETALPRESENTATION:vertex  DILATION:  3 cm  EFFACEMENT:   80%  STATION:  -2 cm  CONSISTENCY:  soft  POSITION:  anterior      MEMBRANES:  Intact                                FETAL HEART RATE:    Baseline: 140      Variability: moderate      Accelerations: present      Decelerations: absent      FHR: Category: 1          CONTRACTIONS:   Frequency: 3-4 min  Duration: mild  Intensity: 60-80 sec  Resting tone: palpates soft between contractions     Lab Results:   Blood Type/Rh:    ABO/Rh   Date Value Ref Range Status   10/24/2022 O POS  Final     Antibody Screen:    Antibody Screen   Date Value Ref Range Status   10/24/2022 NEG  Final     Hemoglobin:   Hemoglobin   Date Value Ref Range Status   10/24/2022 14.2 12.0 - 16.0 g/dL Final     Hematocrit:   Hematocrit   Date Value Ref Range Status   10/24/2022 40.7 37.0 - 47.0 % Final     Platelet Count:   Platelets   Date Value Ref Range Status   10/24/2022 174 130 - 400 K/uL Final     Hepatitis B Surface Antigen: neg  Group B Strep:  neg  RPR: neg      ASSESSMENT/PLAN:  Elma Velázquez is a 30 y.o. female   At 43w3d    Other:   Admit to LDR with routine order set  TOLAC planned, Dr. Elliot Burton attending and readily available  Labor support prn  Encouraged epidural placement  Pain management and epidural when active  GBS prophylaxis per policy  Anticipate vaginal delivery    The R/B/A for TOLAC and  were thoroughly discussed with the patient and her family. We discussed risks including but not limited to uterine rupture, which is the most serious risk associated with . Uterine-rupture risk may be as much as 10 times greater in a trial of labor than with elective repeat  section. Thought the published rate of uterine rupture is approximately 0.8%, this event may be catastrophic for mother and baby resulting in maternal death and/or fetal demise. We also discussed the importance of careful patient selection including clinically adequate pelvis, 1 or 2 low transverse  sections for non-labor-related indications (HSV, breech presentation, etc.), no prior rupture, immediate availability of  MD, anesthesia, and OR personnel. After discussion and assessment, no contraindications were noted. I encouraged spontaneous onset of labor and discouraged induction of labor. Informed consent was obtained. Risks, benefits, alternatives and possible complications have been discussed in detail with the patient. Admission, and post admission procedures and expectations were discussed in detail. All questions were answered.     DIANA Weller CNM

## 2022-10-26 LAB
HCT VFR BLD CALC: 42.7 % (ref 37–47)
HEMOGLOBIN: 14 G/DL (ref 12–16)
MCH RBC QN AUTO: 30.8 PG (ref 27–31)
MCHC RBC AUTO-ENTMCNC: 32.8 G/DL (ref 33–37)
MCV RBC AUTO: 94.1 FL (ref 81–99)
PDW BLD-RTO: 14.1 % (ref 11.5–14.5)
PLATELET # BLD: 180 K/UL (ref 130–400)
PMV BLD AUTO: 9.1 FL (ref 9.4–12.3)
RBC # BLD: 4.54 M/UL (ref 4.2–5.4)
WBC # BLD: 13.8 K/UL (ref 4.8–10.8)

## 2022-10-26 PROCEDURE — 1220000000 HC SEMI PRIVATE OB R&B

## 2022-10-26 PROCEDURE — 10907ZC DRAINAGE OF AMNIOTIC FLUID, THERAPEUTIC FROM PRODUCTS OF CONCEPTION, VIA NATURAL OR ARTIFICIAL OPENING: ICD-10-PCS | Performed by: ADVANCED PRACTICE MIDWIFE

## 2022-10-26 PROCEDURE — 6370000000 HC RX 637 (ALT 250 FOR IP): Performed by: ADVANCED PRACTICE MIDWIFE

## 2022-10-26 PROCEDURE — 85027 COMPLETE CBC AUTOMATED: CPT

## 2022-10-26 PROCEDURE — 36415 COLL VENOUS BLD VENIPUNCTURE: CPT

## 2022-10-26 PROCEDURE — 59400 OBSTETRICAL CARE: CPT | Performed by: ADVANCED PRACTICE MIDWIFE

## 2022-10-26 RX ORDER — SODIUM CHLORIDE 9 MG/ML
INJECTION, SOLUTION INTRAVENOUS PRN
Status: DISCONTINUED | OUTPATIENT
Start: 2022-10-26 | End: 2022-10-27 | Stop reason: HOSPADM

## 2022-10-26 RX ORDER — IBUPROFEN 400 MG/1
800 TABLET ORAL EVERY 8 HOURS PRN
Status: DISCONTINUED | OUTPATIENT
Start: 2022-10-26 | End: 2022-10-27 | Stop reason: HOSPADM

## 2022-10-26 RX ORDER — SODIUM CHLORIDE 0.9 % (FLUSH) 0.9 %
5-40 SYRINGE (ML) INJECTION PRN
Status: DISCONTINUED | OUTPATIENT
Start: 2022-10-26 | End: 2022-10-26

## 2022-10-26 RX ORDER — SODIUM CHLORIDE 0.9 % (FLUSH) 0.9 %
5-40 SYRINGE (ML) INJECTION EVERY 12 HOURS SCHEDULED
Status: DISCONTINUED | OUTPATIENT
Start: 2022-10-26 | End: 2022-10-27 | Stop reason: HOSPADM

## 2022-10-26 RX ORDER — ACETAMINOPHEN 500 MG
1000 TABLET ORAL EVERY 8 HOURS PRN
Status: DISCONTINUED | OUTPATIENT
Start: 2022-10-26 | End: 2022-10-27 | Stop reason: HOSPADM

## 2022-10-26 RX ORDER — SODIUM CHLORIDE, SODIUM LACTATE, POTASSIUM CHLORIDE, CALCIUM CHLORIDE 600; 310; 30; 20 MG/100ML; MG/100ML; MG/100ML; MG/100ML
INJECTION, SOLUTION INTRAVENOUS CONTINUOUS
Status: DISCONTINUED | OUTPATIENT
Start: 2022-10-26 | End: 2022-10-26

## 2022-10-26 RX ORDER — DOCUSATE SODIUM 100 MG/1
100 CAPSULE, LIQUID FILLED ORAL 2 TIMES DAILY
Status: DISCONTINUED | OUTPATIENT
Start: 2022-10-26 | End: 2022-10-27 | Stop reason: HOSPADM

## 2022-10-26 RX ADMIN — IBUPROFEN 800 MG: 400 TABLET, FILM COATED ORAL at 04:09

## 2022-10-26 RX ADMIN — IBUPROFEN 800 MG: 400 TABLET, FILM COATED ORAL at 13:26

## 2022-10-26 RX ADMIN — DOCUSATE SODIUM 100 MG: 100 CAPSULE, LIQUID FILLED ORAL at 08:19

## 2022-10-26 ASSESSMENT — PAIN DESCRIPTION - DESCRIPTORS
DESCRIPTORS: CRAMPING
DESCRIPTORS: CRAMPING

## 2022-10-26 ASSESSMENT — PAIN SCALES - GENERAL
PAINLEVEL_OUTOF10: 6
PAINLEVEL_OUTOF10: 5

## 2022-10-26 ASSESSMENT — PAIN DESCRIPTION - LOCATION
LOCATION: ABDOMEN
LOCATION: ABDOMEN

## 2022-10-26 ASSESSMENT — PAIN - FUNCTIONAL ASSESSMENT: PAIN_FUNCTIONAL_ASSESSMENT: ACTIVITIES ARE NOT PREVENTED

## 2022-10-26 NOTE — ANESTHESIA POSTPROCEDURE EVALUATION
Department of Anesthesiology  Postprocedure Note    Patient: Skyla Schwartz  MRN: [id number]  YOB: 1985  Date of evaluation: 10/25/2022      Procedure Summary     Date: 10/25/22 Room / Location:     Anesthesia Start: 1224 Anesthesia Stop: 1937    Procedure: Labor Analgesia Diagnosis:     Scheduled Providers:  Responsible Provider: DIANA Saxena CRNA    Anesthesia Type: epidural ASA Status: 2          Anesthesia Type: No value filed.     Schuyler Phase I:      Schuyler Phase II:        Anesthesia Post Evaluation    Patient location during evaluation: bedside  Patient participation: complete - patient participated  Level of consciousness: awake  Pain score: 0  Airway patency: patent  Nausea & Vomiting: no vomiting and no nausea  Complications: no  Cardiovascular status: hemodynamically stable  Respiratory status: acceptable, room air and spontaneous ventilation  Hydration status: stable

## 2022-10-26 NOTE — FLOWSHEET NOTE
Assist patient to restroom, regla care complete, fresh pads and mesh panties applied. Pt reports feeling lightheaded after movement to bathroom, recovers easily with rest. Transferred to room 211 via use of papito Steady. VS stable, oriented to new room, time allowed for questions, none voiced will monitor.

## 2022-10-26 NOTE — PROGRESS NOTES
[facility]) OB/GYN  Progress Note    Subjective:     Postpartum Day 1: Vaginal Delivery    Patient is a [id number] The patient feels well. The patient denies emotional concerns. Pain is well controlled with current medications. The baby iswell. Baby is feeding via breast. Urinary output is adequate. The patient is ambulating well. The patient is tolerating a normal diet. Flatus has been passed. Objective:      Patient Vitals for the past 8 hrs:   BP Temp Temp src Pulse SpO2   10/26/22 0548 102/67 98 °F (36.7 °C) Temporal 61 100 %     General:    alert, appears stated age, and cooperative   Bowel Sounds:  active   Lochia:  appropriate   Uterine Fundus:   firm   Episiotomy/lac:  healing well, no significant drainage, no dehiscence, no significant erythema   DVT Evaluation:  No evidence of DVT seen on physical exam.     Lab Results   Component Value Date    WBC 13.8 (H) 10/26/2022    HGB 14.0 10/26/2022    HCT 42.7 10/26/2022    MCV 94.1 10/26/2022     10/26/2022     Assessment:     Status post vaginal delivery. doing well post vaginal    Plan:     Continue current care. Over 50% of the total visit time of 20 minutes was spent on counseling and/or coordination of care of post partum care, feeding instructions, importance of ambulation, and wound care instructions. All questions were answered and the patient voiced understanding.

## 2022-10-26 NOTE — L&D DELIVERY NOTE
Mother's Information      Labor Events     Labor?: No  Cervical Ripening:   Now               Lori Childers Girl Shabbir Apodaca [[id number]]      Labor Events     Labor?: No   Steroids?: None  Cervical Ripening Date/Time:     Antibiotics Received during Labor?: No  Rupture Date/Time: 10/25/22 13:53:00   Rupture Type: AROM  Fluid Color: Clear  Fluid Odor: None  Fluid Volume: Moderate  Induction: Oxytocin  Augmentation: AROM  Labor Complications: None       Anesthesia    Method: Epidural       Start Pushing      Labor onset date/time: 10/25/22 13:53:00 CDT Now     Dilation complete date/time: 10/25/22 19:22:00 Now     Start pushing date/time: 10/25/2022 19:23:00   Decision date/time (emergent ):           Labor Length    1st stage: 5h 29m  2nd stage: 0h 15m  3rd stage: 0h 04m       Delivery ()      Delivery Date/Time:  10/25/22 19:37:00   Delivery Type: Vaginal, Spontaneous    Details:             Presentation    Presentation: Vertex  Position: Left  _: Occiput  _: Anterior       Shoulder Dystocia    Shoulder Dystocia Present?: No  Add Second Maneuver  Add Third Maneuver  Add Fourth Maneuver  Add Fifth Maneuver  Add Sixth Maneuver  Add Seventh Maneuver  Add Eighth Maneuver  Add Ninth Maneuver       Assisted Delivery Details    Forceps Attempted?: No  Vacuum Extractor Attempted?: No       Document Additional Attempt         Document Additional Attempt                 Cord    Vessels: 3 Vessels  Complications: None  Delayed Cord Clamping?: Yes  Cord Clamped Date/Time: 10/25/2022 19:40:00  Cord Blood Disposition: Lab  Gases Sent?: No       Placenta    Date/Time: 10/25/2022 19:41:00  Removal: Spontaneous  Appearance: Intact  Disposition: Discarded       Lacerations    Episiotomy: None  Perineal Lacerations: None  Other Lacerations: no non-perineal laceration  Number of Repair Packets: 0       Vaginal Counts    Initial Count Personnel: ORIN PEÑA RN  Initial Count Verified By: ST ANYA    Sponges [employer]   Initial Counts Correct Correct Correct   Final Counts Correct Correct Correct   Final Count Personnel: ST FLAKO  Final Count Verified By: ORIN PEÑA RN  Accurate Final Count?: Yes  If the count is incorrect due to Intentionally Retained Foreign Object (IRFO) add the IRFO LDA in Lines/Drains. Add LDA: Link to [facility]       Blood Loss  Mother: Terrance Law" #[id number]     Start of Mother's Information      Delivery Blood Loss  10/25/22 1353 - 10/26/22 1227      Quantitative Blood Loss (mL) Hospital Encounter 150 grams    Total  150 mL               End of Mother's Information  Mother: Terrance Law" #[id number]                Delivery Providers    Delivering clinician: DIANA Rasmussen - DEB     Provider Role     Obstetrician    Eleanor Thapa RN Primary Nurse    Karly Taylor RN Primary  Nurse     NICU Nurse     Neonatologist     Anesthesiologist    Charisma Alejandro, APRN - CRNA Nurse Anesthetist     Nurse Practitioner    DIANA Rasmussen - DEB Midwife    Karly Taylor RN Nursery Nurse    Dejan Carrillo RCP Respiratory Therapist (Night)               Assessment    Living Status: Living  Delivery Location Comment:      Apgar Scoring Key:    0 1 2    Skin Color: Blue or pale Acrocyanotic Completely pink    Heart Rate: Absent <100 bpm >100 bpm    Reflex Irritability: No response Grimace Cry or active withdrawal    Muscle Tone: Limp Some flexion Active motion    Respiratory Effort: Absent Weak cry; hypoventilation Good, crying                      Skin Color:   Heart Rate:   Reflex Irritability:   Muscle Tone:   Respiratory Effort:    Total:            1 Minute:    1    2    2    2    2    9        Apgar 1 total from OB History    5 Minute:    1    2    2    2    2    9        Apgar 5 total from OB History    10 Minute:              15 Minute:              20 Minute:                        Apgars Assigned By: JONNY SLATER Resuscitation    Method: Bulb Suction, Stimulation              Measurements      Birth Weight: 3600 g Birth Length: 0.546 m     Head Circumference: 0.35 m               Title      Skin to Skin Initiation Date/Time: 10/25/22 19:41:00 CDT     Skin to Skin With: Mother     Skin to Skin End Date/Time:

## 2022-10-27 VITALS
HEART RATE: 65 BPM | RESPIRATION RATE: 18 BRPM | OXYGEN SATURATION: 100 % | TEMPERATURE: 97.2 F | DIASTOLIC BLOOD PRESSURE: 80 MMHG | SYSTOLIC BLOOD PRESSURE: 108 MMHG

## 2022-10-27 LAB — RPR: NORMAL

## 2022-10-27 PROCEDURE — 99024 POSTOP FOLLOW-UP VISIT: CPT | Performed by: NURSE PRACTITIONER

## 2022-10-27 RX ORDER — PSEUDOEPHEDRINE HCL 30 MG
100 TABLET ORAL 2 TIMES DAILY
Qty: 30 CAPSULE | Refills: 0 | Status: SHIPPED | OUTPATIENT
Start: 2022-10-27 | End: 2022-11-30

## 2022-10-27 RX ORDER — IBUPROFEN 800 MG/1
800 TABLET ORAL EVERY 8 HOURS PRN
Qty: 30 TABLET | Refills: 0 | Status: SHIPPED | OUTPATIENT
Start: 2022-10-27 | End: 2022-11-30

## 2022-10-27 NOTE — DISCHARGE SUMMARY
Subjective:     Postpartum Day 2: Vaginal Delivery    Patient is a [id number] The patient feels well. The patient denies emotional concerns. Pain is well controlled with current medications. The baby iswell. Baby is feeding via breast. Urinary output is adequate. The patient is ambulating well. The patient is tolerating a normal diet. Flatus has been passed. Objective:      Patient Vitals for the past 8 hrs:   BP Temp Temp src Pulse Resp SpO2   10/27/22 0550 108/80 97.2 °F (36.2 °C) Temporal 65 18 100 %     General:    alert, appears stated age, and cooperative   Bowel Sounds:  active   Lochia:  appropriate   Uterine Fundus:   firm   Episiotomy:  healing well, no significant drainage, no dehiscence, no significant erythema   DVT Evaluation:  No evidence of DVT seen on physical exam.     Lab Results   Component Value Date    WBC 13.8 (H) 10/26/2022    HGB 14.0 10/26/2022    HCT 42.7 10/26/2022    MCV 94.1 10/26/2022     10/26/2022     Assessment:     Status post vaginal delivery. doing well post vaginal    Plan:     Discharge home with standard precautions and return to clinic in 6 weeks.

## 2022-11-02 ENCOUNTER — TELEPHONE (OUTPATIENT)
Dept: OBGYN CLINIC | Age: 37
End: 2022-11-02

## 2022-11-02 NOTE — TELEPHONE ENCOUNTER
Patient called with concern of lump under arm. She delivered last week, has PP follow up next week. She was worried about a clogged duct and has been putting heat on the area. She did get covid vaccine on 10/21 and the spot has no pain, tenderness or redness. I told her it sounded like prob from vaccine but I would check with Alissa Calamity just to be safe.

## 2022-11-09 ENCOUNTER — POSTPARTUM VISIT (OUTPATIENT)
Dept: OBGYN CLINIC | Age: 37
End: 2022-11-09

## 2022-11-09 VITALS
BODY MASS INDEX: 23.39 KG/M2 | DIASTOLIC BLOOD PRESSURE: 73 MMHG | HEIGHT: 64 IN | HEART RATE: 89 BPM | SYSTOLIC BLOOD PRESSURE: 106 MMHG | WEIGHT: 137 LBS

## 2022-11-09 DIAGNOSIS — Z13.31 POSITIVE DEPRESSION SCREENING: ICD-10-CM

## 2022-11-09 DIAGNOSIS — O34.219 VBAC, DELIVERED: ICD-10-CM

## 2022-11-09 DIAGNOSIS — L01.00 IMPETIGO: ICD-10-CM

## 2022-11-09 PROBLEM — Z3A.39 39 WEEKS GESTATION OF PREGNANCY: Status: RESOLVED | Noted: 2022-10-24 | Resolved: 2022-11-09

## 2022-11-09 PROCEDURE — G8420 CALC BMI NORM PARAMETERS: HCPCS | Performed by: ADVANCED PRACTICE MIDWIFE

## 2022-11-09 PROCEDURE — G8484 FLU IMMUNIZE NO ADMIN: HCPCS | Performed by: ADVANCED PRACTICE MIDWIFE

## 2022-11-09 PROCEDURE — G8428 CUR MEDS NOT DOCUMENT: HCPCS | Performed by: ADVANCED PRACTICE MIDWIFE

## 2022-11-09 PROCEDURE — 1111F DSCHRG MED/CURRENT MED MERGE: CPT | Performed by: ADVANCED PRACTICE MIDWIFE

## 2022-11-09 PROCEDURE — 0503F POSTPARTUM CARE VISIT: CPT | Performed by: ADVANCED PRACTICE MIDWIFE

## 2022-11-09 PROCEDURE — 1036F TOBACCO NON-USER: CPT | Performed by: ADVANCED PRACTICE MIDWIFE

## 2022-11-09 PROCEDURE — S3005 EVAL SELF-ASSESS DEPRESSION: HCPCS | Performed by: ADVANCED PRACTICE MIDWIFE

## 2022-11-09 ASSESSMENT — ENCOUNTER SYMPTOMS
RESPIRATORY NEGATIVE: 1
EYES NEGATIVE: 1
ALLERGIC/IMMUNOLOGIC NEGATIVE: 1
GASTROINTESTINAL NEGATIVE: 1

## 2022-11-09 NOTE — PROGRESS NOTES
[facility]LO[facility]  CNM Office Note    Nathalie Leigh is a 30 y.o. female who presents today for her medical conditions/ complaints as noted below. Chief Complaint   Patient presents with    Postpartum Care     2 week PP; patient delivered vaginally on 10/25/2022. HPI  Timi Schwarz presents today for 2 weeks postpartum. She states her mood is unstable and feels anxious. She has she had PP depression with her other pregnancies. She states she has been depressed for 6-months. Denies Suicidal ideation or homicidal ideation. She is currently breast feeding. Also c/o new \"rash\" on abdomen. Daughter has been dx with impetigo. Visit Reason:  Post-Partum Visit       Baby's Name: [facility]       Delivery Date: 10/25/2022       Type of Delivery:         Feeding: pumping       LMP: 2022       Contraceptive Choices: no sex since the delivery, does not want on BC. Last PAP: 2021       Depression: \"yeah\"- scored 14  Patient Active Problem List   Diagnosis    Biliary dyskinesia       Patient's last menstrual period was 2022.   [id number]    Past Medical History:   Diagnosis Date    Esophageal reflux 10/10/2017    Gallbladder disease     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     Patient tested positive about two weeks ago from today, 16    Postpartum depression     undiagnosed    Prolactin deficiency ([facility] [address]     Vaginal birth after  () 10/25/2022     Past Surgical History:   Procedure Laterality Date     SECTION N/A 2021     SECTION performed by Ashlee Browne MD at [facility] L&D OR    CHOLECYSTECTOMY, LAPAROSCOPIC N/A 10/13/2016    CHOLECYSTECTOMY LAPAROSCOPIC performed by Omayra Beauchamp MD at [id number] [place] [address]       Family History   Problem Relation Age of Onset    Hypertension Mother     Diabetes Father     Hypertension Father     Alzheimer's Disease Maternal Grandmother     Diabetes Maternal Grandmother     Heart Disease Maternal Grandfather Social History     Tobacco Use    Smoking status: Never    Smokeless tobacco: Never   Substance Use Topics    Alcohol use: No       Current Outpatient Medications   Medication Sig Dispense Refill    sertraline (ZOLOFT) 50 MG tablet Take 1 tablet by mouth daily 30 tablet 0    ibuprofen (ADVIL;MOTRIN) 800 MG tablet Take 1 tablet by mouth every 8 hours as needed for Pain 30 tablet 0    docusate sodium (COLACE, DULCOLAX) 100 MG CAPS Take 100 mg by mouth 2 times daily 30 capsule 0    Prenatal MV-Min-Fe Fum-FA-DHA (PRENATAL 1 PO) Take by mouth       No current facility-administered medications for this visit. No Known Allergies  Vitals:    11/09/22 1454   BP: 106/73   Site: Left Upper Arm   Position: Sitting   Cuff Size: Medium Adult   Pulse: 89   Weight: 137 lb (62.1 kg)   Height: 5' 4\" (1.626 m)     Body mass index is 23.52 kg/m². Review of Systems   Constitutional: Negative. HENT: Negative. Eyes: Negative. Respiratory: Negative. Cardiovascular: Negative. Gastrointestinal: Negative. Endocrine: Negative. Genitourinary: Negative. Musculoskeletal: Negative. Skin:  Positive for rash. Allergic/Immunologic: Negative. Neurological: Negative. Hematological: Negative. Psychiatric/Behavioral:  Positive for dysphoric mood and sleep disturbance. Negative for self-injury and suicidal ideas. The patient is nervous/anxious. Depression     Physical Exam  Constitutional:       Appearance: Normal appearance. She is well-developed. She is not diaphoretic. HENT:      Head: Normocephalic and atraumatic. Nose: Nose normal.   Eyes:      Extraocular Movements: Extraocular movements intact. Conjunctiva/sclera: Conjunctivae normal.      Pupils: Pupils are equal, round, and reactive to light. Neck:      Thyroid: No thyromegaly. Trachea: No tracheal deviation. Pulmonary:      Effort: Pulmonary effort is normal. No respiratory distress.    Musculoskeletal: General: Normal range of motion. Cervical back: Normal range of motion and neck supple. Comments: Normal ROM for upper and lower extremities. Gait steady. Skin:     General: Skin is warm and dry. Findings: Erythema, lesion and rash present. Rash is crusting, macular, papular, pustular and scaling. Neurological:      Mental Status: She is alert and oriented to person, place, and time. Psychiatric:         Mood and Affect: Mood normal.         Speech: Speech normal.         Behavior: Behavior normal.        Diagnosis Orders   1. 2 weeks postpartum follow-up  AL EVAL SELF-ASSESS DEPRESSION      2. , delivered        3. Postpartum care following vaginal delivery        4. Postpartum care and examination        5. Positive depression screening        6. Impetigo            MEDICATIONS:  Orders Placed This Encounter   Medications    sertraline (ZOLOFT) 50 MG tablet     Sig: Take 1 tablet by mouth daily     Dispense:  30 tablet     Refill:  0       ORDERS:  Orders Placed This Encounter   Procedures    AL EVAL SELF-ASSESS DEPRESSION       PLAN:  PP Depression: Rx Zoloft 50mg. Pt educated on safety of medication and states understanding. FU In 3 weeks.   Rash - impetigo suspected - antibiotic ointment

## 2022-11-09 NOTE — PATIENT INSTRUCTIONS
Patient Education        After Pregnancy: Exercises  Introduction  Here are some examples of exercises that can help after pregnancy. Start each exercise slowly. Ease off the exercise if you start to have pain. Your doctor or physical therapist will tell you when you can start these exercises and which ones will work best for you. How to do the exercises  Tummy tuck  Lie on your back, and place two fingers just inside your hip bones so you can feel your lower belly muscles. Take a deep breath in. As you breathe out, pull your belly button in toward your spine, as if you are trying to zip up a tight pair of jeans. You should feel your lower belly muscles pull slightly away from your fingers as the muscles tighten. Hold for about 6 seconds, but do not hold your breath. Repeat 8 to 12 times. Repeat several times a day, and try to hold your lower belly muscles in for longer as you get stronger. Practice doing this exercise while you are standing, such as when you are standing in line, or sitting. Heel slides  Lie on the floor with your knees bent, and place two fingers just inside your hip bones so you can feel your lower belly muscles. Your feet should be flat on the floor. Pull your belly button in toward your spine. You should feel your lower belly muscles pull slightly away from your fingers as the muscles tighten. Keep holding your belly button in as you slowly slide one foot along the floor until your leg is out straight. Slowly slide the leg back to your starting position while making sure that you keep holding your belly button in. Do not arch or move your back as you are doing this. Do not hold your breath. Relax and repeat with your other leg. Repeat 8 to 12 times. Knee-to-chest stretch  Lie on your back with one knee bent and the other leg straight. Clasp your hands together under your bent knee and bring the knee to your chest. Keep your lower back pressed to the floor.   If it hurts your back to keep your opposite leg straight as you stretch, bend that knee too, and keep that foot flat on the floor. Hold for at least 15 to 30 seconds. Relax and lower the knee to the starting position. Repeat with the other leg. Repeat 2 to 4 times with each leg. Neck rotation  Sit in a firm chair, or stand up straight. Keeping your chin level, turn your head to the right, and hold for 15 to 30 seconds. Turn your head to the left and hold for 15 to 30 seconds. Repeat 2 to 4 times to each side. Shoulder rolls  Sit comfortably with your feet shoulder-width apart. You can also do this exercise while standing. Roll your shoulders up, then back, and then down in a smooth, circular motion. Repeat 2 to 4 times. Midback stretch  If you have knee pain, do not do this exercise. Kneel on the floor, and sit back on your ankles. Lean forward, place your hands on the floor, and stretch your arms out in front of you. Rest your head between your arms. Gently push your chest toward the floor, reaching as far in front of you as possible. Hold for 15 to 30 seconds. Repeat 2 to 4 times. Back stretches  Get down on your hands and knees on the floor. Relax your head and allow it to droop. Round your back up toward the ceiling until you feel a nice stretch in your upper, middle, and lower back. Hold this stretch for as long as it feels comfortable, or about 15 to 30 seconds. Return to the starting position with a flat back while you are on your hands and knees. Let your back sway by pressing your stomach toward the floor. Lift your buttocks toward the ceiling. Hold this position for 15 to 30 seconds. Repeat 2 to 4 times. Hamstring stretch (lying down)  Lie flat on your back with your legs straight. If you feel discomfort in your back, place a small towel roll under your lower back. Holding the back of your leg, lift your leg straight up and toward your body until you feel a stretch at the back of your thigh.   Hold doctor if you are having problems. It's also a good idea to know your test results and keep a list of the medicines you take. How can you care for yourself at home? To do Kegel exercises:  Squeeze your muscles as if you were trying not to pass gas. Or squeeze your muscles as if you were stopping the flow of urine. Your belly, legs, and buttocks shouldn't move. Hold the squeeze for 3 seconds, then relax for 5 to 10 seconds. Start with 3 seconds, then add 1 second each week until you are able to squeeze for 10 seconds. Repeat the exercise 10 times a session. Do 3 to 8 sessions a day. When learning what muscles to squeeze, you can try stopping the flow of urine a few times. But don't make it a practice to do Kegels while urinating. If doing these exercises causes pain, stop doing them and talk with your doctor. Sometimes people have pelvic floor muscles that are too tight. In these cases, doing Kegel exercises may cause more problems. Check with your doctor if you don't notice a difference after trying these exercises for several weeks. Your doctor may suggest getting help from a physical therapist or recommend other treatment. Where can you learn more? Go to https://[employer]. org and sign in to your RedMart account. Enter S423 in the Content Raven box to learn more about \"Kegel Exercises: Care Instructions. \"     If you do not have an account, please click on the \"Sign Up Now\" link. Current as of: June 16, 2022               Content Version: 13.4  © 2006-2022 Healthwise, Incorporated. Care instructions adapted under license by [facility] ([facility]). If you have questions about a medical condition or this instruction, always ask your healthcare professional. [place] [address] any warranty or liability for your use of this information.        Patient Education        Nutrition for Breastfeeding: Care Instructions  Overview     If you are breastfeeding, your doctor may suggest that you eat more calories each day than otherwise recommended for a person of your height and weight. Breastfeeding helps build the bond between you and your baby. It gives your baby excellent health benefits. A healthy diet includes eating a variety of foods from the basic food groups: grains, vegetables, fruits, milk and milk products (such as cheese and yogurt), and meat and dried beans. Eating well during breastfeeding will ensure that you stay healthy. Follow-up care is a key part of your treatment and safety. Be sure to make and go to all appointments, and call your doctor if you are having problems. It's also a good idea to know your test results and keep a list of the medicines you take. How can you care for yourself at home? Making good choices about what you eat and drink when you are breastfeeding can help you stay healthy. It can also help your baby stay healthy. Here are some things you can do. Eat a variety of healthy foods. This includes vegetables, fruits, milk products, whole grains, and protein. Drink plenty of fluids. Make water your first choice. If you have kidney, heart, or liver disease and have to limit fluids, talk with your doctor before you increase your fluids. Avoid fish with high mercury. This includes shark, swordfish, shabana mackerel, and marlin. It also includes orange roughy, bigeye tuna, and tilefish from the [facility]. Instead, eat fish that is low in mercury. Choose canned light tuna, salmon, pollock, catfish, or shrimp. Limit caffeine. Things like coffee, tea, chocolate, and some sodas can contain caffeine. Caffeine can pass through breast milk to your baby. It may cause fussiness and sleep problems. Talk to your doctor about how much caffeine is safe for you. Limit alcohol. Alcohol can pass through breast milk to your baby. Talk to your doctor if you have questions about drinking alcohol while breastfeeding. Be safe with supplements.   Talk with your doctor before taking any vitamins, minerals, and herbal or other dietary supplements. When should you call for help? Watch closely for changes in your health, and be sure to contact your doctor if you have any problems. Where can you learn more? Go to https://alba.[employer]. org and sign in to your Portalarium account. Enter P234 in the [employer] box to learn more about \"Nutrition for Breastfeeding: Care Instructions. \"     If you do not have an account, please click on the \"Sign Up Now\" link. Current as of: May 9, 2022               Content Version: 13.4  © 2006-2022 [employer]. Care instructions adapted under license by [facility] ([facility]). If you have questions about a medical condition or this instruction, always ask your healthcare professional. [address] any warranty or liability for your use of this information. Patient Education        Depression After Childbirth: Care Instructions  It's common to lose sleep, feel irritable, and cry easily during the first few days after childbirth. Hormone changes and the demands of a new baby can cause these \"baby blues. \" If these mood changes last more than 2 weeks, you may have postpartum depression. This is a medical condition that requires treatment. If you have any of these signs, you may be depressed. See your doctor right away. You feel very sad or hopeless and lose interest in daily activities. You sleep too much or not enough. You feel tired or as if you have no energy. You eat too much or too little. You write or talk about death. Where to get help 24 hours a day, 7 days a week   If you or someone you know talks about suicide, self-harm, a mental health crisis, a substance use crisis, or any other kind of emotional distress, get help right away. You can:   Call the Suicide and Crisis Lifeline at 21 465233.      Call 4-431-452-TALK . 2-438.604.6746    Text HOME to first weeks. What used to be a simple chore, such as buying groceries, is not so simple now. Plus, you have new chores, including feeding and changing your new baby. At the end of the day, you may be so tired that you feel like crying. Instead of looking forward to the next day, you may be dreading tomorrow. Like many new parents, you are burned out from the stress of having a new baby. Stress affects each of us differently, and the most effective ways to relieve it are different for each person. You can try different methods to find out which ones work best for you. As the weeks go by, you will begin to develop a rhythm with your baby. Tasks that now seem to take forever will become easier. Many women get the \"baby blues\" during the first few days after childbirth. If you are a new mother and the \"baby blues\" last more than a few days, call your doctor right away. Depression is a medical condition that requires treatment. Follow-up care is a key part of your treatment and safety. Be sure to make and go to all appointments, and call your doctor if you are having problems. It's also a good idea to know your test results and keep a list of the medicines you take. How can you care for yourself at home? Be kind to yourself. Your new baby takes a lot of work, but he or she can give you a lot of pleasure too. Do not worry about housekeeping for a while. Allow your friends to bring you meals or do chores. Limit visitors to as few as you feel you can handle, or ask them not to visit for a while. Before they come, set a limit on how long they will stay. Sleep when your baby sleeps. Even a short nap helps. Find what triggers your stress, and avoid those things as much as you can. If you breastfeed, learn how to collect and store some breast milk so your partner or  can feed the baby while you sleep.  Experts usually recommend waiting about a month until breastfeeding is going well before offering a bottle. Eat a balanced diet so you can keep up your energy. Drink plenty of fluids throughout the day. Avoid caffeine and alcohol. Caffeine is found in coffee, tea, cola drinks, chocolate, and other foods. Limit medicines that can make you more tired, such as tranquilizers and cold and allergy medicines. Get regular daily exercise, such as walks, to help improve your mood. Rest after you exercise. Be honest with yourself and those who care about you. Tell them you are stressed and tired. Talking to other new parents can help. Ask your doctor or child's doctor to suggest support groups for new parents. Hearing that someone else is having the same experiences you are can help a lot. If you have the baby blues for more than a few days, call your doctor right away. When should you call for help? Call 911 anytime you think you may need emergency care. For example, call if:    You have thoughts of hurting yourself, your baby, or another person. Call your doctor now or seek immediate medical care if:    You are having trouble caring for yourself or your baby. Watch closely for changes in your health, and be sure to contact your doctor if you have any problems. Where can you learn more? Go to https://FRM [employer] Course.Blossom. org and sign in to your SpaBoom account. Enter H142 in the Tenebril box to learn more about \"Stress in Parents of Infants: Care Instructions. \"     If you do not have an account, please click on the \"Sign Up Now\" link. Current as of: February 23, 2022               Content Version: 13.4  © 9085-5287 Healthwise, Incorporated. Care instructions adapted under license by [facility] ([facility]). If you have questions about a medical condition or this instruction, always ask your healthcare professional. [place] [address] any warranty or liability for your use of this information.

## 2022-11-09 NOTE — PROGRESS NOTES
The patient returns for her post-partum visit. All information below was reviewed with her. Visit Reason:  Post-Partum Visit       Baby's Name: Anatoly Kearns       Delivery Date: 10/25/2022       Type of Delivery:         Feeding: pumping       LMP: 2022       Contraceptive Choices: no sex since the delivery, does not want on BC. Last PAP: 2021       Depression: \"yeah\"- scored 14    Problems: she noticed a lump in her arm pit, she messaged and it has went down since. Thought it was probably d/t getting the COVID vaccine, but would like it checked to make sure its okay. Also, a rash broke out across her stomach, heat rash? Is there something she can put on it and still okay while breastfeeding. Wants it checked out.

## 2022-11-30 ENCOUNTER — POSTPARTUM VISIT (OUTPATIENT)
Dept: OBGYN CLINIC | Age: 37
End: 2022-11-30

## 2022-11-30 VITALS
SYSTOLIC BLOOD PRESSURE: 98 MMHG | DIASTOLIC BLOOD PRESSURE: 62 MMHG | WEIGHT: 134 LBS | BODY MASS INDEX: 22.88 KG/M2 | HEIGHT: 64 IN

## 2022-11-30 DIAGNOSIS — O34.219 VBAC, DELIVERED: ICD-10-CM

## 2022-11-30 DIAGNOSIS — Z13.32 ENCOUNTER FOR SCREENING FOR MATERNAL DEPRESSION: ICD-10-CM

## 2022-11-30 DIAGNOSIS — Z13.31 POSITIVE DEPRESSION SCREENING: ICD-10-CM

## 2022-11-30 PROCEDURE — G8428 CUR MEDS NOT DOCUMENT: HCPCS | Performed by: ADVANCED PRACTICE MIDWIFE

## 2022-11-30 PROCEDURE — 0503F POSTPARTUM CARE VISIT: CPT | Performed by: ADVANCED PRACTICE MIDWIFE

## 2022-11-30 PROCEDURE — S3005 EVAL SELF-ASSESS DEPRESSION: HCPCS | Performed by: ADVANCED PRACTICE MIDWIFE

## 2022-11-30 PROCEDURE — G8420 CALC BMI NORM PARAMETERS: HCPCS | Performed by: ADVANCED PRACTICE MIDWIFE

## 2022-11-30 PROCEDURE — G8484 FLU IMMUNIZE NO ADMIN: HCPCS | Performed by: ADVANCED PRACTICE MIDWIFE

## 2022-11-30 PROCEDURE — 1036F TOBACCO NON-USER: CPT | Performed by: ADVANCED PRACTICE MIDWIFE

## 2022-11-30 ASSESSMENT — ENCOUNTER SYMPTOMS
RESPIRATORY NEGATIVE: 1
ALLERGIC/IMMUNOLOGIC NEGATIVE: 1
GASTROINTESTINAL NEGATIVE: 1
EYES NEGATIVE: 1

## 2022-11-30 NOTE — PROGRESS NOTES
The patient returns for her 6 week post-partum visit. All information below was reviewed with her. Visit Reason:  Post-Partum Visit       Baby's Name: Nick Williamson       Delivery Date: 10/25/2022       Type of Delivery:         Feeding: pumping       LMP: 2022       Contraceptive Choices: no sex since the delivery, does not want on BC. Last PAP: 2021       Depression: pt states she has gotten a lot better on the medication. Problems: Pt states her Zoloft has been working.

## 2022-11-30 NOTE — PROGRESS NOTES
[facility]  CNM Office Note    Nathalie Leigh is a 30 y.o. female who presents today for her medical conditions/ complaints as noted below. Chief Complaint   Patient presents with    Postpartum Care         HPI  Hema Gotti presents for 6 week PP visit. She has no complaints and states the zoloft has greatly improved anxiety/depression. Visit Reason:  Post-Partum Visit       Baby's Name: Yaima Webber       Delivery Date: 10/25/2022       Type of Delivery:         Feeding: pumping       LMP: 2022       Contraceptive Choices: no sex since the delivery, does not want on BC. Last PAP: 2021       Depression: pt states she has gotten a lot better on the medication. Problems: Pt states her Zoloft has been working. EPDS last visit , today   Patient Active Problem List   Diagnosis    Biliary dyskinesia       No LMP recorded. (Menstrual status: Postpartum).   [id number]    Past Medical History:   Diagnosis Date    Esophageal reflux 10/10/2017    Gallbladder disease     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     Patient tested positive about two weeks ago from today, 16    Postpartum depression     undiagnosed    Prolactin deficiency ([address]     Vaginal birth after  () 10/25/2022     Past Surgical History:   Procedure Laterality Date     SECTION N/A 2021     SECTION performed by Ashlee Browne MD at [address] [facility] OR    CHOLECYSTECTOMY, LAPAROSCOPIC N/A 10/13/2016    CHOLECYSTECTOMY LAPAROSCOPIC performed by Omayra Beauchamp MD at [id number]       Family History   Problem Relation Age of Onset    Hypertension Mother     Diabetes Father     Hypertension Father     Alzheimer's Disease Maternal Grandmother     Diabetes Maternal Grandmother     Heart Disease Maternal Grandfather      Social History     Tobacco Use    Smoking status: Never    Smokeless tobacco: Never   Substance Use Topics    Alcohol use: No       Current Outpatient Medications   Medication Sig Dispense Refill    sertraline (ZOLOFT) 50 MG tablet Take 1 tablet by mouth daily 30 tablet 0    Prenatal MV-Min-Fe Fum-FA-DHA (PRENATAL 1 PO) Take by mouth       No current facility-administered medications for this visit. No Known Allergies  Vitals:    22 1031   BP: 98/62   Weight: 134 lb (60.8 kg)   Height: 5' 4\" (1.626 m)     Body mass index is 23 kg/m². Review of Systems   Constitutional: Negative. HENT: Negative. Eyes: Negative. Respiratory: Negative. Cardiovascular: Negative. Gastrointestinal: Negative. Endocrine: Negative. Genitourinary: Negative. Musculoskeletal: Negative. Skin: Negative. Allergic/Immunologic: Negative. Neurological: Negative. Hematological: Negative. Psychiatric/Behavioral:  Positive for dysphoric mood (IMPROVED). The patient is nervous/anxious (IMPROVED). Physical Exam     Diagnosis Orders   1. 6 weeks postpartum follow-up        2. , delivered        3. Postpartum care following vaginal delivery        4. Positive depression screening        5. Postpartum care and examination        6. Encounter for screening for maternal depression            MEDICATIONS:  No orders of the defined types were placed in this encounter. ORDERS:  No orders of the defined types were placed in this encounter. PLAN:  Continue zoloft at current dose.

## 2023-02-09 NOTE — PATIENT INSTRUCTIONS
Patient Education        Well Visit, Ages 25 to 72: Care Instructions  Well visits can help you stay healthy. Your doctor has checked your overall health and may have suggested ways to take good care of yourself. Your doctor also may have recommended tests. You can help prevent illness with healthy eating, good sleep, vaccinations, regular exercise, and other steps. Get the tests that you and your doctor decide on. Depending on your age and risks, examples might include screening for diabetes; hepatitis C; HIV; and cervical, breast, lung, and colon cancer. Screening helps find diseases before any symptoms appear. Eat healthy foods. Choose fruits, vegetables, whole grains, lean protein, and low-fat dairy foods. Limit saturated fat and reduce salt. Limit alcohol. Men should have no more than 2 drinks a day. Women should have no more than 1. For some people, no alcohol is the best choice. Exercise. Get at least 30 minutes of exercise on most days of the week. Walking can be a good choice. Reach and stay at your healthy weight. This will lower your risk for many health problems. Take care of your mental health. Try to stay connected with friends, family, and community, and find ways to manage stress. If you're feeling depressed or hopeless, talk to someone. A counselor can help. If you don't have a counselor, talk to your doctor. Talk to your doctor if you think you may have a problem with alcohol or drug use. This includes prescription medicines and illegal drugs. Avoid tobacco and nicotine: Don't smoke, vape, or chew. If you need help quitting, talk to your doctor. Practice safer sex. Getting tested, using condoms or dental dams, and limiting sex partners can help prevent STIs. Use birth control if it's important to you to prevent pregnancy. Talk with your doctor about your choices and what might be best for you. Prevent problems where you can.  Protect your skin from too much sun, wash your hands, brush your teeth twice a day, and wear a seat belt in the car. Where can you learn more? Go to http://www.dixon.com/ and enter P072 to learn more about \"Well Visit, Ages 25 to 72: Care Instructions. \"  Current as of: March 9, 2022               Content Version: 13.5  © 2006-2022 [employer]. Care instructions adapted under license by [email or web address]. If you have questions about a medical condition or this instruction, always ask your healthcare professional. [address] any warranty or liability for your use of this information. Patient Education        Pap Test: Care Instructions  Overview     The Pap test (also called a Pap smear) is a screening test for cancer of the cervix, which is the lower part of the uterus that opens into the vagina. The test can help your doctor find early changes in the cells that could lead to cancer. The sample of cells taken during your test has been sent to a lab so that an expert can look at the cells. It usually takes a week or two to get the results back. Follow-up care is a key part of your treatment and safety. Be sure to make and go to all appointments, and call your doctor if you are having problems. It's also a good idea to know your test results and keep a list of the medicines you take. What do the results mean? A normal result means that the test did not find any abnormal cells in the sample. An abnormal result can mean many things. Most of these are not cancer. The results of your test may be abnormal because: You have an infection of the vagina or cervix, such as a yeast infection. You have low estrogen levels after menopause that are causing the cells to change. You have cell changes that may be a sign of precancer or cancer. The results are ranked based on how serious the changes might be. If the results were abnormal, you may need to have other tests.  If the results show changes that could be a sign of cancer, you may need a test called a colposcopy, which provides a more complete view of the cervix. Sometimes the lab cannot use the sample because it does not contain enough cells or was not preserved well. If so, you may need to have the test again. This is not common, but it does happen from time to time. When should you call for help? Watch closely for changes in your health, and be sure to contact your doctor if:    You have vaginal bleeding or pain for more than 2 days after the test. It is normal to have a small amount of bleeding for a day or two after the test.   Where can you learn more? Go to http://www.woods.com/ and enter U972 to learn more about \"Pap Test: Care Instructions. \"  Current as of: August 2, 2022               Content Version: 13.5  © 4240-2137 [employer]. Care instructions adapted under license by [employer] [address] [employer] If you have questions about a medical condition or this instruction, always ask your healthcare professional. [place] [address] any warranty or liability for your use of this information. Patient Education        Learning About Cervical Cancer Screening  What is a cervical cancer screening test?     The cervix is the lower part of the uterus that opens into the vagina. Cervical cancer screening tests check the cells on the cervix for changes that could lead to cancer. Two tests can be used to screen for cervical cancer. They may be used alone or together. A Pap test.  This test looks for changes in the cells of the cervix. Some of these cell changes could lead to cancer. A human papillomavirus (HPV) test.  This test looks for the HPV virus. Some high-risk types of HPV can cause cell changes that could lead to cervical cancer. When should you have a screening test?  If you have a cervix, you may need cervical cancer screening.  This depends on your age, your risk of cervical cancer, and results from previous tests. Ages 24 to 34  Screening options include: A Pap test. If your results are normal, you can wait 3 years to have another test.  An HPV test beginning at age 22. If your results are negative, you can wait 5 years to have another test. (This test is not commonly used.)  Ages 27 to 59  Screening options include: A Pap test. If your results are normal, you can wait 3 years to have another test.  An HPV test. If your results are negative, you can wait 5 years to have another test.  A Pap test and an HPV test. If your results are normal, you can wait 5 years to be tested again. Ages 72 and older  If you are age 72 or older and you've always had normal screening results, you may not need screening. Talk to your doctor. What do the results of cervical cancer screening mean? Your test results may be normal. Or the results may show minor or serious changes to the cells on your cervix. Minor changes may go away on their own, especially if you are younger than 30. You may have an abnormal test because you have an infection of the vagina or cervix or because you have low estrogen levels after menopause that are causing the cells to change. If you have a high-risk type of human papillomavirus (HPV) or cell changes that could turn into cancer, you may need more tests. Your doctor may suggest that you wait to be retested. Or you may need to have a colposcopy or treatment right away. Your doctor will recommend a follow-up plan based on your results and your age. Follow-up care is a key part of your treatment and safety. Be sure to make and go to all appointments, and call your doctor if you are having problems. It's also a good idea to know your test results and keep a list of the medicines you take. Where can you learn more? Go to http://www.woods.com/ and enter P919 to learn more about \"Learning About Cervical Cancer Screening. \"  Current as of:  May 4, 2022               Content Version: 13.5  © 9064-9598 [employer]. Care instructions adapted under license by [employer]. If you have questions about a medical condition or this instruction, always ask your healthcare professional. [address] any warranty or liability for your use of this information. Patient Education        Human Papillomavirus (HPV): Care Instructions  Overview  The human papillomavirus (HPV) is a very common virus. There are many types of HPV. Some types cause the common skin wart. Other types cause genital warts, which can be spread by sexual contact. Some types can increase the risk of certain cancers, such as cervical or anal cancer. Having one type of HPV doesn't lead to having another type. Many people who have HPV don't know that they're infected. It's often found with a cervical cancer screening test, such as an HPV test.  If an HPV screening test finds that you have a type of HPV that might lead to cancer, your doctor may suggest more tests. This doesn't mean you'll get cancer. But it means that you may have an increased risk. Abnormal cell changes caused by HPV often go away on their own. If they don't, they can be treated. Follow-up care is a key part of your treatment and safety. Be sure to make and go to all appointments, and call your doctor if you are having problems. It's also a good idea to know your test results and keep a list of the medicines you take. How can you care for yourself at home? Use a condom every time you have sex. Use it from the start to the end of sexual contact. Be sure to tell your sexual partner or partners that you have HPV. Even if you don't have symptoms, you can still pass HPV to others. Limit how many sex partners you have. The safest practice is to have only one sex partner who doesn't have STIs and doesn't have sex with anyone else. This lowers your risk of getting STIs. Don't smoke.  Smoking increases the risk for cervical problems and cervical cancer. If you need help quitting, talk to your doctor about stop-smoking programs and medicines. These can increase your chances of quitting for good. When should you call for help? Watch closely for changes in your health, and be sure to contact your doctor if:    You have vaginal pain during or after sex. You have vaginal bleeding when you are not in your menstrual period. Where can you learn more? Go to http://www.woods.com/ and enter F690 to learn more about \"Human Papillomavirus (HPV): Care Instructions. \"  Current as of: August 2, 2022               Content Version: 13.5  © 2755-5111 [employer]. Care instructions adapted under license by [facility] ([facility]). If you have questions about a medical condition or this instruction, always ask your healthcare professional. [address] any warranty or liability for your use of this information. Patient Education        Breast Self-Exam: Care Instructions  Your Care Instructions     A breast self-exam is when you check your breasts for lumps or changes. This regular exam helps you learn how your breasts normally look and feel. Most breast problems or changes are not because of cancer. Breast self-exam is not a substitute for a mammogram. Having regular breast exams by your doctor and regular mammograms improve your chances of finding any problems with your breasts. Some women set a time each month to do a step-by-step breast self-exam. Other women like a less formal system. They might look at their breasts as they brush their teeth, or feel their breasts once in a while in the shower. If you notice a change in your breast, tell your doctor. Follow-up care is a key part of your treatment and safety. Be sure to make and go to all appointments, and call your doctor if you are having problems. It's also a good idea to know your test results and keep a list of the medicines you take.   How do you do a breast self-exam?  The best time to examine your breasts is usually one week after your menstrual period begins. Your breasts should not be tender then. If you do not have periods, you might do your exam on a day of the month that is easy to remember. To examine your breasts:  Remove all your clothes above the waist and lie down. When you are lying down, your breast tissue spreads evenly over your chest wall, which makes it easier to feel all your breast tissue. Use the pads--not the fingertips--of the 3 middle fingers of your left hand to check your right breast. Move your fingers slowly in small coin-sized circles that overlap. Use three levels of pressure to feel of all your breast tissue. Use light pressure to feel the tissue close to the skin surface. Use medium pressure to feel a little deeper. Use firm pressure to feel your tissue close to your breastbone and ribs. Use each pressure level to feel your breast tissue before moving on to the next spot. Check your entire breast, moving up and down as if following a strip from the collarbone to the bra line, and from the armpit to the ribs. Repeat until you have covered the entire breast.  Repeat this procedure for your left breast, using the pads of the 3 middle fingers of your right hand. To examine your breasts while in the shower:  Place one arm over your head and lightly soap your breast on that side. Using the pads of your fingers, gently move your hand over your breast (in the strip pattern described above), feeling carefully for any lumps or changes. Repeat for the other breast.  Have your doctor inspect anything you notice to see if you need further testing. Where can you learn more? Go to http://www.woods.com/ and enter P148 to learn more about \"Breast Self-Exam: Care Instructions. \"  Current as of: August 2, 2022               Content Version: 13.5  © 4128-3399 Healthwise, Incorporated.    Care instructions adapted under license by [facility] ([facility]). If you have questions about a medical condition or this instruction, always ask your healthcare professional. [address] any warranty or liability for your use of this information. Patient Education        Learning About Breast Cancer Screening  What is breast cancer screening? Breast cancer occurs when cells that are not normal grow in one or both of your breasts. Screening tests can help find breast cancer early. Cancer is easier to treat when it's found early. Having concerns about breast cancer is common. That's why it's important to talk with your doctor about when to start and how often to get screened for breast cancer. How is breast cancer screening done? Several screening tests can be used to check for breast cancer. Mammograms. These tests check for signs of cancer using X-rays. They can show tumors that are too small to feel. During a mammogram, a machine squeezes your breasts to make them flatter and easier to X-ray. At least two pictures are taken of each breast. One is taken from the top and one from the side. Clinical breast exam.  In this exam, your doctor carefully feels your breasts and under your arms to check for lumps or other changes. MRI (magnetic resonance imaging) of the breast.  A standard MRI may be used as a screening test if you have a high risk of breast cancer. Your breast cancer screening center may offer you other tests, such as digital breast tomosynthesis (3D mammograms) or an abbreviated breast MRI (sometimes called a \"fast MRI\"). Ask your doctor whether these tests are right for you. Who should be screened for breast cancer? Experts agree that mammograms are the best screening test for people at average risk of breast cancer. But they don't all agree on the age at which screening should start. And they don't agree on whether it's better to be screened every year or every two years.   Here are some of the recommendations from experts:  Start by age 40 and have a mammogram each year.  Start at age 45 and have a mammogram each year.  Start at age 50 and have a mammogram every 2 years.  When to stop having mammograms is another decision. You and your doctor can decide on the right age to start and stop screening based on your personal preferences and overall health.  What is your risk for breast cancer?  If you don't already know your risk of breast cancer, you can ask your doctor about it. You can also look it up at www.cancer.gov/bcrisktool/.  If your doctor says that you have a high or very high risk, ask about ways to reduce your risk. These could include getting extra screening, taking medicine, or having surgery. If you have a strong family history of breast cancer, ask your doctor about genetic testing.  What steps can you take to stay healthy?  Some things that increase your risk of breast cancer, such as your age and being female, cannot be controlled. But you can do some things to stay as healthy as you can.  Learn what your breasts normally look and feel like. If you notice any changes, tell your doctor.  If you drink alcohol, limit how much you drink. Any amount of alcohol may increase your risk for some types of cancer.  If you smoke, quit. When you quit smoking, you lower your chances of getting many types of cancer.  You can also do your best to eat well, be active, and stay at a healthy weight. Eating healthy foods and being active every day, as well as staying at a healthy weight, may help prevent cancer.  Where can you learn more?  Go to https://www.[facility].net/patientEd and enter H706 to learn more about \"Learning About Breast Cancer Screening.\"  Current as of: May 4, 2022               Content Version: 13.5  © 0779-3443 Healthwise, Incorporated.   Care instructions adapted under license by [employer]. If you have questions about a medical condition or this instruction, always ask your healthcare professional.  Healthwise, Incorporated disclaims any warranty or liability for your use of this information. Patient Education        A Healthy Lifestyle: Care Instructions  A healthy lifestyle can help you feel good, have more energy, and stay at a weight that's healthy for you. You can share a healthy lifestyle with your friends and family. And you can do it on your own. Eat meals with your friends or family. You could try cooking together. Plan activities with other people. Go for a walk with a friend, try a free online fitness class, or join a sports league. Eat a variety of healthy foods. These include fruits, vegetables, whole grains, low-fat dairy, and lean protein. Choose healthy portions of food. You can use the Nutrition Facts label on food packages as a guide. Eat more fruits and vegetables. You could add vegetables to sandwiches or add fruit to cereal.   Drink water when you are thirsty. Limit soda, juice, and sports drinks. Try to exercise most days. Aim for at least 2½ hours of exercise each week. Keep moving. Work in the garden or take your dog on a walk. Use the stairs instead of the elevator. If you use tobacco or nicotine, try to quit. Ask your doctor about programs and medicines to help you quit. Limit alcohol. Men should have no more than 2 drinks a day. Women should have no more than 1. For some people, no alcohol is the best choice. Follow-up care is a key part of your treatment and safety. Be sure to make and go to all appointments, and call your doctor if you are having problems. It's also a good idea to know your test results and keep a list of the medicines you take. Where can you learn more? Go to http://www.dixon.com/ and enter U807 to learn more about \"A Healthy Lifestyle: Care Instructions. \"  Current as of: March 9, 2022               Content Version: 13.5  © 4343-8790 [employer].    Care instructions adapted under license by [address]. If you have questions about a medical condition or this instruction, always ask your healthcare professional. [place] [address] any warranty or liability for your use of this information.

## 2023-02-13 ENCOUNTER — OFFICE VISIT (OUTPATIENT)
Dept: OBGYN CLINIC | Age: 38
End: 2023-02-13
Payer: COMMERCIAL

## 2023-02-13 VITALS
BODY MASS INDEX: 23.73 KG/M2 | DIASTOLIC BLOOD PRESSURE: 68 MMHG | SYSTOLIC BLOOD PRESSURE: 100 MMHG | WEIGHT: 139 LBS | HEIGHT: 64 IN

## 2023-02-13 DIAGNOSIS — Z11.51 SCREENING FOR HPV (HUMAN PAPILLOMAVIRUS): ICD-10-CM

## 2023-02-13 DIAGNOSIS — Z12.4 SCREENING FOR CERVICAL CANCER: ICD-10-CM

## 2023-02-13 DIAGNOSIS — Z01.419 ENCOUNTER FOR WELL WOMAN EXAM WITH ROUTINE GYNECOLOGICAL EXAM: Primary | ICD-10-CM

## 2023-02-13 PROCEDURE — G8484 FLU IMMUNIZE NO ADMIN: HCPCS | Performed by: ADVANCED PRACTICE MIDWIFE

## 2023-02-13 PROCEDURE — 99395 PREV VISIT EST AGE 18-39: CPT | Performed by: ADVANCED PRACTICE MIDWIFE

## 2023-02-13 NOTE — PROGRESS NOTES
Pt presents today for pap smear and breast exam.    She also complains of     Last mammogram: never  Last pap smear: 2021   Sexually active: Yes  Sexual preference: Male  Contraception: none  : 3  Para: 3  AB: 0  Last bone density: never   Last colonoscopy: never  Menarche: age 8  LMP: none since delivery-still breastfeeding  Menses: irregular    No complaints

## 2023-02-13 NOTE — PROGRESS NOTES
[facility]  CNM Office Note    Arnulfo Hartmann is a 30 y.o. female who presents today for her medical conditions/ complaints as noted below. Chief Complaint   Patient presents with    Gynecologic Exam     Here for yearly    Annual Exam     No complaints         HPI  Karthik Carrasco presents for annual gyn exam. She has no complaints. She is 4 months PP and breastfeeding. She c/o decreased libido. No menses since childbirth. Problems/Complaints today:  none  Patient Active Problem List   Diagnosis    Biliary dyskinesia       No LMP recorded. (Menstrual status: Postpartum). [id number]    Past Medical History:   Diagnosis Date    Esophageal reflux 10/10/2017    Gallbladder disease     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     Patient tested positive about two weeks ago from today, 16    Postpartum depression     undiagnosed    Prolactin deficiency ([facility] [address]     Vaginal birth after  () 10/25/2022     Past Surgical History:   Procedure Laterality Date     SECTION N/A 2021     SECTION performed by Latanya Wagoner MD at [address] [facility] L&D OR    CHOLECYSTECTOMY, LAPAROSCOPIC N/A 10/13/2016    CHOLECYSTECTOMY LAPAROSCOPIC performed by Allen Bennett MD at [address]        Family History   Problem Relation Age of Onset    Hypertension Mother     Diabetes Father     Hypertension Father     Alzheimer's Disease Maternal Grandmother     Diabetes Maternal Grandmother     Heart Disease Maternal Grandfather      Social History     Tobacco Use    Smoking status: Never    Smokeless tobacco: Never   Substance Use Topics    Alcohol use: No       Current Outpatient Medications   Medication Sig Dispense Refill    sertraline (ZOLOFT) 50 MG tablet Take 1 tablet by mouth daily 90 tablet 2    Prenatal MV-Min-Fe Fum-FA-DHA (PRENATAL 1 PO) Take by mouth       No current facility-administered medications for this visit.      No Known Allergies  Vitals:    23 0837   BP: 100/68 Site: Left Upper Arm   Position: Sitting   Cuff Size: Medium Adult   Weight: 139 lb (63 kg)   Height: 5' 4\" (1.626 m)     Body mass index is 23.86 kg/m². A comprehensive review of systems was negative except for what was noted in the HPI. Physical Exam  Constitutional:       Appearance: She is well-developed. HENT:      Head: Normocephalic and atraumatic. Eyes:      Conjunctiva/sclera: Conjunctivae normal.      Pupils: Pupils are equal, round, and reactive to light. Neck:      Thyroid: No thyromegaly. Trachea: No tracheal deviation. Cardiovascular:      Rate and Rhythm: Normal rate and regular rhythm. Heart sounds: Normal heart sounds. No murmur heard. Pulmonary:      Effort: Pulmonary effort is normal. No respiratory distress. Breath sounds: Normal breath sounds. Chest:      Comments: Breasts symmetrical without tenderness, masses, or nipple discharge. Nipples everted bilaterally. Abdominal:      General: There is no distension. Palpations: Abdomen is soft. Tenderness: There is no abdominal tenderness. There is no guarding. Genitourinary:     General: Normal vulva. Exam position: Lithotomy position. Labia:         Right: No rash or lesion. Left: No rash or lesion. Vagina: Normal. No erythema or lesions. Cervix: Normal.      Uterus: Normal. Not tender. Adnexa: Right adnexa normal and left adnexa normal.        Right: No mass, tenderness or fullness. Left: No mass, tenderness or fullness. Musculoskeletal:         General: Normal range of motion. Cervical back: Normal range of motion and neck supple. Skin:     General: Skin is warm and dry. Capillary Refill: Capillary refill takes less than 2 seconds. Neurological:      Mental Status: She is alert and oriented to person, place, and time. Psychiatric:         Behavior: Behavior normal.         Thought Content:  Thought content normal.         Judgment: Judgment normal.        Diagnosis Orders   1. Encounter for well woman exam with routine gynecological exam  PAP SMEAR      2. Screening for cervical cancer  PAP SMEAR      3. Screening for HPV (human papillomavirus)  Human papillomavirus (HPV) DNA probe thin prep high risk          MEDICATIONS:  No orders of the defined types were placed in this encounter. ORDERS:  Orders Placed This Encounter   Procedures    PAP SMEAR    Human papillomavirus (HPV) DNA probe thin prep high risk       PLAN:  WWE - PAP w HPV collected. SBE reviewed and CBE performed. No annual labs today. Plan to decrease zoloft after weaning.

## 2023-02-17 LAB
HPV COMMENT: NORMAL
HPV TYPE 16: NOT DETECTED
HPV TYPE 18: NOT DETECTED
HPVOH (OTHER TYPES): NOT DETECTED

## 2023-03-13 ENCOUNTER — OFFICE VISIT (OUTPATIENT)
Dept: PRIMARY CARE CLINIC | Age: 38
End: 2023-03-13
Payer: COMMERCIAL

## 2023-03-13 VITALS
SYSTOLIC BLOOD PRESSURE: 114 MMHG | HEART RATE: 57 BPM | DIASTOLIC BLOOD PRESSURE: 60 MMHG | OXYGEN SATURATION: 99 % | BODY MASS INDEX: 23.56 KG/M2 | WEIGHT: 138 LBS | HEIGHT: 64 IN | RESPIRATION RATE: 20 BRPM | TEMPERATURE: 97.1 F

## 2023-03-13 DIAGNOSIS — Z00.00 ENCOUNTER FOR WELL ADULT EXAM WITHOUT ABNORMAL FINDINGS: ICD-10-CM

## 2023-03-13 DIAGNOSIS — Z00.00 ANNUAL PHYSICAL EXAM: Primary | ICD-10-CM

## 2023-03-13 PROCEDURE — G8484 FLU IMMUNIZE NO ADMIN: HCPCS | Performed by: NURSE PRACTITIONER

## 2023-03-13 PROCEDURE — 99395 PREV VISIT EST AGE 18-39: CPT | Performed by: NURSE PRACTITIONER

## 2023-03-13 SDOH — ECONOMIC STABILITY: FOOD INSECURITY: WITHIN THE PAST 12 MONTHS, YOU WORRIED THAT YOUR FOOD WOULD RUN OUT BEFORE YOU GOT MONEY TO BUY MORE.: NEVER TRUE

## 2023-03-13 SDOH — ECONOMIC STABILITY: HOUSING INSECURITY
IN THE LAST 12 MONTHS, WAS THERE A TIME WHEN YOU DID NOT HAVE A STEADY PLACE TO SLEEP OR SLEPT IN A SHELTER (INCLUDING NOW)?: NO

## 2023-03-13 SDOH — ECONOMIC STABILITY: FOOD INSECURITY: WITHIN THE PAST 12 MONTHS, THE FOOD YOU BOUGHT JUST DIDN'T LAST AND YOU DIDN'T HAVE MONEY TO GET MORE.: NEVER TRUE

## 2023-03-13 SDOH — ECONOMIC STABILITY: INCOME INSECURITY: HOW HARD IS IT FOR YOU TO PAY FOR THE VERY BASICS LIKE FOOD, HOUSING, MEDICAL CARE, AND HEATING?: NOT HARD AT ALL

## 2023-03-13 ASSESSMENT — PATIENT HEALTH QUESTIONNAIRE - PHQ9
SUM OF ALL RESPONSES TO PHQ QUESTIONS 1-9: 0
SUM OF ALL RESPONSES TO PHQ QUESTIONS 1-9: 0
2. FEELING DOWN, DEPRESSED OR HOPELESS: 0
SUM OF ALL RESPONSES TO PHQ QUESTIONS 1-9: 0
SUM OF ALL RESPONSES TO PHQ QUESTIONS 1-9: 0
SUM OF ALL RESPONSES TO PHQ9 QUESTIONS 1 & 2: 0
1. LITTLE INTEREST OR PLEASURE IN DOING THINGS: 0

## 2023-03-13 ASSESSMENT — ENCOUNTER SYMPTOMS
VOMITING: 0
PHOTOPHOBIA: 0
SHORTNESS OF BREATH: 0
COUGH: 0
VOICE CHANGE: 0
COLOR CHANGE: 0
BACK PAIN: 0
NAUSEA: 0
RHINORRHEA: 0

## 2023-03-13 NOTE — PROGRESS NOTES
[address]  [address] [address] [address]  [address] [address]  Dept: [phone]  Dept Fax: [phone]  Loc: [phone]    Roger Bolanos is a 30 y.o. female who presents today for her medical conditions/complaints as noted below. Roger Bolanos is c/o of Annual Exam      HPI:     HPI   Chief Complaint   Patient presents with    Annual Exam     Patient presents today for annual physical exam and follow-up anxiety/depression. She reports she is doing well on sertraline. Denies SI. She is UTD on pap. She is due for fasting labs.      Past Medical History:   Diagnosis Date    Esophageal reflux 10/10/2017    Gallbladder disease     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     Patient tested positive about two weeks ago from today, 16    Postpartum depression     undiagnosed    Prolactin deficiency ([facility] [address]     Vaginal birth after  () 10/25/2022      Past Surgical History:   Procedure Laterality Date     SECTION N/A 2021     SECTION performed by Edy Girard MD at [facility] L&D OR    CHOLECYSTECTOMY, LAPAROSCOPIC N/A 10/13/2016    CHOLECYSTECTOMY LAPAROSCOPIC performed by Chris Mckeon MD at  [address] 3/13/2023 2023 2022 2022 10/27/2022    SYSTOLIC 403 341 98 [phone]   DIASTOLIC 60 68 62 73 80 73   Site - Left Upper Arm - Left Upper Arm - -   Position - Sitting - Sitting - -   Cuff Size - Medium Adult - Medium Adult - -   Pulse 57 - - 89 65 58   Temp 97.1 - - - 97.2 97.6   Resp 20 - - - 18 -   SpO2 99 - - - 100 100   Weight 138 lb 139 lb 134 lb 137 lb - -   Height 5' 4\" 5' 4\" 5' 4\" 5' 4\" - -   Body mass index 23.69 kg/m2 23.86 kg/m2 23 kg/m2 23.51 kg/m2 - -   Pain Level - - - - - -   Some recent data might be hidden       Family History   Problem Relation Age of Onset    Hypertension Mother     Diabetes Father     Hypertension Father     Alzheimer's Disease Maternal Grandmother     Diabetes Maternal Grandmother     Heart Disease Maternal Grandfather        Social History     Tobacco Use    Smoking status: Never    Smokeless tobacco: Never   Substance Use Topics    Alcohol use: No      Current Outpatient Medications on File Prior to Visit   Medication Sig Dispense Refill    sertraline (ZOLOFT) 50 MG tablet Take 1 tablet by mouth daily 90 tablet 2    Prenatal MV-Min-Fe Fum-FA-DHA (PRENATAL 1 PO) Take by mouth       No current facility-administered medications on file prior to visit. No Known Allergies    Health Maintenance   Topic Date Due    Varicella vaccine (1 of 2 - 2-dose childhood series) Never done    Flu vaccine (1) 08/01/2022    Depression Screen  03/08/2023    Cervical cancer screen  02/13/2028    DTaP/Tdap/Td vaccine (5 - Td or Tdap) 10/14/2032    COVID-19 Vaccine  Completed    Hepatitis C screen  Completed    HIV screen  Completed    Hepatitis A vaccine  Aged Out    Hib vaccine  Aged Out    Meningococcal (ACWY) vaccine  Aged Out    Pneumococcal 0-64 years Vaccine  Aged Out       Subjective:      Review of Systems   Constitutional:  Negative for chills and fever. HENT:  Negative for ear pain, hearing loss, rhinorrhea and voice change. Eyes:  Negative for photophobia and visual disturbance. Respiratory:  Negative for cough and shortness of breath. Cardiovascular:  Negative for chest pain and palpitations. Gastrointestinal:  Negative for nausea and vomiting. Endocrine: Negative. Negative for cold intolerance and heat intolerance. Genitourinary:  Negative for difficulty urinating and flank pain. Musculoskeletal:  Negative for back pain and neck pain. Skin:  Negative for color change and rash. Allergic/Immunologic: Negative for environmental allergies and food allergies. Neurological:  Negative for dizziness, speech difficulty and headaches. Hematological:  Does not bruise/bleed easily.    Psychiatric/Behavioral:  Negative for sleep disturbance and suicidal ideas. Objective:     Physical Exam  Vitals and nursing note reviewed. Constitutional:       Appearance: She is well-developed. HENT:      Head: Atraumatic. Right Ear: External ear normal.      Left Ear: External ear normal.      Nose: Nose normal.   Eyes:      Conjunctiva/sclera: Conjunctivae normal.      Pupils: Pupils are equal, round, and reactive to light. Cardiovascular:      Rate and Rhythm: Normal rate and regular rhythm. Heart sounds: Normal heart sounds, S1 normal and S2 normal.   Pulmonary:      Effort: Pulmonary effort is normal.      Breath sounds: Normal breath sounds. Abdominal:      General: Bowel sounds are normal.      Palpations: Abdomen is soft. Musculoskeletal:         General: Normal range of motion. Cervical back: Normal range of motion and neck supple. Skin:     General: Skin is warm and dry. Neurological:      Mental Status: She is alert and oriented to person, place, and time. Psychiatric:         Behavior: Behavior normal.     /60   Pulse 57   Temp 97.1 °F (36.2 °C) (Temporal)   Resp 20   Ht 5' 4\" (1.626 m)   Wt 138 lb (62.6 kg)   SpO2 99%   BMI 23.69 kg/m²     Assessment:       Diagnosis Orders   1. Annual physical exam  Comprehensive Metabolic Panel    CBC with Auto Differential    Lipid Panel    Hemoglobin A1C    Vitamin D 25 Hydroxy    TSH    Urinalysis with Reflex to Culture      2. Encounter for well adult exam without abnormal findings              Plan:     Fasting labs in [address] within 1-2 weeks. Follow-up in 1 year or sooner if needed.      PDMP Monitoring:    Last PDMP Baljit Mullins as Reviewed:  Review User Review Instant Review Result            Urine Drug Screenings (1 yr)       Urine Drug Screen  Collected: 5/4/2021  6:12 AM (Final result)              Urine Drug Screen  Collected: 12/27/2018  8:05 PM (Final result)                  Medication Contract and Consent for Opioid Use Documents Filed        No documents found                     Patient given educational materials -see patient instructions. Discussed use, benefit, and side effects of prescribed medications. All patient questions answered. Pt voiced understanding. Reviewed health maintenance. Instructed to continue currentmedications, diet and exercise. Patient agreed with treatment plan. Follow up as directed. MEDICATIONS:  No orders of the defined types were placed in this encounter. ORDERS:  Orders Placed This Encounter   Procedures    Comprehensive Metabolic Panel    CBC with Auto Differential    Lipid Panel    Hemoglobin A1C    Vitamin D 25 Hydroxy    TSH    Urinalysis with Reflex to Culture       Follow-up:  Return in about 1 year (around 3/13/2024) for physical.    PATIENT INSTRUCTIONS:  There are no Patient Instructions on file for this visit. Electronically signed by DIANA Haynes on 3/13/2023 at 9:10 AM    EMR Dragon/transcription disclaimer:  Much of thisencounter note is electronic transcription/translation of spoken language to printed texts. The electronic translation of spoken language may be erroneous, or at times, nonsensical words or phrases may be inadvertentlytranscribed.   Although I have reviewed the note for such errors, some may still exist.

## 2023-03-16 DIAGNOSIS — Z00.00 ANNUAL PHYSICAL EXAM: ICD-10-CM

## 2023-03-16 LAB
25(OH)D3 SERPL-MCNC: 35.4 NG/ML
ALBUMIN SERPL-MCNC: 4.3 G/DL (ref 3.5–5.2)
ALP SERPL-CCNC: 125 U/L (ref 35–104)
ALT SERPL-CCNC: 15 U/L (ref 5–33)
ANION GAP SERPL CALCULATED.3IONS-SCNC: 10 MMOL/L (ref 7–19)
AST SERPL-CCNC: 14 U/L (ref 5–32)
BASOPHILS # BLD: 0.1 K/UL (ref 0–0.2)
BASOPHILS NFR BLD: 1 % (ref 0–1)
BILIRUB SERPL-MCNC: 0.7 MG/DL (ref 0.2–1.2)
BILIRUB UR QL STRIP: NEGATIVE
BUN SERPL-MCNC: 17 MG/DL (ref 6–20)
CALCIUM SERPL-MCNC: 9.6 MG/DL (ref 8.6–10)
CHLORIDE SERPL-SCNC: 101 MMOL/L (ref 98–111)
CHOLEST SERPL-MCNC: 157 MG/DL (ref 160–199)
CLARITY UR: CLEAR
CO2 SERPL-SCNC: 29 MMOL/L (ref 22–29)
COLOR UR: YELLOW
CREAT SERPL-MCNC: 0.6 MG/DL (ref 0.5–0.9)
EOSINOPHIL # BLD: 0.3 K/UL (ref 0–0.6)
EOSINOPHIL NFR BLD: 4.3 % (ref 0–5)
ERYTHROCYTE [DISTWIDTH] IN BLOOD BY AUTOMATED COUNT: 12.3 % (ref 11.5–14.5)
GLUCOSE SERPL-MCNC: 90 MG/DL (ref 74–109)
GLUCOSE UR STRIP.AUTO-MCNC: NEGATIVE MG/DL
HBA1C MFR BLD: 4.8 % (ref 4–6)
HCT VFR BLD AUTO: 44.6 % (ref 37–47)
HDLC SERPL-MCNC: 56 MG/DL (ref 65–121)
HGB BLD-MCNC: 15.2 G/DL (ref 12–16)
HGB UR STRIP.AUTO-MCNC: NEGATIVE MG/L
IMM GRANULOCYTES # BLD: 0 K/UL
KETONES UR STRIP.AUTO-MCNC: NEGATIVE MG/DL
LDLC SERPL CALC-MCNC: 93 MG/DL
LEUKOCYTE ESTERASE UR QL STRIP.AUTO: NEGATIVE
LYMPHOCYTES # BLD: 1.9 K/UL (ref 1.1–4.5)
LYMPHOCYTES NFR BLD: 30.3 % (ref 20–40)
MCH RBC QN AUTO: 29.9 PG (ref 27–31)
MCHC RBC AUTO-ENTMCNC: 34.1 G/DL (ref 33–37)
MCV RBC AUTO: 87.8 FL (ref 81–99)
MONOCYTES # BLD: 0.4 K/UL (ref 0–0.9)
MONOCYTES NFR BLD: 6.4 % (ref 0–10)
NEUTROPHILS # BLD: 3.6 K/UL (ref 1.5–7.5)
NEUTS SEG NFR BLD: 57.8 % (ref 50–65)
NITRITE UR QL STRIP.AUTO: NEGATIVE
PH UR STRIP.AUTO: 6.5 [PH] (ref 5–8)
PLATELET # BLD AUTO: 220 K/UL (ref 130–400)
PMV BLD AUTO: 9.4 FL (ref 9.4–12.3)
POTASSIUM SERPL-SCNC: 4.7 MMOL/L (ref 3.5–5)
PROT SERPL-MCNC: 7.6 G/DL (ref 6.6–8.7)
PROT UR STRIP.AUTO-MCNC: NEGATIVE MG/DL
RBC # BLD AUTO: 5.08 M/UL (ref 4.2–5.4)
SODIUM SERPL-SCNC: 140 MMOL/L (ref 136–145)
SP GR UR STRIP.AUTO: 1.02 (ref 1–1.03)
TRIGL SERPL-MCNC: 39 MG/DL (ref 0–149)
TSH SERPL DL<=0.005 MIU/L-ACNC: 1.66 UIU/ML (ref 0.27–4.2)
UROBILINOGEN UR STRIP.AUTO-MCNC: 0.2 E.U./DL
WBC # BLD AUTO: 6.3 K/UL (ref 4.8–10.8)

## 2023-05-17 ENCOUNTER — OFFICE VISIT (OUTPATIENT)
Dept: PRIMARY CARE CLINIC | Age: 38
End: 2023-05-17
Payer: COMMERCIAL

## 2023-05-17 VITALS
BODY MASS INDEX: 24.89 KG/M2 | OXYGEN SATURATION: 99 % | HEIGHT: 64 IN | WEIGHT: 145.8 LBS | SYSTOLIC BLOOD PRESSURE: 126 MMHG | DIASTOLIC BLOOD PRESSURE: 72 MMHG | HEART RATE: 123 BPM | TEMPERATURE: 98.1 F

## 2023-05-17 DIAGNOSIS — J02.9 PHARYNGITIS, UNSPECIFIED ETIOLOGY: Primary | ICD-10-CM

## 2023-05-17 LAB — S PYO AG THROAT QL: NORMAL

## 2023-05-17 PROCEDURE — 1036F TOBACCO NON-USER: CPT | Performed by: NURSE PRACTITIONER

## 2023-05-17 PROCEDURE — G8427 DOCREV CUR MEDS BY ELIG CLIN: HCPCS | Performed by: NURSE PRACTITIONER

## 2023-05-17 PROCEDURE — 87880 STREP A ASSAY W/OPTIC: CPT | Performed by: NURSE PRACTITIONER

## 2023-05-17 PROCEDURE — G8419 CALC BMI OUT NRM PARAM NOF/U: HCPCS | Performed by: NURSE PRACTITIONER

## 2023-05-17 PROCEDURE — 99213 OFFICE O/P EST LOW 20 MIN: CPT | Performed by: NURSE PRACTITIONER

## 2023-05-17 RX ORDER — AMOXICILLIN AND CLAVULANATE POTASSIUM 875; 125 MG/1; MG/1
1 TABLET, FILM COATED ORAL 2 TIMES DAILY
Qty: 20 TABLET | Refills: 0 | Status: SHIPPED | OUTPATIENT
Start: 2023-05-17 | End: 2023-05-27

## 2023-05-17 ASSESSMENT — ENCOUNTER SYMPTOMS
PHOTOPHOBIA: 0
SHORTNESS OF BREATH: 0
NAUSEA: 0
VOICE CHANGE: 0
BACK PAIN: 0
SORE THROAT: 1
RHINORRHEA: 0
VOMITING: 0
COUGH: 0
COLOR CHANGE: 0

## 2023-05-17 NOTE — PROGRESS NOTES
[address] [facility]  [address] [address] [address]  [address] [address]  Dept: [phone]  Dept Fax: [phone]  Loc: [phone]    Kyle Chou is a 30 y.o. female who presents today for her medical conditions/complaints as noted below. Kyle Chou is c/o of Pharyngitis (Pt stated that throat started hurting last night and was worse today. Stated kids had strep recently /)        HPI:     HPI   Chief Complaint   Patient presents with    Pharyngitis     Pt stated that throat started hurting last night and was worse today. Stated kids had strep recently        Patient presents today for evaluation of sore throat x 2 days. She states her children have recently been treated for strep throat. She denies fever. She has not taken any medications for symptoms. She states she abruptly stopped taking her zoloft 4-5 days ago. She is feeling worn out, nauseated, light-headed.      Past Medical History:   Diagnosis Date    Esophageal reflux 10/10/2017    Gallbladder disease     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     Patient tested positive about two weeks ago from today, 16    Postpartum depression     undiagnosed    Prolactin deficiency ([facility] [address]     Vaginal birth after  () 10/25/2022      Past Surgical History:   Procedure Laterality Date     SECTION N/A 2021     SECTION performed by Cesar Kaye MD at [facility] L&D OR    CHOLECYSTECTOMY, LAPAROSCOPIC N/A 10/13/2016    CHOLECYSTECTOMY LAPAROSCOPIC performed by Trudy Casiano MD at  [address] 3/13/2023 2023 2022 2022    SYSTOLIC 268 [phone]   DIASTOLIC 72 60 68 62 73 80   Site - - Left Upper Arm - Left Upper Arm -   Position - - Sitting - Sitting -   Cuff Size - - Medium Adult - Medium Adult -   Pulse 123 57 - - 89 65   Temp 98.1 97.1 - - - 97.2   Resp - 20 - - - 18   SpO2 99 99 - - - 100   Weight 145 lb
Negative

## 2023-05-22 NOTE — TELEPHONE ENCOUNTER
Pt Called, stated Bharti Graham had ordered her a CT Scan. Insurance wants a ultra sound done first, will not pay for CT scan. If Bharti Graham wants the Ct scan first, has to call her insurance. Pt would like a call back today about this. Detail Level: Detailed Quality 130: Documentation Of Current Medications In The Medical Record: Current Medications Documented Quality 402: Tobacco Use And Help With Quitting Among Adolescents: Patient screened for tobacco and never smoked Quality 110: Preventive Care And Screening: Influenza Immunization: Influenza Immunization Administered during Influenza season

## 2023-12-05 ENCOUNTER — OFFICE VISIT (OUTPATIENT)
Dept: PRIMARY CARE CLINIC | Age: 38
End: 2023-12-05
Payer: COMMERCIAL

## 2023-12-05 VITALS
HEART RATE: 62 BPM | HEIGHT: 64 IN | BODY MASS INDEX: 23.39 KG/M2 | TEMPERATURE: 97.9 F | OXYGEN SATURATION: 99 % | WEIGHT: 137 LBS | DIASTOLIC BLOOD PRESSURE: 74 MMHG | SYSTOLIC BLOOD PRESSURE: 118 MMHG

## 2023-12-05 DIAGNOSIS — J01.90 ACUTE NON-RECURRENT SINUSITIS, UNSPECIFIED LOCATION: Primary | ICD-10-CM

## 2023-12-05 PROCEDURE — G8484 FLU IMMUNIZE NO ADMIN: HCPCS | Performed by: NURSE PRACTITIONER

## 2023-12-05 PROCEDURE — 1036F TOBACCO NON-USER: CPT | Performed by: NURSE PRACTITIONER

## 2023-12-05 PROCEDURE — G8420 CALC BMI NORM PARAMETERS: HCPCS | Performed by: NURSE PRACTITIONER

## 2023-12-05 PROCEDURE — 99213 OFFICE O/P EST LOW 20 MIN: CPT | Performed by: NURSE PRACTITIONER

## 2023-12-05 PROCEDURE — G8427 DOCREV CUR MEDS BY ELIG CLIN: HCPCS | Performed by: NURSE PRACTITIONER

## 2023-12-05 RX ORDER — CLARITHROMYCIN 500 MG/1
500 TABLET, COATED ORAL 2 TIMES DAILY
Qty: 28 TABLET | Refills: 0 | Status: SHIPPED | OUTPATIENT
Start: 2023-12-05 | End: 2023-12-19

## 2023-12-05 RX ORDER — METHYLPREDNISOLONE 4 MG/1
TABLET ORAL
Qty: 1 KIT | Refills: 0 | Status: SHIPPED | OUTPATIENT
Start: 2023-12-05

## 2023-12-05 RX ORDER — FLUCONAZOLE 150 MG/1
150 TABLET ORAL ONCE
Qty: 1 TABLET | Refills: 0 | Status: SHIPPED | OUTPATIENT
Start: 2023-12-05 | End: 2023-12-05

## 2023-12-05 ASSESSMENT — ENCOUNTER SYMPTOMS
PHOTOPHOBIA: 0
SINUS PAIN: 1
SHORTNESS OF BREATH: 0
COUGH: 0
NAUSEA: 0
RHINORRHEA: 0
SINUS PRESSURE: 1
BACK PAIN: 0
VOMITING: 0
COLOR CHANGE: 0
VOICE CHANGE: 0

## 2023-12-05 NOTE — PATIENT INSTRUCTIONS
Biaxin twice daily for 10 days. Medrol dose pack as directed. Mucinex/sudafed as needed. Take diflucan x 1 dose if yeast infection occurs. Follow-up if symptoms fail to improve.

## 2024-02-15 ENCOUNTER — OFFICE VISIT (OUTPATIENT)
Dept: OBGYN CLINIC | Age: 39
End: 2024-02-15
Payer: COMMERCIAL

## 2024-02-15 VITALS
HEART RATE: 72 BPM | DIASTOLIC BLOOD PRESSURE: 73 MMHG | WEIGHT: 136 LBS | SYSTOLIC BLOOD PRESSURE: 103 MMHG | HEIGHT: 64 IN | BODY MASS INDEX: 23.22 KG/M2

## 2024-02-15 DIAGNOSIS — Z01.419 WELL WOMAN EXAM: Primary | ICD-10-CM

## 2024-02-15 PROCEDURE — G8484 FLU IMMUNIZE NO ADMIN: HCPCS | Performed by: ADVANCED PRACTICE MIDWIFE

## 2024-02-15 PROCEDURE — 99395 PREV VISIT EST AGE 18-39: CPT | Performed by: ADVANCED PRACTICE MIDWIFE

## 2024-02-15 RX ORDER — FLUTICASONE PROPIONATE 50 MCG
1 SPRAY, SUSPENSION (ML) NASAL DAILY
COMMUNITY

## 2024-02-15 NOTE — PATIENT INSTRUCTIONS
doctor about steps you can take to stay healthy or improve your health. You may need to make lifestyle changes to gain or maintain weight and stay healthy, such as getting more healthy foods in your diet and doing exercises to build muscle.  Where can you learn more?  Go to https://www.GraphOn.net/patientEd and enter S176 to learn more about \"Body Mass Index: Care Instructions.\"  Current as of: May 14, 2023               Content Version: 13.9  © 2006-2023 [employer].   Care instructions adapted under license by [employer]. If you have questions about a medical condition or this instruction, always ask your healthcare professional. [employer] disclaims any warranty or liability for your use of this information.       Patient Education        A Healthy Lifestyle: Care Instructions  A healthy lifestyle can help you feel good, have more energy, and stay at a weight that's healthy for you. You can share a healthy lifestyle with your friends and family. And you can do it on your own.    Eat meals with your friends or family. You could try cooking together.   Plan activities with other people. Go for a walk with a friend, try a free online fitness class, or join a sports league.     Eat a variety of healthy foods. These include fruits, vegetables, whole grains, low-fat dairy, and lean protein.   Choose healthy portions of food. You can use the Nutrition Facts label on food packages as a guide.     Eat more fruits and vegetables. You could add vegetables to sandwiches or add fruit to cereal.   Drink water when you are thirsty. Limit soda, juice, and sports drinks.     Try to exercise most days. Aim for at least 2½ hours of exercise each week.   Keep moving. Work in the garden or take your dog on a walk. Use the stairs instead of the elevator.     If you use tobacco or nicotine, try to quit. Ask your doctor about programs and medicines to help you quit.   Limit alcohol. Men should have no

## 2024-02-15 NOTE — PROGRESS NOTES
Pt presents today for pap smear and breast exam.     Last mammogram:  na  Last pap smear:  23  Contraception:  na  :  3  Para:  3  AB:    Last bone density:na    Last colonoscopy: na

## 2024-02-15 NOTE — PROGRESS NOTES
[facility]  CNM Office Note    Justina Morgan is a 38 y.o. female who presents today for her medical conditions/ complaints as noted below.  Chief Complaint   Patient presents with    Well Woman Visit       HPI  Dahlia presents for her annual GYN exam.  She had a co test last year and declines pap this year. Her menses is regular. She doesn't have any sexual concerns. Her  has scheduled consult for vasectomy.            Last mammogram:  na  Last pap smear:  23  Contraception:  na  :  3  Para:  3  AB:    Last bone density:na    Last colonoscopy: na      Problems/Complaints today:  1. Encounter for cervical Pap smear with pelvic exam  -     PAP SMEAR  2. Encounter for screening for human papillomavirus (HPV)  -     Human papillomavirus (HPV) DNA Probe Thin Prep High Risk  3. Encounter for screening for cervical cancer  -     PAP SMEAR  -     Human papillomavirus (HPV) DNA Probe Thin Prep High Risk       Patient Active Problem List   Diagnosis    Biliary dyskinesia       Patient's last menstrual period was 2024.      Past Medical History:   Diagnosis Date    Esophageal reflux 10/10/2017    Gallbladder disease     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     Patient tested positive about two weeks ago from today, 16    Postpartum depression     undiagnosed    Prolactin deficiency (HCC)     Vaginal birth after  () 10/25/2022     Past Surgical History:   Procedure Laterality Date     SECTION N/A 2021     SECTION performed by Emma Gruber MD at [facility] L&D OR    CHOLECYSTECTOMY, LAPAROSCOPIC N/A 10/13/2016    CHOLECYSTECTOMY LAPAROSCOPIC performed by Eitan Ballesteros MD at [facility] OR    MOUTH SURGERY      TYMPANOSTOMY TUBE PLACEMENT       Family History   Problem Relation Age of Onset    Hypertension Mother     Diabetes Father     Hypertension Father     Alzheimer's Disease Maternal Grandmother     Diabetes Maternal Grandmother     Heart Disease Maternal Grandfather

## 2024-03-14 ENCOUNTER — OFFICE VISIT (OUTPATIENT)
Dept: PRIMARY CARE CLINIC | Age: 39
End: 2024-03-14
Payer: COMMERCIAL

## 2024-03-14 VITALS
DIASTOLIC BLOOD PRESSURE: 78 MMHG | OXYGEN SATURATION: 99 % | WEIGHT: 138.2 LBS | BODY MASS INDEX: 23.6 KG/M2 | SYSTOLIC BLOOD PRESSURE: 112 MMHG | HEART RATE: 62 BPM | HEIGHT: 64 IN | TEMPERATURE: 97 F

## 2024-03-14 DIAGNOSIS — Z00.00 ENCOUNTER FOR WELL ADULT EXAM WITHOUT ABNORMAL FINDINGS: Primary | ICD-10-CM

## 2024-03-14 DIAGNOSIS — L65.9 HAIR LOSS: ICD-10-CM

## 2024-03-14 PROCEDURE — 99395 PREV VISIT EST AGE 18-39: CPT | Performed by: NURSE PRACTITIONER

## 2024-03-14 PROCEDURE — G8420 CALC BMI NORM PARAMETERS: HCPCS | Performed by: NURSE PRACTITIONER

## 2024-03-14 PROCEDURE — 99213 OFFICE O/P EST LOW 20 MIN: CPT | Performed by: NURSE PRACTITIONER

## 2024-03-14 PROCEDURE — G8484 FLU IMMUNIZE NO ADMIN: HCPCS | Performed by: NURSE PRACTITIONER

## 2024-03-14 PROCEDURE — G8427 DOCREV CUR MEDS BY ELIG CLIN: HCPCS | Performed by: NURSE PRACTITIONER

## 2024-03-14 PROCEDURE — 1036F TOBACCO NON-USER: CPT | Performed by: NURSE PRACTITIONER

## 2024-03-14 SDOH — ECONOMIC STABILITY: FOOD INSECURITY: WITHIN THE PAST 12 MONTHS, THE FOOD YOU BOUGHT JUST DIDN'T LAST AND YOU DIDN'T HAVE MONEY TO GET MORE.: NEVER TRUE

## 2024-03-14 SDOH — ECONOMIC STABILITY: FOOD INSECURITY: WITHIN THE PAST 12 MONTHS, YOU WORRIED THAT YOUR FOOD WOULD RUN OUT BEFORE YOU GOT MONEY TO BUY MORE.: NEVER TRUE

## 2024-03-14 SDOH — ECONOMIC STABILITY: INCOME INSECURITY: HOW HARD IS IT FOR YOU TO PAY FOR THE VERY BASICS LIKE FOOD, HOUSING, MEDICAL CARE, AND HEATING?: NOT HARD AT ALL

## 2024-03-14 ASSESSMENT — ENCOUNTER SYMPTOMS
PHOTOPHOBIA: 0
COLOR CHANGE: 0
VOICE CHANGE: 0
SHORTNESS OF BREATH: 0
BACK PAIN: 0
NAUSEA: 0
RHINORRHEA: 0
COUGH: 0
VOMITING: 0

## 2024-03-14 ASSESSMENT — PATIENT HEALTH QUESTIONNAIRE - PHQ9
SUM OF ALL RESPONSES TO PHQ QUESTIONS 1-9: 0
SUM OF ALL RESPONSES TO PHQ QUESTIONS 1-9: 0
2. FEELING DOWN, DEPRESSED OR HOPELESS: 0
1. LITTLE INTEREST OR PLEASURE IN DOING THINGS: 0
SUM OF ALL RESPONSES TO PHQ QUESTIONS 1-9: 0
SUM OF ALL RESPONSES TO PHQ QUESTIONS 1-9: 0
SUM OF ALL RESPONSES TO PHQ9 QUESTIONS 1 & 2: 0

## 2024-03-14 NOTE — PROGRESS NOTES
[facility]  [address]  [address]  [place] [address]  Dept: [phone]  Dept Fax: [phone]  Loc: [phone]    Justina Morgan is a 38 y.o. female who presents today for her medical conditions/complaints as noted below.  Justina Morgan is c/o of Annual Exam (Pt in office for annual exam - no new issues or concerns)        HPI:     HPI   Chief Complaint   Patient presents with    Annual Exam     Pt in office for annual exam - no new issues or concerns     Patient presents today for annual physical. She had GYN appointment last month; she declined pap. She is due for fasting labs. She complains of recent hairloss; no balding. She denies any recent trauma/illness. No other symptoms.     Past Medical History:   Diagnosis Date    Esophageal reflux 10/10/2017    Gallbladder disease     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     Patient tested positive about two weeks ago from today, 16    Postpartum depression     undiagnosed    Prolactin deficiency (HCC)     Vaginal birth after  () 10/25/2022      Past Surgical History:   Procedure Laterality Date     SECTION N/A 2021     SECTION performed by Emma Gruber MD at [facility] L&D OR    CHOLECYSTECTOMY, LAPAROSCOPIC N/A 10/13/2016    CHOLECYSTECTOMY LAPAROSCOPIC performed by Eitan Ballesteros MD at [facility] OR    MOUTH SURGERY      TYMPANOSTOMY TUBE PLACEMENT             3/14/2024     8:31 AM 2/15/2024     8:50 AM 2023     8:49 AM 2023     2:27 PM 3/13/2023     8:16 AM 2023     8:37 AM   Vitals   SYSTOLIC 112 103 118 126 114 100   DIASTOLIC 78 73 74 72 60 68   Site  Left Upper Arm    Left Upper Arm   Position  Sitting    Sitting   Cuff Size      Medium Adult   Pulse 62 72 62 123 57    Temp 97 °F (36.1 °C)  97.9 °F (36.6 °C) 98.1 °F (36.7 °C) 97.1 °F (36.2 °C)    Respirations     20    SpO2 99 %  99 % 99 % 99 %    Weight - Scale 138 lb 3.2 oz 136 lb 137 lb 145 lb 12.8 oz 138 lb 139 lb   Height

## 2024-03-18 DIAGNOSIS — Z00.00 ENCOUNTER FOR WELL ADULT EXAM WITHOUT ABNORMAL FINDINGS: ICD-10-CM

## 2024-03-18 DIAGNOSIS — L65.9 HAIR LOSS: ICD-10-CM

## 2024-03-18 LAB
25(OH)D3 SERPL-MCNC: 15.8 NG/ML
ALBUMIN SERPL-MCNC: 4.3 G/DL (ref 3.5–5.2)
ALP SERPL-CCNC: 69 U/L (ref 35–104)
ALT SERPL-CCNC: 10 U/L (ref 5–33)
ANION GAP SERPL CALCULATED.3IONS-SCNC: 9 MMOL/L (ref 7–19)
AST SERPL-CCNC: 15 U/L (ref 5–32)
BASOPHILS # BLD: 0.1 K/UL (ref 0–0.2)
BASOPHILS NFR BLD: 0.9 % (ref 0–1)
BILIRUB SERPL-MCNC: 0.5 MG/DL (ref 0.2–1.2)
BUN SERPL-MCNC: 12 MG/DL (ref 6–20)
CALCIUM SERPL-MCNC: 8.8 MG/DL (ref 8.6–10)
CHLORIDE SERPL-SCNC: 105 MMOL/L (ref 98–111)
CHOLEST SERPL-MCNC: 154 MG/DL (ref 160–199)
CO2 SERPL-SCNC: 27 MMOL/L (ref 22–29)
CREAT SERPL-MCNC: 0.6 MG/DL (ref 0.5–0.9)
EOSINOPHIL # BLD: 0.2 K/UL (ref 0–0.6)
EOSINOPHIL NFR BLD: 3 % (ref 0–5)
ERYTHROCYTE [DISTWIDTH] IN BLOOD BY AUTOMATED COUNT: 14.4 % (ref 11.5–14.5)
GLUCOSE SERPL-MCNC: 97 MG/DL (ref 74–109)
HBA1C MFR BLD: 5 % (ref 4–6)
HCT VFR BLD AUTO: 41.4 % (ref 37–47)
HDLC SERPL-MCNC: 49 MG/DL (ref 65–121)
HGB BLD-MCNC: 12.9 G/DL (ref 12–16)
IMM GRANULOCYTES # BLD: 0 K/UL
LDLC SERPL CALC-MCNC: 93 MG/DL
LYMPHOCYTES # BLD: 1.8 K/UL (ref 1.1–4.5)
LYMPHOCYTES NFR BLD: 34 % (ref 20–40)
MCH RBC QN AUTO: 25 PG (ref 27–31)
MCHC RBC AUTO-ENTMCNC: 31.2 G/DL (ref 33–37)
MCV RBC AUTO: 80.4 FL (ref 81–99)
MONOCYTES # BLD: 0.3 K/UL (ref 0–0.9)
MONOCYTES NFR BLD: 6.3 % (ref 0–10)
NEUTROPHILS # BLD: 3 K/UL (ref 1.5–7.5)
NEUTS SEG NFR BLD: 55.6 % (ref 50–65)
PLATELET # BLD AUTO: 256 K/UL (ref 130–400)
PMV BLD AUTO: 9.2 FL (ref 9.4–12.3)
POTASSIUM SERPL-SCNC: 4.4 MMOL/L (ref 3.5–5)
PROT SERPL-MCNC: 7.1 G/DL (ref 6.6–8.7)
RBC # BLD AUTO: 5.15 M/UL (ref 4.2–5.4)
SODIUM SERPL-SCNC: 141 MMOL/L (ref 136–145)
T3FREE SERPL-MCNC: 3.1 PG/ML (ref 2–4.4)
TRIGL SERPL-MCNC: 61 MG/DL (ref 0–149)
TSH SERPL DL<=0.005 MIU/L-ACNC: 1.65 UIU/ML (ref 0.27–4.2)
WBC # BLD AUTO: 5.4 K/UL (ref 4.8–10.8)

## 2024-03-19 RX ORDER — ERGOCALCIFEROL 1.25 MG/1
50000 CAPSULE ORAL WEEKLY
Qty: 12 CAPSULE | Refills: 1 | Status: SHIPPED | OUTPATIENT
Start: 2024-03-19

## 2024-03-19 NOTE — TELEPHONE ENCOUNTER
Justina Morgan called to request a refill on her medication.      Last office visit : 3/14/2024   Next office visit : Visit date not found     Requested Prescriptions     Pending Prescriptions Disp Refills    vitamin D (ERGOCALCIFEROL) 1.25 MG (98431 UT) CAPS capsule 12 capsule 1     Sig: Take 1 capsule by mouth once a week            Heather Bradford MA

## 2024-08-20 RX ORDER — ERGOCALCIFEROL 1.25 MG/1
50000 CAPSULE ORAL WEEKLY
Qty: 12 CAPSULE | Refills: 1 | Status: SHIPPED | OUTPATIENT
Start: 2024-08-20

## 2024-11-05 ENCOUNTER — TELEPHONE (OUTPATIENT)
Dept: PRIMARY CARE CLINIC | Age: 39
End: 2024-11-05

## 2024-11-05 NOTE — TELEPHONE ENCOUNTER
It looks like initially it was billed as physical and problem visit (which is typically how it is done) but acute modifier not attached and this was corrected to just a physical visit without acute visit charge. Likely needs to be resubmitted for billing to be modified.

## 2024-11-05 NOTE — TELEPHONE ENCOUNTER
Pt called and left vm concerned with billing of recent appt. Stated that she was billed for a separate appt due to asking about hair loss during her physical and would like to know if this is the case and why.

## 2025-02-13 RX ORDER — ERGOCALCIFEROL 1.25 MG/1
50000 CAPSULE, LIQUID FILLED ORAL WEEKLY
Qty: 12 CAPSULE | Refills: 1 | Status: SHIPPED | OUTPATIENT
Start: 2025-02-13

## 2025-02-13 NOTE — TELEPHONE ENCOUNTER
Justina MCNEIL Maria Esthertony called to request a refill on her medication.      Last office visit : 3/14/2024   Next office visit : 3/24/2025     Requested Prescriptions     Signed Prescriptions Disp Refills    vitamin D (ERGOCALCIFEROL) 1.25 MG (03771 UT) CAPS capsule 12 capsule 1     Sig: TAKE 1 CAPSULE BY MOUTH ONE TIME PER WEEK     Authorizing Provider: TEX FELIZ     Ordering User: KAROLINA COLLINS LPN

## 2025-03-14 ENCOUNTER — OFFICE VISIT (OUTPATIENT)
Age: 40
End: 2025-03-14
Payer: COMMERCIAL

## 2025-03-14 VITALS
SYSTOLIC BLOOD PRESSURE: 128 MMHG | HEART RATE: 124 BPM | RESPIRATION RATE: 20 BRPM | DIASTOLIC BLOOD PRESSURE: 74 MMHG | OXYGEN SATURATION: 98 % | WEIGHT: 146.8 LBS | BODY MASS INDEX: 25.19 KG/M2 | TEMPERATURE: 97.7 F

## 2025-03-14 DIAGNOSIS — L30.9 ECZEMA, UNSPECIFIED TYPE: ICD-10-CM

## 2025-03-14 DIAGNOSIS — L08.9 SKIN INFECTION: Primary | ICD-10-CM

## 2025-03-14 DIAGNOSIS — R21 RASH: ICD-10-CM

## 2025-03-14 PROCEDURE — 96372 THER/PROPH/DIAG INJ SC/IM: CPT

## 2025-03-14 PROCEDURE — G8427 DOCREV CUR MEDS BY ELIG CLIN: HCPCS

## 2025-03-14 PROCEDURE — 1036F TOBACCO NON-USER: CPT

## 2025-03-14 PROCEDURE — G8419 CALC BMI OUT NRM PARAM NOF/U: HCPCS

## 2025-03-14 PROCEDURE — 99213 OFFICE O/P EST LOW 20 MIN: CPT

## 2025-03-14 RX ORDER — CEPHALEXIN 500 MG/1
500 CAPSULE ORAL 2 TIMES DAILY
Qty: 20 CAPSULE | Refills: 0 | Status: SHIPPED | OUTPATIENT
Start: 2025-03-14 | End: 2025-03-24

## 2025-03-14 RX ORDER — CEFTRIAXONE 1 G/1
1000 INJECTION, POWDER, FOR SOLUTION INTRAMUSCULAR; INTRAVENOUS ONCE
Status: COMPLETED | OUTPATIENT
Start: 2025-03-14 | End: 2025-03-14

## 2025-03-14 RX ORDER — MUPIROCIN 20 MG/G
OINTMENT TOPICAL
Qty: 15 G | Refills: 0 | Status: SHIPPED | OUTPATIENT
Start: 2025-03-14

## 2025-03-14 RX ORDER — TRIAMCINOLONE ACETONIDE 1 MG/G
CREAM TOPICAL 3 TIMES DAILY
Qty: 80 G | Refills: 0 | Status: SHIPPED | OUTPATIENT
Start: 2025-03-14 | End: 2025-03-24

## 2025-03-14 RX ORDER — DEXAMETHASONE SODIUM PHOSPHATE 10 MG/ML
8 INJECTION, SOLUTION INTRA-ARTICULAR; INTRALESIONAL; INTRAMUSCULAR; INTRAVENOUS; SOFT TISSUE ONCE
Status: COMPLETED | OUTPATIENT
Start: 2025-03-14 | End: 2025-03-14

## 2025-03-14 RX ADMIN — CEFTRIAXONE 1000 MG: 1 INJECTION, POWDER, FOR SOLUTION INTRAMUSCULAR; INTRAVENOUS at 16:44

## 2025-03-14 RX ADMIN — DEXAMETHASONE SODIUM PHOSPHATE 8 MG: 10 INJECTION, SOLUTION INTRA-ARTICULAR; INTRALESIONAL; INTRAMUSCULAR; INTRAVENOUS; SOFT TISSUE at 16:46

## 2025-03-14 ASSESSMENT — ENCOUNTER SYMPTOMS
COUGH: 0
ABDOMINAL DISTENTION: 0
RHINORRHEA: 0
NAUSEA: 0
EYE ITCHING: 0
SINUS PRESSURE: 0
EYE DISCHARGE: 0
EYE PAIN: 0
ABDOMINAL PAIN: 0
SHORTNESS OF BREATH: 0
SORE THROAT: 0
VOMITING: 0
WHEEZING: 0
COLOR CHANGE: 0
CHEST TIGHTNESS: 0
TROUBLE SWALLOWING: 0
CONSTIPATION: 0
SINUS PAIN: 0
DIARRHEA: 0
APNEA: 0

## 2025-03-14 NOTE — PROGRESS NOTES
[facility]  [facility]  [address] [place] [address] [place] [address]  Dept: [phone]  Dept Fax: [phone]  Loc: [phone]    Justina Morgan is a 39 y.o. female who presents today for her medical conditions/complaints as noted below.  Justina Morgan is c/o of Ear Drainage (Left ear) and Postules (Left and right hand, left hand worse. Puss coming out of postules)        HPI:     Justina Morgan presents with complaints of drainage from her left ear. Patient states her symptoms started within the last couple of days and it painful. She states she has a history of getting fluid in her ears because she constantly wears earphones and she has been wearing them only in her right ear this week. She denies taking any OTC medications. Drainage has been clear. No other symptoms including fever, sore throat, or congestion.     Patient is also complaining of blisters/sores and rash on various parts of her body, but mostly on her hands. The rash started throughout the winter as just \"dry hands\" which she states she gets every winter. Then she got her ring cleaned a couple of weeks ago and it began irritating her left hand. So within the last week she has stopped wearing her wedding ring on her left hand (moved it to her right) and the rash is pustules and having drainage. She also has a rash on her wrists, inner elbows, and neck but it is erythematous, flat, and rough as if it is eczema. Patient states she has a history of this type of rash. She denies any fever or chills. The rash on her hands is worse on her left hand (especially her thumb), but mild pustules on all her fingers and on her right thumb and middle finger. She does not appear in distress. She states the rash is mildly painful and she doesn't feel good, but she is stable.    Denies any recent antibiotic or steroid administration.      Past Medical History:   Diagnosis Date    Esophageal reflux 10/10/2017

## 2025-03-14 NOTE — PATIENT INSTRUCTIONS
- Rocephin shot given in office.  - Steroid shot given in office. Risks/benefits discussed with patient and patient gave consent.  - Triamcinolone cream sent to pharmacy; apply to wrists, neck, and inner elbows.   - Keflex sent to pharmacy; take as directed.  - Bactroban sent to pharmacy; apply as directed.  - Keep the rash clean and covered.   - Avoid contact with irritants.  - Use a cool washcloth for relief.   - Alternate motrin/tylenol as needed for pain relief.  - If symptoms persist or you begin running fever or having chills, go to the ER.

## 2025-03-14 NOTE — PROGRESS NOTES
Medication was administered by Leann Chatman MA at 4:47 PM.    Medication: dexamethasone  Amount: 8mg  Route: intramuscular  Site: Dorsogluteal left    Patient tolerated well.Medication was administered by Leann Chatman MA at 4:45 PM.    Medication: ceftriaxone(rocephin)  Amount:  1000mg (2.86ml)  Route: intramuscular  Site: Dorsogluteal right    Patient tolerated well.

## 2025-03-17 DIAGNOSIS — Z00.00 ROUTINE ADULT HEALTH MAINTENANCE: Primary | ICD-10-CM

## 2025-03-17 DIAGNOSIS — E55.9 VITAMIN D DEFICIENCY: ICD-10-CM

## 2025-03-18 ENCOUNTER — RESULTS FOLLOW-UP (OUTPATIENT)
Dept: PRIMARY CARE CLINIC | Age: 40
End: 2025-03-18

## 2025-03-18 DIAGNOSIS — E55.9 VITAMIN D DEFICIENCY: ICD-10-CM

## 2025-03-18 DIAGNOSIS — Z00.00 ROUTINE ADULT HEALTH MAINTENANCE: ICD-10-CM

## 2025-03-18 LAB
25(OH)D3 SERPL-MCNC: 77.6 NG/ML
ALBUMIN SERPL-MCNC: 3.9 G/DL (ref 3.5–5.2)
ALP SERPL-CCNC: 76 U/L (ref 35–104)
ALT SERPL-CCNC: 18 U/L (ref 10–35)
ANION GAP SERPL CALCULATED.3IONS-SCNC: 7 MMOL/L (ref 8–16)
AST SERPL-CCNC: 15 U/L (ref 10–35)
BASOPHILS # BLD: 0.1 K/UL (ref 0–0.2)
BASOPHILS NFR BLD: 0.7 % (ref 0–1)
BILIRUB SERPL-MCNC: 0.3 MG/DL (ref 0.2–1.2)
BUN SERPL-MCNC: 15 MG/DL (ref 6–20)
CALCIUM SERPL-MCNC: 9 MG/DL (ref 8.6–10)
CHLORIDE SERPL-SCNC: 104 MMOL/L (ref 98–107)
CHOLEST SERPL-MCNC: 139 MG/DL (ref 0–199)
CO2 SERPL-SCNC: 29 MMOL/L (ref 22–29)
CREAT SERPL-MCNC: 0.7 MG/DL (ref 0.5–0.9)
EOSINOPHIL # BLD: 0.1 K/UL (ref 0–0.6)
EOSINOPHIL NFR BLD: 2.1 % (ref 0–5)
ERYTHROCYTE [DISTWIDTH] IN BLOOD BY AUTOMATED COUNT: 13.1 % (ref 11.5–14.5)
GLUCOSE SERPL-MCNC: 97 MG/DL (ref 70–99)
HBA1C MFR BLD: 5.2 % (ref 4–5.6)
HCT VFR BLD AUTO: 40.8 % (ref 37–47)
HDLC SERPL-MCNC: 33 MG/DL (ref 40–60)
HGB BLD-MCNC: 13.2 G/DL (ref 12–16)
IMM GRANULOCYTES # BLD: 0 K/UL
LDLC SERPL CALC-MCNC: 91 MG/DL
LYMPHOCYTES # BLD: 2.3 K/UL (ref 1.1–4.5)
LYMPHOCYTES NFR BLD: 34.4 % (ref 20–40)
MCH RBC QN AUTO: 26.4 PG (ref 27–31)
MCHC RBC AUTO-ENTMCNC: 32.4 G/DL (ref 33–37)
MCV RBC AUTO: 81.6 FL (ref 81–99)
MONOCYTES # BLD: 0.5 K/UL (ref 0–0.9)
MONOCYTES NFR BLD: 7.2 % (ref 0–10)
NEUTROPHILS # BLD: 3.7 K/UL (ref 1.5–7.5)
NEUTS SEG NFR BLD: 55.3 % (ref 50–65)
PLATELET # BLD AUTO: 291 K/UL (ref 130–400)
PMV BLD AUTO: 9.2 FL (ref 9.4–12.3)
POTASSIUM SERPL-SCNC: 4.9 MMOL/L (ref 3.5–5.1)
PROT SERPL-MCNC: 7.1 G/DL (ref 6.4–8.3)
RBC # BLD AUTO: 5 M/UL (ref 4.2–5.4)
SODIUM SERPL-SCNC: 140 MMOL/L (ref 136–145)
T3FREE SERPL-MCNC: 3.8 PG/ML (ref 2–4.4)
TRIGL SERPL-MCNC: 77 MG/DL (ref 0–149)
TSH SERPL DL<=0.005 MIU/L-ACNC: 1.85 UIU/ML (ref 0.27–4.2)
WBC # BLD AUTO: 6.8 K/UL (ref 4.8–10.8)

## 2025-03-24 ENCOUNTER — OFFICE VISIT (OUTPATIENT)
Dept: PRIMARY CARE CLINIC | Age: 40
End: 2025-03-24
Payer: COMMERCIAL

## 2025-03-24 VITALS
HEIGHT: 64 IN | BODY MASS INDEX: 24.82 KG/M2 | OXYGEN SATURATION: 99 % | HEART RATE: 85 BPM | DIASTOLIC BLOOD PRESSURE: 74 MMHG | WEIGHT: 145.4 LBS | SYSTOLIC BLOOD PRESSURE: 108 MMHG

## 2025-03-24 DIAGNOSIS — Z00.00 ENCOUNTER FOR WELL ADULT EXAM WITHOUT ABNORMAL FINDINGS: Primary | ICD-10-CM

## 2025-03-24 PROCEDURE — 99395 PREV VISIT EST AGE 18-39: CPT | Performed by: NURSE PRACTITIONER

## 2025-03-24 SDOH — ECONOMIC STABILITY: FOOD INSECURITY: WITHIN THE PAST 12 MONTHS, YOU WORRIED THAT YOUR FOOD WOULD RUN OUT BEFORE YOU GOT MONEY TO BUY MORE.: NEVER TRUE

## 2025-03-24 SDOH — ECONOMIC STABILITY: FOOD INSECURITY: WITHIN THE PAST 12 MONTHS, THE FOOD YOU BOUGHT JUST DIDN'T LAST AND YOU DIDN'T HAVE MONEY TO GET MORE.: NEVER TRUE

## 2025-03-24 ASSESSMENT — PATIENT HEALTH QUESTIONNAIRE - PHQ9
SUM OF ALL RESPONSES TO PHQ QUESTIONS 1-9: 1
SUM OF ALL RESPONSES TO PHQ9 QUESTIONS 1 & 2: 1
1. LITTLE INTEREST OR PLEASURE IN DOING THINGS: NOT AT ALL
1. LITTLE INTEREST OR PLEASURE IN DOING THINGS: NOT AT ALL
SUM OF ALL RESPONSES TO PHQ QUESTIONS 1-9: 1
SUM OF ALL RESPONSES TO PHQ QUESTIONS 1-9: 1
2. FEELING DOWN, DEPRESSED OR HOPELESS: SEVERAL DAYS
SUM OF ALL RESPONSES TO PHQ QUESTIONS 1-9: 1
2. FEELING DOWN, DEPRESSED OR HOPELESS: SEVERAL DAYS

## 2025-03-24 ASSESSMENT — ENCOUNTER SYMPTOMS
SHORTNESS OF BREATH: 0
NAUSEA: 0
RHINORRHEA: 0
COLOR CHANGE: 0
COUGH: 0
BACK PAIN: 0
VOICE CHANGE: 0
PHOTOPHOBIA: 0
VOMITING: 0

## 2025-03-24 NOTE — PROGRESS NOTES
chest pain and palpitations.   Gastrointestinal:  Negative for nausea and vomiting.   Endocrine: Negative.  Negative for cold intolerance and heat intolerance.   Genitourinary:  Negative for difficulty urinating and flank pain.   Musculoskeletal:  Negative for back pain and neck pain.   Skin:  Negative for color change and rash.   Allergic/Immunologic: Negative for environmental allergies and food allergies.   Neurological:  Negative for dizziness, speech difficulty and headaches.   Hematological:  Does not bruise/bleed easily.   Psychiatric/Behavioral:  Negative for sleep disturbance and suicidal ideas.        Objective:     Physical Exam  Vitals and nursing note reviewed.   Constitutional:       Appearance: She is well-developed.   HENT:      Head: Atraumatic.      Right Ear: External ear normal.      Left Ear: External ear normal.      Nose: Nose normal.   Eyes:      Conjunctiva/sclera: Conjunctivae normal.      Pupils: Pupils are equal, round, and reactive to light.   Cardiovascular:      Rate and Rhythm: Normal rate and regular rhythm.      Heart sounds: Normal heart sounds, S1 normal and S2 normal.   Pulmonary:      Effort: Pulmonary effort is normal.      Breath sounds: Normal breath sounds.   Abdominal:      General: Bowel sounds are normal.      Palpations: Abdomen is soft.   Musculoskeletal:         General: Normal range of motion.      Cervical back: Normal range of motion and neck supple.   Skin:     General: Skin is warm and dry.   Neurological:      Mental Status: She is alert and oriented to person, place, and time.   Psychiatric:         Behavior: Behavior normal.       /74   Pulse 85   Ht 1.626 m (5' 4.02\")   Wt 66 kg (145 lb 6.4 oz)   LMP 03/11/2025 (Approximate)   SpO2 99%   BMI 24.95 kg/m²     Assessment:   Assessment & Plan    Diagnosis Orders   1. Encounter for well adult exam without abnormal findings  CBC with Auto Differential    Comprehensive Metabolic Panel    Hemoglobin A1C

## 2025-04-17 ENCOUNTER — OFFICE VISIT (OUTPATIENT)
Dept: OBGYN CLINIC | Age: 40
End: 2025-04-17
Payer: COMMERCIAL

## 2025-04-17 VITALS
WEIGHT: 143 LBS | HEART RATE: 67 BPM | BODY MASS INDEX: 24.53 KG/M2 | SYSTOLIC BLOOD PRESSURE: 113 MMHG | DIASTOLIC BLOOD PRESSURE: 76 MMHG

## 2025-04-17 DIAGNOSIS — N94.3 PMS (PREMENSTRUAL SYNDROME): ICD-10-CM

## 2025-04-17 DIAGNOSIS — Z12.4 SCREENING FOR CERVICAL CANCER: ICD-10-CM

## 2025-04-17 DIAGNOSIS — Z01.419 WELL WOMAN EXAM WITH ROUTINE GYNECOLOGICAL EXAM: Primary | ICD-10-CM

## 2025-04-17 DIAGNOSIS — Z12.39 ENCOUNTER FOR SCREENING BREAST EXAMINATION: ICD-10-CM

## 2025-04-17 LAB — HPV16+18+H RISK 12 DNA SPEC-IMP: NORMAL

## 2025-04-17 PROCEDURE — 99395 PREV VISIT EST AGE 18-39: CPT | Performed by: NURSE PRACTITIONER

## 2025-04-17 ASSESSMENT — ENCOUNTER SYMPTOMS
CONSTIPATION: 0
ALLERGIC/IMMUNOLOGIC NEGATIVE: 1
DIARRHEA: 0
GASTROINTESTINAL NEGATIVE: 1
EYES NEGATIVE: 1
RESPIRATORY NEGATIVE: 1

## 2025-04-17 NOTE — PROGRESS NOTES
Justina Morgan is a 39 y.o. female who presents today for her medical conditions/ complaints as noted below. Justina Morgan is c/o of Annual Exam        HPI  Pt presents for well woman exam and pap smear. Periods have been regular, give or take one week. Heavier than before she had her children. PMS seems to be worse. Contributes it to increase in stress at home and work. Has 3 small children and also works full time. Symptoms of irritability are always before her period. Has taken OCP in the past, in high school, and did not do well with mood swings. Also has taken mood medication for PPD and was able to wean off without problems. Does not feel like she needs something \"all the time\" but interested in natural supplements for PMS.     Mammo:NA  Pap smear:  Contraception: vasectomy  G:3  P:3  Ab:0  Bone density:NA  Colonoscopy:NA  Patient's last menstrual period was 2025 (exact date).      Past Medical History:   Diagnosis Date    Esophageal reflux 10/10/2017    Gallbladder disease     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     Patient tested positive about two weeks ago from today, 16    Postpartum depression     undiagnosed    Prolactin deficiency     Vaginal birth after  () 10/25/2022     Past Surgical History:   Procedure Laterality Date     SECTION N/A 2021     SECTION performed by Emma Gruber MD at [facility] OR    CHOLECYSTECTOMY, LAPAROSCOPIC N/A 10/13/2016    CHOLECYSTECTOMY LAPAROSCOPIC performed by Eitan Ballesteros MD at [facility] OR    MOUTH SURGERY      TYMPANOSTOMY TUBE PLACEMENT       Family History   Problem Relation Age of Onset    Hypertension Mother     Diabetes Father     Hypertension Father     Alzheimer's Disease Maternal Grandmother     Diabetes Maternal Grandmother     Heart Disease Maternal Grandfather      Social History     Tobacco Use    Smoking status: Never    Smokeless tobacco: Never   Substance Use Topics    Alcohol use: No       Current Outpatient

## 2025-04-17 NOTE — PROGRESS NOTES
Pt presents today for pap smear and breast exam.      Mammo:NA  Pap smear:2023  Contraception:Vasectomy   G:3  P:3  Ab:0  Bone density:NA  Colonoscopy:NA

## 2025-04-22 LAB
HPV HR 12 DNA SPEC QL NAA+PROBE: NOT DETECTED
HPV16 DNA SPEC QL NAA+PROBE: NOT DETECTED
HPV16+18+H RISK 12 DNA SPEC-IMP: NORMAL
HPV18 DNA SPEC QL NAA+PROBE: NOT DETECTED

## 2025-04-24 ENCOUNTER — RESULTS FOLLOW-UP (OUTPATIENT)
Dept: OBGYN CLINIC | Age: 40
End: 2025-04-24

## 2025-05-01 RX ORDER — CITRULL/ARGIN/PINE XT/ROSE HIP 400-400 MG
2 TABLET ORAL DAILY
Qty: 60 TABLET | Refills: 3 | Status: SHIPPED | OUTPATIENT
Start: 2025-05-01

## 2025-07-28 RX ORDER — ERGOCALCIFEROL 1.25 MG/1
50000 CAPSULE, LIQUID FILLED ORAL WEEKLY
Qty: 12 CAPSULE | Refills: 1 | Status: SHIPPED | OUTPATIENT
Start: 2025-07-28

## 2025-07-28 NOTE — TELEPHONE ENCOUNTER
Justina Morgan called to request a refill on her medication.      Last office visit : 3/24/2025   Next office visit :3/25/26    Requested Prescriptions     Pending Prescriptions Disp Refills    vitamin D (ERGOCALCIFEROL) 1.25 MG (74049 UT) CAPS capsule [Pharmacy Med Name: VITAMIN D2 1.25MG(50,000 UNIT)] 12 capsule 1     Sig: TAKE 1 CAPSULE BY MOUTH ONE TIME PER WEEK            Dinorah Wong MA

## (undated) DEVICE — SUTURE STRATAFIX SYMMETRIC PDS + SZ 1 L18IN ABSRB VLT OS-6 SXPP1A201

## (undated) DEVICE — CATHETERIZATION TRAY PEDIATRIC 16 FR 5 CC INDWL DOVER

## (undated) DEVICE — DRESSING FOAM W10XL20CM ANTIMIC SELF ADH SAFETAC TECHNOLOGY

## (undated) DEVICE — Device

## (undated) DEVICE — MATTRESS TRANSFER X LG 39 IN LAT MAXI AIR

## (undated) DEVICE — PEN: MARKING STD 100/CS: Brand: MEDICAL ACTION INDUSTRIES

## (undated) DEVICE — STERILE POLYISOPRENE POWDER-FREE SURGICAL GLOVES WITH EMOLLIENT COATING: Brand: PROTEXIS

## (undated) DEVICE — SPONGE LAP W18XL18IN WHT COT 4 PLY FLD STRUNG RADPQ DISP ST

## (undated) DEVICE — SYRINGE EAR 2OZ ULC ST BLB FLAT BTM PVC BROAD TIP REUSE

## (undated) DEVICE — BINDER ABD H12IN COT FOR 45-62IN WAIST UNIV PREM 4 PNL DSGN

## (undated) DEVICE — TRAY EPI 25GA L3.5IN 0.75% BIPIVCAIN 8.25% D CONTAIN BPA

## (undated) DEVICE — GARMENT COMPR STD FOR 17IN CALF UNIF THER FLOTRN

## (undated) DEVICE — STAPLER SKIN SQ 30 ABSRB STPL DISP INSORB

## (undated) DEVICE — SUTURE VCRL SZ 0 L27IN ABSRB VLT L48MM CTX 1/2 CIR TAPR PNT J364H

## (undated) DEVICE — SYSTEM SKIN CLSR 22CM DERMBND PRINEO

## (undated) DEVICE — MASK ROUND 60MM FPH (10) S/USE: Brand: FISHER & PAYKEL HEALTHCARE

## (undated) DEVICE — BLADE SURG NO10 C STL DISP ST

## (undated) DEVICE — SUTURE VCRL + SZ 1-0 L36IN ABSRB UD CTX 1/2 CIR TAPR PNT VCP977H

## (undated) DEVICE — SOLUTION IV IRRIG POUR BRL 0.9% SODIUM CHL 2F7124